# Patient Record
Sex: MALE | Race: BLACK OR AFRICAN AMERICAN | Employment: UNEMPLOYED | URBAN - METROPOLITAN AREA
[De-identification: names, ages, dates, MRNs, and addresses within clinical notes are randomized per-mention and may not be internally consistent; named-entity substitution may affect disease eponyms.]

---

## 2019-06-14 ENCOUNTER — ED HISTORICAL/CONVERTED ENCOUNTER (OUTPATIENT)
Dept: OTHER | Age: 59
End: 2019-06-14

## 2020-02-13 ENCOUNTER — ED HISTORICAL/CONVERTED ENCOUNTER (OUTPATIENT)
Dept: OTHER | Age: 60
End: 2020-02-13

## 2020-10-14 ENCOUNTER — HOSPITAL ENCOUNTER (OUTPATIENT)
Dept: PHYSICAL THERAPY | Age: 60
Discharge: HOME | End: 2020-10-14
Payer: COMMERCIAL

## 2020-10-14 PROCEDURE — 97163 PT EVAL HIGH COMPLEX 45 MIN: CPT

## 2020-10-14 NOTE — PROGRESS NOTES
[address] Suite [facility], [facility]  Ph: [phone]    Fax: [phone]    Initial Evaluation/Plan of Care/Statement of Necessity for Physical Therapy Services     Patient name: Hardeep Elizalde   : 1960  [x]  Patient  Verified Provider#: [id number]    Start of Care: 10/14/2020     Referral source: Francisco Arreguin MD Return visit to MD: 10/21/2020     Medical/Treatment Diagnosis: Low back pain [M54.5]  Neck pain [M54.2]    Payor: [address] [place] [address] Road / Plan: [address] / Product Type: Managed Care Medicaid /       Prior Hospitalization: see medical history     Comorbidities: Arthritis   Prior Level of Function: Independent but has a cane that uses occasionally and has a walker. Medications: Verified on Patient Summary List          Patient / Family readiness to learn indicated by: asking questions, trying to perform skills and interest  Persons(s) to be included in education: patient (P)  Barriers to Learning/Limitations: None  Patient Self Reported Health Status: fair  Rehabilitation Potential: fair  Previous Treatment/Compliance: None  PMHx/Surgical Hx: Recent left hand surgery for middle tigger finger. Work Hx: Not working at this time. Living Situation: Patient lives with family. Barriers to progress: N/A  Motivation: Good  Substance use: N/A  Cognition: A & O x 3  Onset Date: ~ 2 years ago   In time:1:55pm   Out time: 2:55pm Total Treatment Time (min): 60min   Visit #: 1. Visit count could not be calculated. Make sure you are using a visit which is associated with an episode. [address]  Patient reports constant neck and back pain that started about ~ 2 years ago, back is worse then neck but it is still high. 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  Patient stated he went to MD since now the pain doesn't go away at all. MD took a series of xray's. X-ray imaging report showed cervical disc disorder,lumbar DDD and lumbar faced joint arthopathy. Patient also reports raticular pain in the left arm and left leg that goes below his knee sometimes toward left foot. Pain type is throbbing with stiffness in the mornings and some pins and needles. Patient also c/o loss of balance and near falls, most recent was 3 months ago without significant injury he already had the back/neck pain. Activities that makes pain worse:\" everything I do\". laying down, sitting/standing and walking. Activities that makes pain better: none, medication used to help but not anymore. Functional limitation cutting the grass, he can't stand at the sink and do dishes. He feels limitation with everything. Location of Pain: Neck center of neck spine and in the back center of back and spread to left hip and sometimes feet. PAIN:  Area of pain: 8/10   Pain Level (0-10 scale)  At rest: 8/10 With activity: 9/10   Worst: 10/10   Least: 6/10  Things that worsen pain:  \" everything I do\". laying down, sitting/standing and walking.    Things that ease pain: None  Pain Level (0-10 scale) pre treatment: 8/10 Pain Level (0-10 scale) post treatment: 8/10  OBJECTIVE    Physical Findings   Ortho:   Posture:  Rounded shoulders, forward head, right scapula wigging   Palpation: Left UT C3-C7 paraspinals   Gross findings:  R handed, right shoulder depress  Spine: *normal values in ()  CERVICAL                                                ROM                                  Strength   Flexion  20 pain at the base of neck   3+/5   Extension 60 pain at the base of neck  3+/5   Protraction WFL    Retraction WFL center  pain        R L MMT    Lateral Flexion (45) 20  15  3+/5    Rotation (90) 25 40  3+/5     Additional comments: pain with each movement weak MMT on neck     Specific joints:   SHOULDER                                         AROM   PROM            MMT   R L R L R L Flexion (180) 90 90 160 150 3-/5 3-/5   Extension (60)         Abduction (180) 65 65 110 110 3-/5 3-/5   Adduction (0)         IR (70)         ER (90)         Horizontal Abduction (130)         Horizontal Adduction (45)         Additional comments: below 3/5 MMT throughout , pain with shrugging   Assessment overall limited patient not fully trying to perform motions stated he can't do it.  Strength: Fair (weak R hand due to trigger finger surgery)   Neurological:   Myotomes -Weakness   Dermatomes -  Reflexes/Sensations-intact to light touch   Special Tests:     Special Tests: Cervical Distraction: (+)  Cervical Compression: (+)    Spurling Test: (-)        Physical Findings   Ortho:   Posture:  mild lordisis, flat feet, narrow ZEYAD,   Gait and Functional Mobility:  Slow gait, narrow ZEYAD, ambulates without AD.    Palpation: TTP L>R PSIS, L3-L5 parasinals with PA , radiating with L4/L5 , L piriformis     Swelling: none  Spine: *normal values in ()  TRUNK ROM:     Flexion:                         [] N/A  []WNL  []Minimal  [x]Moderate  [] Major pain in left side 60cm from floor   Extension:                     [] N/A  []WNL  []Minimal  []Moderate  [x] Major worse pain    Right Side Glide:           [] N/A  []WNL  []Minimal  [x]Moderate  [] Major 50 cm from floor   Right Lateral Flexion:    [] N/A  []WNL  []Minimal  []Moderate  [] Major   Left Slide Glide:   [] N/A  []WNL  []Minimal  []Moderate  [] Major   Left Lateral Flexion:   [] N/A  []WNL  []Minimal  []Moderate  [x] Major 75 cm from floor   Rotation to the Right:  [] N/A  []WNL  []Minimal  [x]Moderate  [] Major   Rotation to the Left:   [] N/A  []WNL  []Minimal  []Moderate  [x] Major pain   Additional comments: pain on left back/glut area with majority of lumbar ROM    Specific joints:     Hip      AROM       PROM             MMT   R L R L R L   Flexion (120) 70 70 80 75  3+ 3+   Extension (15)     2+ 2+   Abduction (40)     3+  3+    Adduction (30) -2 -2   IR (40)         ER (40)         Additional comments: Patient guarding and slight self limiting with mobility unable to formally assess. PROM of B hip stiffness and decreased range in flexion,abd and IR/ER   HS flexibility done with SLR bilateral can't go bassed 45deg due to pain . Unable to do a 90/90 test.       Knee          AROM          PROM                       MMT   R L R L R L   Extension (0)      4- 3+   Flexion (145)  Prone      3-  3-    Additional comments:quad flexibility  (L) 110deg / R 120 deg   Self limiting with testing     Ankle                                 AROM                       PROM                             MMT   R L R L R L   Dorsiflexion (15)     3+ 3+   Plantarflexion (50)     3- 3-   Additional comments:    Mobility Assessment:        Neurological: Reflexes / Sensations: light touch intact to   Special Tests:      Trendelenberg/storke test : unable to assess due limitation with SLS   FABERS: (+) on L lower back (decreaed hip IR on R>L LE )   Slump: (+) on left LE    H.S. SLR: (-) no radicular pain but back pain and HS tightness. Piriformis Ext: (-)    Lumbar Distraction: (+) back pain    Functional assessment  Single leg stance: (L) LE 2 sec (R) 3 sec  Patient unsteady   Heel walk: can't perform (narrow base) unsteady needed cg to prevent LOB. Toe walk: 2 steps and could do more reports weakness  Squat:  performed a mini squat reports knee pain and back pain   Timed Up and GO (TUG) Test = 32 sec    Description: Measure of function with correlates to balance and fall risk      Interpretation:  ? 10 seconds = normal    ? 20seconds = good mobility,can go out alone, mobile without gait aid    ? 30 seconds = problems, cannot go outside alone, requires gait aid     A score of ? 14 seconds has been shown to indicate high risk of falls.        Age  Matched  Norms: Age in years Mean  in  seconds    60-69       7.9 +/-0.9    70-79 7.7 +/-2.3    80-89   No  device:  11.0 +/-2.2    With  device:  19.9 +/-6.4     No  device:  14.7 +/- 7.9    With  Device: 19.9 +/-2.5         1. Cody Tinsley. The Time Up &Go: A Test of Basic Functional Mobility for Frail Elderly Persons. Journal of [address] [facility]  2790;11(6): [id number].    [address] [facility] [address],[place]. Predicting the Probability for Falls in Community Dwelling Older Adults Using the Timed Up & Go Test.  Physical Therapy 2000; 80(9): [id number].    3. Khari MM,Joyce GL, Natalia James. Functional Performance in Community Living Older Adults. Journal of Geriatric Physical Therapy 2003;  26(3): 14--?22.  4. [address], Falls Screening and Referral Algorithm, TUG, [place],June 2005    Tendem = 6 sec with L LE only unable to do R LE   Modality rationale: to improve the patients ability to   ** Decline heat. Min Type Additional Details    [] Estim: []UnAtt   []Att       []TENS instruct                  []IFC  []Premod   []NMES                     []Other:  []w/US   []w/ice   []w/heat  Position:  Location:    []  Ice     []  Heat  []  Ice massage Position:  Location:    []  Traction: [] Cervical       []Lumbar                       [] Prone          []Supine                       []Intermittent   []Continuous Lbs:  []w/heat  []W/heat and Estim    []  Ultrasound: []Continuous   [] Pulsed at:                           []1MHz   []3MHz Location:  W/cm2:      [] Skin assessment post-treatment:   []intact []redness- no adverse reaction   []redness - adverse reaction:         ASSESSMENT/Changes in Function:   Patient was referred to skilled  physical therapy with c/o neck and back pain that started about 2 years ago, patient presents with decreased active and passive range of motion in back, neck and shoulder, weak core stabilization, weak LEs, and weak neck and bilateral UEs.  Imaging were done which report showed cervical disc disorder,lumbar DDD and lumbar faced joint arthopathy. Patient presented with decreased flexibility in B UT, bilateral HS/quad musculatures,decreased balance he was not able to perform a SLS and gait impairments which showed in his TUG scores. Patient will benefit from skilled PT services to improve flexibility of neck/shoulder and back area, improve neck /back ROM, improve strength of deep muscle of the neck, core stabilization and LE's; with the goal to overall improve his mobility and ADL's. Plan of care will include patient education, HEP, there-ex, strengthening and flexibility. Problem List/Impairments: pain affecting function, decrease ROM, decrease strength, impaired gait/ balance, decrease ADL/ functional abilitiies, decrease activity tolerance, decrease flexibility/ joint mobility and decrease transfer abilities  Patient Goal (s): To reduce neck/back pain so I can do things    Short Term Goals: To be accomplished in 2-3  weeks:  1. Patient will be independent with HEP to augment POC  2. Patient will report decrease pain by 1-2 points since initiation of therapy to improve his mobility. 3. Patient will improve neck/back/shoulder ROM by 5-10 deg to improve movement. 4. Patient will improve HS/Quad flexibility range to assist with  Improving his ambulation    Long Term Goals: To be accomplished in 6-8 weeks:  1. Patient will be able to sit/stand from >10min without pain to improve his quality of life. 2. Patient will be able to lay down without pain to improve his sleeping  3. Patient will  improve his SLS by 1-2 points and his TUG score by 5-10 sec to decrease fall risk. Frequency / Duration: Patient to be seen 2 times per week for 6-8 weeks.     Certification Period: 10/14/2020-12/30/2020    Treatment Plan may include any combination of the following: Therapeutic exercise, Therapeutic activities, Neuromuscular re-education, Gait/balance training, Manual therapy, Patient education and Functional mobility training    Patient/ Caregiver education and instruction: exercises    [x]  Plan of care has been reviewed with PTA. The Plan of Care is based on information from the initial evaluation. Mildred Cole, PT 10/14/2020     ________________________________________________________________________    I certify that the above Therapy Services are being furnished while the patient is under my care. I agree with the treatment plan and certify that this therapy is necessary.     [de-identified] Signature:____________________  Date:____________Time: _________

## 2020-12-01 ENCOUNTER — HOSPITAL ENCOUNTER (OUTPATIENT)
Dept: PHYSICAL THERAPY | Age: 60
Discharge: HOME | End: 2020-12-01
Payer: COMMERCIAL

## 2020-12-01 PROCEDURE — 97110 THERAPEUTIC EXERCISES: CPT

## 2020-12-01 NOTE — PROGRESS NOTES
PT DAILY TREATMENT NOTE - [facility]     Patient Name: Leonel Guillen  Date:2020  : 1960  [x]  Patient  Verified  [facility] [address] / Plan: [address] / Product Type: Managed Care Medicaid /    Treatment Area: Low back pain [M54.5]  Neck pain [M54.2]   Next MD APPT:   In time:0104pm  Out time:0150pm  Total Treatment Time (min): 45  Total Timed Codes (min): 45  1:1 Treatment Time ([facility] only): 45   Visit #:  Visit count could not be calculated. Make sure you are using a visit which is associated with an episode. SUBJECTIVE  Pt reports his car broke down and he was not able to get to PT, as well as waiting for ins authorization. Pt is returning after 6 weeks. Pt reports he is still having back and neck pain , x-rays reveal the discs are wearing out. Pt reports it has put a stop to what he was use to. Pt is having difficulty with balance due to pain running down the LLE, Pt also has N/T. Neck/back pain increases with cold weather, rain, walking, any type of lifting. Pt is not working now, pain has been present for 2 years. Better with sleep, heat, medication, and sleeping on a firm surface  Pain ranges from 7 - 10/10 in neck and lumbar area 8-10/10. Since pt was first evaluated the neck pain is the same and back pain is worse.        Pain Level (0-10 scale) pre treatment: 7/10 neck, back 9/10 Pain Level (0-10 scale) post treatment: 6/10 neck, 9/10 lumbar  Any medication changes, allergies to medications, adverse drug reactions, diagnosis change, or new procedure performed?: [x] No    [] Yes (see summary sheet for update)  Subjective functional status/changes:   [x] No changes reported     OBJECTIVE          With   [] TE   [] TA   [] neuro   [] other: Patient Education: [] Review HEP    [] Progressed/Changed HEP based on:   [] positioning   [] body mechanics   [] transfers   [] heat/ice application    [] other:      Other Objective/Functional Measures: Physical Findings   Ortho:   Posture:  slumped  Palpation: TTP B cervical   Gait/Functional Mobility:  Slow gay  Gross findings:  Pt is obese   Spine: *normal values in ()  CERVICAL                                                ROM                                  Strength   Flexion  WFL    Extension 5    Protraction     Retraction        R L R L   Lateral Flexion (45) 15 15     Rotation (90) 35 45     Additional comments: PROM in supine, cervical rotation at least 70 degrees B. Specific joints:   SHOULDER                                         AROM   PROM            MMT   R L R L R L   Flexion (180) 105 110 130 145 5 5   Extension (60)         Abduction (180)     5 5   Adduction (0)         IR (70)         ER (90)   80 85 4- 4   Horizontal Abduction (130)         Horizontal Adduction (45)         Additional comments: Pain in B shoulders prior to end ranges. Special Tests: Cervical distraction and compression increase neck pain, B shoulder impingement signs (+)    TRUNK ROM:     Flexion:                         [] N/A  []WNL  []Minimal  [x]Moderate  [] Major   Extension:                     [] N/A  []WNL  []Minimal  [x]Moderate  [] Major    Right Lateral Flexion:    [] N/A  []WNL  []Minimal  []Moderate  [x] Major    Left Lateral Flexion:  [] N/A  []WNL  []Minimal  []Moderate  [x] Major   Rotation to the Right:  [] N/A  []WNL  []Minimal  []Moderate  [x] Major   Rotation to the Left:   [] N/A  []WNL  []Minimal  []Moderate  [x] Major   Additional comments: FF 17\" finger tip to floor, repeated ext, pt deviates to the L increases, lateral flexion 24\" from floor ( measured at 3rd finger), Pt heavily resists passive rotation. C/O pain at the waist with lateral flexion and down to the lateral aspect of the L superior thigh with ext.    Specific joints: *normal values in ()          MUSCLE STRENGTH  KEY       R  L  0 - No Contraction  Hip    flex  5  5  1 - Trace            ext  3+          5  2 - Poor abd  4  4  3 - Fair             add     4 - Good   Knee flex  5  5  5 - Normal            ext  4  4      Ankle DF  5  5                PF  3  3  Hip flexion limited actively especially on the L , passive [facility] but painful in the lumbar spine on the L. Unable to walk on toes with full PF, able to walk on heels  Mobility Assessment:    Bed mobility, pt is independent with little difficulty ,  Needed B UE support to go from sit to stand very slowly. Neurological: Reflexes / Sensations: Intact to LT   Special Tests: SLR LBP upon initiation , tight HS B      45 min Therapeutic Exercise:  [x] See flow sheet :   Rationale: increase ROM, increase strength and improve balance to improve the patients ability to perform daily activities with improved ROM and decreased pain. ASSESSMENT/Changes in Function:   Pt was referred to PT back in October for chronic neck and back pain and was evaluated on 10/14/2020 and was unable to return to therapy due to car trouble and having no transportation. The pt' S/S are the same. Pt was able to generate more force in the LEs at re -eval but ROM and pain levels have not changed. Pt describes a sedentary life style and shoulder benefit from skilled PT to improve ROM/strength and decrease pain   Patient will continue to benefit from skilled PT services to modify and progress therapeutic interventions, address functional mobility deficits, address ROM deficits, address strength deficits, analyze and address soft tissue restrictions and assess and modify postural abnormalities to attain remaining goals. [x]  See Plan of Care  []  See progress note/recertification  []  See Discharge Summary       GOALS/Progress towards goals:     Short Term Goals: To be accomplished in 2-3  weeks:  1. Patient will be independent with HEP to augment POC [] Met  [] Not Met [] Partially Met  2. Patient will report decrease pain by 1-2 points since initiation of therapy to improve his mobility. [] Met  [] Not Met [] Partially Met  3. Patient will improve neck/back/shoulder ROM by 5-10 deg to improve movement. [] Met  [] Not Met [] Partially Met  4. Patient will improve HS/Quad flexibility range to assist with  Improving his ambulation [] Met  [] Not Met [] Partially Met     Long Term Goals: To be accomplished in 6-8 weeks:  1. Patient will be able to sit/stand from >10min without pain to improve his quality of life. [] Met  [] Not Met [] Partially Met  2. Patient will be able to lay down without pain to improve his sleeping [] Met  [] Not Met [] Partially Met  3. Patient will  improve his SLS by 1-2 points and his TUG score by 5-10 sec to decrease fall risk.  [] Met  [] Not Met [] Partially Met     PLAN  [x]  Upgrade activities as tolerated     []  Continue plan of care  [x]  Update interventions per flow sheet       []  Discharge due to:_  []  Other:_      Prema Clincamilla 12/1/2020

## 2020-12-07 ENCOUNTER — HOSPITAL ENCOUNTER (OUTPATIENT)
Dept: PHYSICAL THERAPY | Age: 60
Discharge: HOME | End: 2020-12-07
Payer: COMMERCIAL

## 2020-12-07 PROCEDURE — 97110 THERAPEUTIC EXERCISES: CPT

## 2020-12-07 NOTE — PROGRESS NOTES
PT DAILY TREATMENT NOTE - [facility]     Patient Name: Oscar Celis  Date:2020  : 1960  [x]  Patient  Verified  Payor: [address] [place] [address] Road / Plan: [address] / Product Type: Managed Care Medicaid /    Treatment Area: Low back pain [M54.5]  Neck pain [M54.2]   Next MD APPT:   In time:0150 pm  Out time:02:23 pm  Total Treatment Time (min): 33  Total Timed Codes (min): 33  1:1 Treatment Time ( [address] only): 33  Visit #: 3/16 Visit count could not be calculated. Make sure you are using a visit which is associated with an episode. SUBJECTIVE    Pain Level (0-10 scale) pre treatment: 6.5 /10 neck, back 8- 9/10   Any medication changes, allergies to medications, adverse drug reactions, diagnosis change, or new procedure performed?: [x] No    [] Yes (see summary sheet for update)  Subjective functional status/changes:   [x] No changes reported  Patient stated he is in a lot of pain due to the weather, his back in 8-9/10 and neck 6.5/10. Patient requested to leave a few min earlier due to ride issues. OBJECTIVE          With   [] TE   [] TA   [] neuro   [] other: Patient Education: [] Review HEP    [] Progressed/Changed HEP based on:   [] positioning   [] body mechanics   [] transfers   [] heat/ice application    [] other:         45 min Therapeutic Exercise:  [x] See flow sheet :   Rationale: increase ROM, increase strength and improve balance to improve the patients ability to perform daily activities with improved ROM and decreased pain. - Added PPT, TA, marching, hip abd with TB, hip add with ball, SLR, slant board, bike, hamstring stretches, anterior trunk lean, chin tuck and neck AROM    Other Objective/Functional Measures:   - reports mild discomfort with left hip flexor with bike. - Presents with increased LE weakness and tightness   - Decline heat.     ASSESSMENT/Changes in Function:   Patient tolerated session fairly well, patient required frequent rest brakes, and he reports increased fatigue. Patient presents with general LE weakness and decreased flexibility. Patient was educated about the importance to exercises and slowly progress. Pt describes a sedentary life style and he would benefit from skilled PT to improve ROM/strength and decrease pain. Patient will continue to benefit from skilled PT services to modify and progress therapeutic interventions, address functional mobility deficits, address ROM deficits, address strength deficits, analyze and address soft tissue restrictions and assess and modify postural abnormalities to attain remaining goals. [x]  See Plan of Care  []  See progress note/recertification  []  See Discharge Summary       GOALS/Progress towards goals:     Short Term Goals: To be accomplished in 2-3  weeks:  1. Patient will be independent with HEP to augment POC [] Met  [] Not Met [] Partially Met  2. Patient will report decrease pain by 1-2 points since initiation of therapy to improve his mobility. [] Met  [] Not Met [] Partially Met  3. Patient will improve neck/back/shoulder ROM by 5-10 deg to improve movement. [] Met  [] Not Met [] Partially Met  4. Patient will improve HS/Quad flexibility range to assist with  Improving his ambulation [] Met  [] Not Met [] Partially Met     Long Term Goals: To be accomplished in 6-8 weeks:  1. Patient will be able to sit/stand from >10min without pain to improve his quality of life. [] Met  [] Not Met [] Partially Met  2. Patient will be able to lay down without pain to improve his sleeping [] Met  [] Not Met [] Partially Met  3. Patient will  improve his SLS by 1-2 points and his TUG score by 5-10 sec to decrease fall risk.  [] Met  [] Not Met [] Partially Met     PLAN  [x]  Upgrade activities as tolerated     []  Continue plan of care  [x]  Update interventions per flow sheet       []  Discharge due to:_  []  Other:_      Sarthak Section, PT 12/7/2020

## 2020-12-14 ENCOUNTER — HOSPITAL ENCOUNTER (OUTPATIENT)
Dept: PHYSICAL THERAPY | Age: 60
Discharge: HOME | End: 2020-12-14
Payer: COMMERCIAL

## 2020-12-14 PROCEDURE — 97110 THERAPEUTIC EXERCISES: CPT

## 2020-12-14 NOTE — PROGRESS NOTES
PT DAILY TREATMENT NOTE - [facility]     Patient Name: Keke Bocanegra  Date:2020  : 1960  [x]  Patient  Verified  [facility] [address] / Plan: [address] / Product Type: Managed Care Medicaid /    Treatment Area: Low back pain [M54.5]  Neck pain [M54.2]   Next MD APPT:   In time:0150 pm  Out time:02:23 pm  Total Treatment Time (min): 30  Total Timed Codes (min): 30  1:1 Treatment Time ([facility] only): 30  Visit #: 3/16 Visit count could not be calculated. Make sure you are using a visit which is associated with an episode. SUBJECTIVE    Pain Level (0-10 scale) pre treatment: back 8- 9/10 Neck 4-5/10  Any medication changes, allergies to medications, adverse drug reactions, diagnosis change, or new procedure performed?: [x] No    [] Yes (see summary sheet for update)  Subjective functional status/changes:   [x] No changes reported  Patient stated he fell this morning at home, he lost his balance he was not using his cane since he lost it  at [employer] this morning. Patient reports he hurt his back and left hip. Stated he has an MD appointment right after PT to do a series of x-rays and he needs to leave early since he has a 2:30pm appointment. . Stated he will try to do as much as he can in therapy today. Patient also reports neck is about the same. OBJECTIVE          With   [] TE   [] TA   [] neuro   [] other: Patient Education: [] Review HEP    [] Progressed/Changed HEP based on:   [] positioning   [] body mechanics   [] transfers   [] heat/ice application    [] other:         45 min Therapeutic Exercise:  [x] See flow sheet :   Rationale: increase ROM, increase strength and improve balance to improve the patients ability to perform daily activities with improved ROM and decreased pain. Other Objective/Functional Measures:   Decreased ranged towards left side with neck rotation and SB  Added UT and levator cervical stretches.    - reports mild discomfort with bike and supine there-ex on left hip and lower back. ASSESSMENT/Changes in Function:   Patient stated he had a fall this morning and injured his left hip and lower back. Patient tolerated exercises fairly and they were modified accordingly based on pain level. Patient stated he came to therapy because his trying to avoid having any injections. Patient required frequent rest brakes but able to do all exercises. Reports some pain on left lower back/glut area. Patient was educated about the importance to exercises and stretches at home as tolerated and work on cervical ROM and UT/levator stretches. Also encouraged to use heat as needed to lower back and cervical unlese MD has different recommending post x-ray. Patient would benefit from skilled PT to improve ROM/strength and decrease pain. Patient will continue to benefit from skilled PT services to modify and progress therapeutic interventions, address functional mobility deficits, address ROM deficits, address strength deficits, analyze and address soft tissue restrictions and assess and modify postural abnormalities to attain remaining goals. [x]  See Plan of Care  []  See progress note/recertification  []  See Discharge Summary       GOALS/Progress towards goals:     Short Term Goals: To be accomplished in 2-3  weeks:  1. Patient will be independent with HEP to augment POC [] Met  [] Not Met [x] Partially Met  2. Patient will report decrease pain by 1-2 points since initiation of therapy to improve his mobility. [] Met  [] Not Met [] Partially Met  3. Patient will improve neck/back/shoulder ROM by 5-10 deg to improve movement. [] Met  [] Not Met [] Partially Met  4. Patient will improve HS/Quad flexibility range to assist with  Improving his ambulation [] Met  [] Not Met [] Partially Met     Long Term Goals: To be accomplished in 6-8 weeks:  1. Patient will be able to sit/stand from >10min without pain to improve his quality of life.  [] Met  [] Not Met [] Partially Met  2. Patient will be able to lay down without pain to improve his sleeping [] Met  [] Not Met [] Partially Met  3. Patient will  improve his SLS by 1-2 points and his TUG score by 5-10 sec to decrease fall risk.  [] Met  [] Not Met [] Partially Met     PLAN  [x]  Upgrade activities as tolerated     []  Continue plan of care  [x]  Update interventions per flow sheet       []  Discharge due to:_  []  Other:_      Hailey Lyons, PT 12/14/2020

## 2020-12-21 ENCOUNTER — APPOINTMENT (OUTPATIENT)
Dept: PHYSICAL THERAPY | Age: 60
End: 2020-12-21
Payer: COMMERCIAL

## 2020-12-28 ENCOUNTER — APPOINTMENT (OUTPATIENT)
Dept: PHYSICAL THERAPY | Age: 60
End: 2020-12-28
Payer: COMMERCIAL

## 2020-12-31 ENCOUNTER — HOSPITAL ENCOUNTER (OUTPATIENT)
Dept: PHYSICAL THERAPY | Age: 60
Discharge: HOME | End: 2020-12-31
Payer: COMMERCIAL

## 2020-12-31 PROCEDURE — 97110 THERAPEUTIC EXERCISES: CPT

## 2020-12-31 NOTE — PROGRESS NOTES
PT DAILY TREATMENT NOTE - [facility]     Patient Name: Ritchie Coronado  Date:2020  : 1960  [x]  Patient  Verified  Payor: [address] [place] [address] Road / Plan: [address] / Product Type: Managed Care Medicaid /    Treatment Area: Low back pain [M54.5]  Neck pain [M54.2]   Next MD APPT:   In time: 2:05 pm  Out time:02:54 pm  Total Treatment Time (min): 50  Total Timed Codes (min): 50  1:1 Treatment Time ([facility] only): 50  Visit #:  Visit count could not be calculated. Make sure you are using a visit which is associated with an episode. SUBJECTIVE    Pain Level (0-10 scale) pre treatment: back 7-8 /10 Neck 4/10  Any medication changes, allergies to medications, adverse drug reactions, diagnosis change, or new procedure performed?: [x] No    [] Yes (see summary sheet for update)  Subjective functional status/changes:   [x] No changes reported  Patient stated he is feeling so-so, still has back/neck pain. His neck is not as bad, he went to the doctor and they did imaging after his recent fall which everything was normal just the usual OA/DDD/DJD as per patient. Patient reports about 40-50% improvement since started of therapy. OBJECTIVE          With   [] TE   [] TA   [] neuro   [] other: Patient Education: [] Review HEP    [] Progressed/Changed HEP based on:   [] positioning   [] body mechanics   [] transfers   [] heat/ice application    [] other:         40 min Therapeutic Exercise:  [x] See flow sheet :   Rationale: increase ROM, increase strength and improve balance to improve the patients ability to perform daily activities with improved ROM and decreased pain. Other Objective/Functional Measures:  Reassessment performed   With neck rot/sb noted limited ranged on right>left side verbal cues to avoid shoulder compensation required.    With back reports more      Physical Findings   Ortho:   Posture:  slumped, rounded shoulders  Palpation: TTP B cervical   Gait/Functional Mobility: Slow gay  Gross findings:  Pt is obese   Spine: *normal values in ()  CERVICAL                                                ROM                                  Strength        Flexion  25     Extension 45     Protraction       Retraction            R L R L   Lateral Flexion (45) 30  22 limited        Rotation (90) 35 35! P       Additional comments: Patient guarded with activity, active range is less then passive. Specific joints:   SHOULDER                                         AROM                        PROM                       MMT    R L R L R L   Flexion (180) 150 145 160 160 5 5   Extension (60)               Abduction (180)  WFL with scaption  WFL with scaption      5 5   Adduction (0)               IR (70)          4  4   ER (90)     80 85 4 4   Horizontal Abduction (130)               Horizontal Adduction (45)               Additional comments: Pain in B shoulders prior to end ranges. Special Tests: Cervical distraction and compression increase neck pain, B shoulder impingement signs (+) Neer/HK     TRUNK ROM:      Flexion:                                  []? N/A  []? WNL  []? Minimal  [x]? Moderate  []? Major   Extension:                             []? N/A  []? WNL  []? Minimal  [x]? Moderate  []? Major    Right Lateral Flexion:           []? N/A  []? WNL  []? Minimal  []? Moderate  [x]? Major    Left Lateral Flexion:  []? N/A  []? WNL  []? Minimal  [x]? Moderate  []? Major P! Rotation to the Right:           []? N/A  []? WNL  []? Minimal  [x]? Moderate  []? Major   Rotation to the Left:              []? N/A  []? WNL  []? Minimal  [x]? Moderate  []? Major p! Additional comments: FF 28 \" finger tip to floor, repeated ext, pt deviates to the L increases, lateral flexion 24\" from floor ( measured at 3rd finger). Increased pain on R side of LS area.   Specific joints: *normal values in ()    flacuation MUSCLE STRENGTH  KEY                                                                             R                      L  0 - No Contraction                   Hip    flex                    - 4                    - 4  1 - Trace                                           ext                      3+                    5  2 - Poor                                            abd                     4                      4  3 - Fair                                              add                                   4 - Good                                  Knee flex                     5                      5  5 - Normal                                        ext                      4                      -4                                                  Ankle DF                    5                      5                                                            PF                     3                      3    Mobility Assessment:    Bed mobility, pt is independent with little difficulty                                         Pain post session 6/10 in back and 3/10 in neck     ASSESSMENT/Changes in Function:   Reassessment performed today patient reports overall 40-50% improvement since start of therapy, and stated his goal is to reduce back pain and loose some weight. Patient stated he is trying to move more and do his ADL's as tolerated. He continues to reports left side lumbar sacral pain and he has limited trunk ROM. We also noted that strength in his LE fluctuates and some time he preforms better then other. Patient is able to perform most exercises without difficulty but reports feeling general weakness and presents with decreased endurance and fatigue. Patient also guard with certain activities. In terms of neck/shoulder AROM/MMT, patient is progressing well and pain is subsiding noted improvement with mobility but he still has limited range in SB/ROt towards L>R side.  Patient was encouraged to continue to do his exercises and stretches at home. Patient was educated about the importance of his posture and sitting upright and doing his exercises and stretches at home as tolerated. Patient would benefit from skilled PT to improve ROM/strength and decrease pain. Patient will continue to benefit from skilled PT services to modify and progress therapeutic interventions, address functional mobility deficits, address ROM deficits, address strength deficits, analyze and address soft tissue restrictions and assess and modify postural abnormalities to attain remaining goals. [x]  See Plan of Care  []  See progress note/recertification  []  See Discharge Summary       GOALS/Progress towards goals:     Short Term Goals: To be accomplished in 2-3  weeks:  1. Patient will be independent with HEP to augment POC [x] Met  [] Not Met [] Partially Met  2. Patient will report decrease pain by 1-2 points since initiation of therapy to improve his mobility. [] Met  [x] Not Met [] Partially Met  3. Patient will improve neck/back/shoulder ROM by 5-10 deg to improve movement. [] Met  [] Not Met [x] Partially Met  4. Patient will improve HS/Quad flexibility range to assist with  Improving his ambulation [] Met  [] Not Met [x] Partially Met     Long Term Goals: To be accomplished in 6-8 weeks:  1. Patient will be able to sit/stand from >10min without pain to improve his quality of life. [] Met  [] Not Met [x] Partially Met  2. Patient will be able to lay down without pain to improve his sleeping [] Met  [] Not Met [x] Partially Met can only sleep on his side  3. Patient will  improve his SLS by 1-2 points and his TUG score by 5-10 sec to decrease fall risk.  [] Met  [] Not Met [] Partially Met     PLAN  [x]  Upgrade activities as tolerated     []  Continue plan of care  [x]  Update interventions per flow sheet       []  Discharge due to:_  []  Other:_      Camryn Candelario, PT 12/31/2020

## 2020-12-31 NOTE — PROGRESS NOTES
[address] [address] [address] [facility] [address] [facility]  Ph: [phone]    Fax: [phone]  Plan of Care  Name: Altaf Euceda   : 1960   MD: Aren Adams MD     Treatment Diagnosis: Low back pain [M54.5]  Neck pain [M54.2]  Start of Care: 10/14/2020 Visits from Start of Care: 5  Missed Visits: 2  Problem List/Impairments: pain affecting function, decrease ROM, decrease strength, impaired gait/ balance, decrease ADL/ functional abilitiies and decrease flexibility/ joint mobility       Summary of Care/Goals:  Patient has been reporting to op therapy for LBP and neck pain, today was his 5 session and we performed a re-assessment. Patient overall reports about 40-50% improvement in his symptoms since start of therapy, stated that he trys to do his ADL's as tolerated and wants to get stronger and lose weight. We noted some improvement in range of motion and strength, however patient fluctuates from session to session. He continues to reports left side lumbar sacral pain and he has limited trunk ROM. We also noted that strength in his LE fluctuates and some time he preforms better then other. Patient is able to perform most exercises without difficulty but reports feeling general weakness and presents with decreased endurance and fatigue. In terms of neck/shoulder AROM/MMT, patient is progressing well and pain is subsiding noted improvement with mobility but he still has limited range in neck SB/ROt towards L>R side. Patient was encouraged to continue to do his exercises and stretches at home. Our focus is also on his posture and being more aware of standing straighter. Patient is meeting some of his goals and will benefit from continuation of skilled PT services to modify and progress therapeutic interventions, address functional mobility deficits, address ROM deficits, address strength deficits, analyze and address soft tissue restrictions and assess and modify postural abnormalities to attain remaining goals. Other Objective/Functional Measures:  Reassessment performed   With neck rot/sb noted limited ranged on right>left side verbal cues to avoid shoulder compensation required. With back reports more       Physical Findings   Ortho:   Posture:  slumped, rounded shoulders  Palpation: TTP B cervical   Gait/Functional Mobility:  Slow gay  Gross findings:  Pt is obese   Spine: *normal values in ()  CERVICAL                                                ROM                                  Strength        Flexion  25     Extension 45     Protraction       Retraction            R L R L   Lateral Flexion (45) 30  22 limited        Rotation (90) 35 35! P       Additional comments: Patient guarded with activity, active range is less then passive.       Specific joints:   SHOULDER                                         AROM                        PROM                       MMT    R L R L R L   Flexion (180) 150 145 160 160 5 5   Extension (60)               Abduction (180)  WFL with scaption  WFL with scaption      5 5   Adduction (0)               IR (70)          4  4   ER (90)     80 85 4 4   Horizontal Abduction (130)               Horizontal Adduction (45)               Additional comments: Pain in B shoulders prior to end ranges.                                                                         Special Tests: Cervical distraction and compression increase neck pain, B shoulder impingement signs (+) Neer/HK      TRUNK ROM:      Flexion:                                  []? ? N/A  []? ?WNL  []? ? Minimal  [x]? ? Moderate  []? ? Major   Extension:                             []? ? N/A  []? ?WNL  []? ? Minimal  [x]? ? Moderate  []? ? Major    Right Lateral Flexion:           []? ? N/A  []? ?WNL  []? ? Minimal  []? ? Moderate  [x]? ? Major    Left Lateral Flexion:  []?? N/A  []? ?WNL  []? ? Minimal  [x]? ? Moderate  []? ? Major P! Rotation to the Right:           []? ? N/A  []? ?WNL  []??Minimal  [x]? ? Moderate  []? ? Major   Rotation to the Left:              []? ? N/A  []? ?WNL  []? ? Minimal  [x]? ? Moderate  []? ? Major p! Additional comments: FF 28 \" finger tip to floor, repeated ext, pt deviates to the L increases, lateral flexion 24\" from floor ( measured at 3rd finger). Increased pain on R side of LS area. Specific joints: *normal values in ()    flacuation                                                              MUSCLE STRENGTH  KEY                                                                             R                      L  0 - No Contraction                   Hip    flex                    - 4                    - 4  1 - Trace                                           ext                      3+                    5  2 - Poor                                            abd                     4                      4  3 - Fair                                              add                                   4 - Good                                  Knee flex                     5                      5  5 - Normal                                        ext                      4                      -4                                                  Ankle DF                    5                      5                                                            PF                     3                      3     Mobility Assessment:    Bed mobility, pt is independent with little difficulty                                            GOALS/Progress towards goals:     Short Term Goals: To be accomplished in 2-3  weeks:  1. Patient will be independent with HEP to augment POC [x]? Met  []? Not Met []? Partially Met  2. Patient will report decrease pain by 1-2 points since initiation of therapy to improve his mobility. []? Met  [x]? Not Met []? Partially Met  3. Patient will improve neck/back/shoulder ROM by 5-10 deg to improve movement. []? Met  []? Not Met [x]? Partially Met  4. Patient will improve HS/Quad flexibility range to assist with  Improving his ambulation []? Met  []? Not Met [x]? Partially Met     Long Term Goals: To be accomplished in 6-8 weeks:  1. Patient will be able to sit/stand from >10min without pain to improve his quality of life. []? Met  []? Not Met [x]? Partially Met  2. Patient will be able to lay down without pain to improve his sleeping []? Met  []? Not Met [x]? Partially Met can only sleep on his side  3. Patient will  improve his SLS by 1-2 points and his TUG score by 5-10 sec to decrease fall risk. []? Met  []? Not Met []? Partially Met       Recommendations: Continuation of therapy and extension of POC. Start of care 12/30/2020  Frequency / Duration: Patient to be seen 2 times per week for  12 treatments. Certification Period: 12/31/2020-3/30/2020  Treatment Plan may include any combination of the following: Therapeutic exercise, Therapeutic activities, Neuromuscular re-education, Physical agent/modality, Manual therapy and Patient education    Patient/ Caregiver education and instruction: exercises  [x]  Plan of care has been reviewed with PTA. Mildred Cole, PT 12/31/2020     Retain this original for your records. If you are unable to process this request in 24 hours, please contact our office.   ________________________________________________________________________  NOTE TO PHYSICIAN:  Please complete the following and fax to: [address] [facility]:  Fax: [phone]   ____ I certify that the above Therapy Services are being furnished while the patient is under my care. I agree with the treatment plan and certify that this therapy is necessary.    ____ I have read the above report and request that my patient continue therapy with the following changes/special instructions:  ____ I have read the above report and request that my patient be discharged from therapy    Physician's Signature:_________________ Date:___________Time:__________

## 2021-01-07 ENCOUNTER — HOSPITAL ENCOUNTER (OUTPATIENT)
Dept: PHYSICAL THERAPY | Age: 61
Discharge: HOME | End: 2021-01-07
Payer: COMMERCIAL

## 2021-01-07 PROCEDURE — 97140 MANUAL THERAPY 1/> REGIONS: CPT

## 2021-01-07 PROCEDURE — 97110 THERAPEUTIC EXERCISES: CPT

## 2021-01-07 NOTE — PROGRESS NOTES
PT DAILY TREATMENT NOTE - [facility]     Patient Name: Nancy Benoit  Date:2021  : 1960  [x]  Patient  Verified  Payor: Kain Ramírez / Plan: Darlene Polk / Product Type: Managed Care Medicaid /    Treatment Area: Low back pain [M54.5]  Neck pain [M54.2]   Next MD APPT: 2021  In time: 2:28 pm  Out time:03:15 pm  Total Treatment Time (min): 45  Total Timed Codes (min): 45  1:1 Treatment Time ( [address] only): 45  Visit #:  Visit count could not be calculated. Make sure you are using a visit which is associated with an episode. SUBJECTIVE    Pain Level (0-10 scale) pre treatment: back 8 /10   Neck 4/10  Any medication changes, allergies to medications, adverse drug reactions, diagnosis change, or new procedure performed?: [x] No    [] Yes (see summary sheet for update)  Subjective functional status/changes:   [x] No changes reported  Patient stated his back is bothering him today more then his neck, he has about 8/10 back pain on his left that goes to the left hip and down the leg. OBJECTIVE          With   [] TE   [] TA   [] neuro   [] other: Patient Education: [] Review HEP    [] Progressed/Changed HEP based on:   [] positioning   [] body mechanics   [] transfers   [] heat/ice application    [] other:         35 min Therapeutic Exercise:  [x] See flow sheet :   Rationale: increase ROM, increase strength and improve balance to improve the patients ability to perform daily activities with improved ROM and decreased pain. 10 min Manual Therapy: Left lower back    Rationale: decrease pain, increase tissue extensibility and decrease trigger points to improve the patients ability to reduce pain with mobility.      Other Objective/Functional Measures:  Progress with POC               Pain post session 7/10 in back and 3/10 in neck     ASSESSMENT/Changes in Function:   Patient reports higher pain level today specially on his back, stated his it been radiating to his hip and foot on the left side. Patient stated he is doing his exercises but has very little relief and has increased stiffness. Patient was able to perform exercises but reports still same pain level. We performed some manual mobilization to left lower back as tolerated due to tenderness to palpation. Patient reports slight relief. Patient was educated about the importance of his posture and sitting upright and doing his exercises and stretches at home as tolerated. Patient would benefit from skilled PT to improve ROM/strength and decrease pain. Patient will continue to benefit from skilled PT services to modify and progress therapeutic interventions, address functional mobility deficits, address ROM deficits, address strength deficits, analyze and address soft tissue restrictions and assess and modify postural abnormalities to attain remaining goals. [x]  See Plan of Care  []  See progress note/recertification  []  See Discharge Summary       GOALS/Progress towards goals:     Short Term Goals: To be accomplished in 2-3  weeks:  1. Patient will be independent with HEP to augment POC [x] Met  [] Not Met [] Partially Met  2. Patient will report decrease pain by 1-2 points since initiation of therapy to improve his mobility. [] Met  [x] Not Met [] Partially Met  3. Patient will improve neck/back/shoulder ROM by 5-10 deg to improve movement. [] Met  [] Not Met [x] Partially Met  4. Patient will improve HS/Quad flexibility range to assist with  Improving his ambulation [] Met  [] Not Met [x] Partially Met     Long Term Goals: To be accomplished in 6-8 weeks:  1. Patient will be able to sit/stand from >10min without pain to improve his quality of life. [] Met  [] Not Met [x] Partially Met  2. Patient will be able to lay down without pain to improve his sleeping [] Met  [] Not Met [x] Partially Met can only sleep on his side  3.  Patient will  improve his SLS by 1-2 points and his TUG score by 5-10 sec to decrease fall risk. [] Met  [] Not Met [] Partially Met     PLAN  [x]  Upgrade activities as tolerated     []  Continue plan of care  [x]  Update interventions per flow sheet       []  Discharge due to:_  []  Other:_      Priscilla Guerrero, PT 1/7/2021

## 2021-01-15 ENCOUNTER — APPOINTMENT (OUTPATIENT)
Dept: PHYSICAL THERAPY | Age: 61
End: 2021-01-15
Payer: COMMERCIAL

## 2021-01-18 ENCOUNTER — HOSPITAL ENCOUNTER (EMERGENCY)
Age: 61
Discharge: HOME | End: 2021-01-18
Payer: COMMERCIAL

## 2021-01-18 ENCOUNTER — APPOINTMENT (OUTPATIENT)
Dept: GENERAL RADIOLOGY | Age: 61
End: 2021-01-18
Attending: PHYSICIAN ASSISTANT
Payer: COMMERCIAL

## 2021-01-18 VITALS
OXYGEN SATURATION: 94 % | RESPIRATION RATE: 19 BRPM | TEMPERATURE: 98.2 F | HEART RATE: 82 BPM | DIASTOLIC BLOOD PRESSURE: 67 MMHG | HEIGHT: 70 IN | WEIGHT: 245 LBS | SYSTOLIC BLOOD PRESSURE: 124 MMHG | BODY MASS INDEX: 35.07 KG/M2

## 2021-01-18 PROCEDURE — 75810000275 HC EMERGENCY DEPT VISIT NO LEVEL OF CARE

## 2021-02-19 ENCOUNTER — APPOINTMENT (OUTPATIENT)
Dept: PHYSICAL THERAPY | Age: 61
End: 2021-02-19
Payer: COMMERCIAL

## 2021-02-23 ENCOUNTER — HOSPITAL ENCOUNTER (OUTPATIENT)
Dept: PHYSICAL THERAPY | Age: 61
Discharge: HOME | End: 2021-02-23
Payer: COMMERCIAL

## 2021-02-23 PROCEDURE — 97110 THERAPEUTIC EXERCISES: CPT

## 2021-02-23 NOTE — PROGRESS NOTES
PT DAILY TREATMENT NOTE - [facility]     Patient Name: Radha Bazan  Date:2021  : 1960  [x]  Patient  Verified  Payor: [address] [place] [address] Road / Plan: [address] / Product Type: Managed Care Medicaid /    Treatment Area: Low back pain [M54.5]  Neck pain [M54.2]   Next MD APPT: 2021  In time: 10:50 am  Out time:11:30 am  Total Treatment Time (min): 40  Total Timed Codes (min): 40  1:1 Treatment Time ([facility] only): 40  Visit #:  ( new ins auth ) Visit count could not be calculated. Make sure you are using a visit which is associated with an episode. SUBJECTIVE    Pain Level (0-10 scale) pre treatment: back 8 /10 both sides   Neck 5/10  Any medication changes, allergies to medications, adverse drug reactions, diagnosis change, or new procedure performed?: [x] No    [] Yes (see summary sheet for update)  Subjective functional status/changes:   [x] No changes reported  Patient stated his back is bothering him today more then his neck . He states his back especially the L side hurts all the time, with no relief. OBJECTIVE          With   [] TE   [] TA   [] neuro   [] other: Patient Education: [] Review HEP    [] Progressed/Changed HEP based on:   [] positioning   [] body mechanics   [] transfers   [] heat/ice application    [] other:         40 min Therapeutic Exercise:  [x] See flow sheet :   Rationale: increase ROM, increase strength and improve balance to improve the patients ability to perform daily activities with improved ROM and decreased pain. min Manual Therapy: Left lower back    Rationale: decrease pain, increase tissue extensibility and decrease trigger points to improve the patients ability to reduce pain with mobility.      Other Objective/Functional Measures:  Progress with POC added open book               Pain post session 8/10 in back on L side only and 0/10 in neck     ASSESSMENT/Changes in Function:   Patient does not complain of any increase of pain or discomfort with exercises and is able to perform all exercises. He reports no pain to neck following treatment session and only L side pain to his lumbar spine. He reports no significant change to pain on L side of lumbar spine. Patient would benefit from skilled PT to improve ROM/strength and decrease pain. Patient will continue to benefit from skilled PT services to modify and progress therapeutic interventions, address functional mobility deficits, address ROM deficits, address strength deficits, analyze and address soft tissue restrictions and assess and modify postural abnormalities to attain remaining goals. [x]  See Plan of Care  []  See progress note/recertification  []  See Discharge Summary       GOALS/Progress towards goals:     Short Term Goals: To be accomplished in 2-3  weeks:  1. Patient will be independent with HEP to augment POC [x] Met  [] Not Met [] Partially Met  2. Patient will report decrease pain by 1-2 points since initiation of therapy to improve his mobility. [] Met  [x] Not Met [] Partially Met  3. Patient will improve neck/back/shoulder ROM by 5-10 deg to improve movement. [] Met  [] Not Met [x] Partially Met  4. Patient will improve HS/Quad flexibility range to assist with  Improving his ambulation [] Met  [] Not Met [x] Partially Met     Long Term Goals: To be accomplished in 6-8 weeks:  1. Patient will be able to sit/stand from >10min without pain to improve his quality of life. [] Met  [] Not Met [x] Partially Met  2. Patient will be able to lay down without pain to improve his sleeping [] Met  [] Not Met [x] Partially Met can only sleep on his side  3. Patient will  improve his SLS by 1-2 points and his TUG score by 5-10 sec to decrease fall risk.  [] Met  [] Not Met [] Partially Met     PLAN  [x]  Upgrade activities as tolerated     []  Continue plan of care  [x]  Update interventions per flow sheet       []  Discharge due to:_  []  Other:_      Caleb Sanchez PTA 2/23/2021

## 2021-02-25 ENCOUNTER — HOSPITAL ENCOUNTER (OUTPATIENT)
Dept: PHYSICAL THERAPY | Age: 61
Discharge: HOME | End: 2021-02-25
Payer: COMMERCIAL

## 2021-02-25 PROCEDURE — 97110 THERAPEUTIC EXERCISES: CPT

## 2021-02-25 NOTE — PROGRESS NOTES
PT DAILY TREATMENT NOTE - [facility]     Patient Name: Sapna Todd  Date:2021  : 1960  [x]  Patient  Verified  Payor: [address] [place] [address] Road / Plan: [address] / Product Type: Managed Care Medicaid /    Treatment Area: Low back pain [M54.5]  Neck pain [M54.2]   Next MD APPT: 2021  In time: 10:20 am  Out time: 1105am  Total Treatment Time (min): 45  Total Timed Codes (min): 45  1:1 Treatment Time ( [address] only): 45  Visit #:  ( new ins auth ) Visit count could not be calculated. Make sure you are using a visit which is associated with an episode. SUBJECTIVE    Pain Level (0-10 scale) pre treatment: back down into L side 10   Neck 10  Any medication changes, allergies to medications, adverse drug reactions, diagnosis change, or new procedure performed?: [x] No    [] Yes (see summary sheet for update)  Subjective functional status/changes:   [x] No changes reported  \"I had to take pain medicine yesterday because of the weather, I took it later about 2-3 in the afternoon. I also used a hot pack, twice for 20 minutes on my low back. It felt like it loosened it up a little. My doctor called and prescribed a new medication for my nerve pain. I don't know the name, I have to go pick it up. I didn't like the medication he prescribed because it was laced with opium and I didn't want to take it. \"     OBJECTIVE          With   [x] TE   [] TA   [] neuro   [] other: Patient Education: [] Review HEP    [] Progressed/Changed HEP based on:   [] positioning   [] body mechanics   [] transfers   [] heat/ice application    [] other:         45 min Therapeutic Exercise:  [x] See flow sheet :   Rationale: increase ROM, increase strength and improve balance to improve the patients ability to perform daily activities with improved ROM and decreased pain.       min Manual Therapy: Left lower back    Rationale: decrease pain, increase tissue extensibility and decrease trigger points to improve the patients ability to reduce pain with mobility. Other Objective/Functional Measures:  Progress with POC, see flow sheet                 Pain post session 6/10 in back on L side only and 3/10 in neck     ASSESSMENT/Changes in Function:    Patient denies increase in low back/cervical pain during PRE. Progressed hamstring curl and clamshell resistance with theraband application, and increased repetitions with good tolerance. Pt requires minimal cues for proper form throughout therex program to avoid injury and overcompensation. Patient reports increased stretch on L side with LTR and open books. Patient would benefit from skilled PT to improve ROM/strength and decrease pain. Patient will continue to benefit from skilled PT services to modify and progress therapeutic interventions, address functional mobility deficits, address ROM deficits, address strength deficits, analyze and address soft tissue restrictions and assess and modify postural abnormalities to attain remaining goals. [x]  See Plan of Care  []  See progress note/recertification  []  See Discharge Summary       GOALS/Progress towards goals:     Short Term Goals: To be accomplished in 2-3  weeks:  1. Patient will be independent with HEP to augment POC [x] Met  [] Not Met [] Partially Met  2. Patient will report decrease pain by 1-2 points since initiation of therapy to improve his mobility. [] Met  [x] Not Met [] Partially Met  3. Patient will improve neck/back/shoulder ROM by 5-10 deg to improve movement. [] Met  [] Not Met [x] Partially Met  4. Patient will improve HS/Quad flexibility range to assist with  Improving his ambulation [] Met  [] Not Met [x] Partially Met     Long Term Goals: To be accomplished in 6-8 weeks:  1. Patient will be able to sit/stand from >10min without pain to improve his quality of life. [] Met  [] Not Met [x] Partially Met  2.  Patient will be able to lay down without pain to improve his sleeping [] Met  [] Not Met [x] Partially Met can only sleep on his side  3. Patient will  improve his SLS by 1-2 points and his TUG score by 5-10 sec to decrease fall risk.  [] Met  [] Not Met [] Partially Met     PLAN  [x]  Upgrade activities as tolerated     []  Continue plan of care  [x]  Update interventions per flow sheet       []  Discharge due to:_  []  Other:_      Alina Walter, PT, DPT 2/25/2021

## 2021-03-29 ENCOUNTER — OFFICE VISIT (OUTPATIENT)
Dept: INTERNAL MEDICINE CLINIC | Age: 61
End: 2021-03-29
Payer: COMMERCIAL

## 2021-03-29 VITALS
HEART RATE: 85 BPM | DIASTOLIC BLOOD PRESSURE: 72 MMHG | RESPIRATION RATE: 18 BRPM | WEIGHT: 250.6 LBS | OXYGEN SATURATION: 96 % | SYSTOLIC BLOOD PRESSURE: 122 MMHG | BODY MASS INDEX: 35.08 KG/M2 | HEIGHT: 71 IN | TEMPERATURE: 98.1 F

## 2021-03-29 DIAGNOSIS — G89.29 CHRONIC MIDLINE BACK PAIN, UNSPECIFIED BACK LOCATION: ICD-10-CM

## 2021-03-29 DIAGNOSIS — E66.3 OVERWEIGHT: ICD-10-CM

## 2021-03-29 DIAGNOSIS — M54.16 LUMBAR RADICULITIS: ICD-10-CM

## 2021-03-29 DIAGNOSIS — M54.9 CHRONIC MIDLINE BACK PAIN, UNSPECIFIED BACK LOCATION: ICD-10-CM

## 2021-03-29 DIAGNOSIS — Z12.5 PROSTATE CANCER SCREENING: ICD-10-CM

## 2021-03-29 DIAGNOSIS — E78.00 PURE HYPERCHOLESTEROLEMIA: Primary | ICD-10-CM

## 2021-03-29 PROCEDURE — 99214 OFFICE O/P EST MOD 30 MIN: CPT | Performed by: INTERNAL MEDICINE

## 2021-03-29 RX ORDER — BACLOFEN 10 MG/1
10 TABLET ORAL
COMMUNITY
Start: 2021-01-12 | End: 2022-10-12 | Stop reason: DRUGHIGH

## 2021-03-29 NOTE — PROGRESS NOTES
Michelle Contreras is a 62 y.o. male and presents with Establish Care, Back Pain, and Neck Pain  Patient presents wishing to establish very pleasant 31-year-old slightly overweight followed by Dr. Michael Quinn in January medical but wishes to relocate here currently has been recently on cephalexin 500 mg-meloxicam 7.5 mg nightly tramadol 50 mg every 8 hours baclofen 10 mg twice a day by Dr. Irais Gavin his orthopedist rehab specialist for chronic back pain has lumbar radiculitis with pain down the left leg at times he says it is troubling even with the medications prescribed he is to follow-up with Dr. Irais Gavin- in the next month however he says he seems like he cannot wait until then for further evaluation and management he is being considered for MRI of the lumbar spine by Dr. Zhao Smith but since the patient is here and not doing as well I will proceed with he was agreeable for the office phlebotomist to draw blood flow studies below he is also on atorvastatin for hypercholesterolemia 20 mg a day he has had 2 inguinal hernia operations an operation on his hand he has degenerative arthritis he smokes cigarettes we discussed abstinence and discontinuing smoking he already had screening colonoscopy is not due for one at this time blood pressure resting 120/70 right arm is very pleasant and engaging we discussed coronavirus pandemic evaluation treatment and vaccine           Review of Systems  Constitutional: negative for fevers, chills, anorexia, weight loss and fatigue,no insomnia  Eyes:   negative for visual disturbance and irritation, eye discharge, eye pain. no eye redness. ENT:   negative for tinnitus, sore throat, nasal congestion, ear pain, hoarseness, hearing loss.,no snoring.   Respiratory:  negative for cough, hemoptysis, shortness of breath, wheezing,  CV:   negative for chest pain, palpitations, lower extremity edema, shortness of breath while sitting, walking or at night  GI:   negative for nausea, vomiting, diarrhea, abdominal pain,melena,constipation. Endo:               negative for polyuria, polydipsia, polyphagia, cold or heat intolerance,hair loss. Genitourinary: negative for frequency, dysuria and hematuria,urethral discharge,nocturia.straining while urination,urinary incontinence. Integument:  negative for rash and pruritus  Hematologic:  negative for easy bruising and gum/nose bleeding, enlarged nodes  Musculoskel: negative for myalgias, arthralgias, noted back pain, muscle weakness, joint pain, h/o fall,cramps,calf pain. DJD  Neurological:  negative for headaches, dizziness, vertigo, memory problems, gait and seizures loss of consciousness,no ataxia. Lumbar radiculitis is notable  Behavl/Psych: negative for feelings of anxiety, depression, mood changes ,sadness    Past Medical History:   Diagnosis Date    Gout      Past Surgical History:   Procedure Laterality Date    HX HERNIA REPAIR      twice    HX ORTHOPAEDIC      hand     Social History     Socioeconomic History    Marital status:      Spouse name: Not on file    Number of children: Not on file    Years of education: Not on file    Highest education level: Not on file   Tobacco Use    Smoking status: Current Some Day Smoker     Packs/day: 0.25     Years: 30.00     Pack years: 7.50     Types: Cigarettes    Smokeless tobacco: Never Used    Tobacco comment: pt states 3 times a week   Substance and Sexual Activity    Alcohol use: Yes     Comment: occasionally    Drug use: Never     Family History   Problem Relation Age of Onset    Cancer Mother     Diabetes Sister     Heart Disease Brother      Current Outpatient Medications   Medication Sig Dispense Refill    baclofen (LIORESAL) 10 mg tablet Take 10 mg by mouth two (2) times daily as needed.        No Known Allergies    Objective:  Visit Vitals  /72 (BP 1 Location: Right arm, BP Patient Position: Sitting, BP Cuff Size: Large adult)   Pulse 85   Temp 98.1 °F (36.7 °C) (Oral)   Resp 18   Ht 5' 11\" (1.803 m)   Wt 250 lb 9.6 oz (113.7 kg)   SpO2 96%   BMI 34.95 kg/m²       Physical Exam:   Constitutional: General Appearance: healthy-appearing and obese. Level of Distress: NAD. Ambulation: ambulating normally. Psychiatric: Mental Status: normal mood and affect and active and alert. Orientation: to time, place, and person. no agitation. ,normal eye contact. normal insight  Head: Head: normocephalic and atraumatic. Eyes: Pupils: PERRLA. Sclerae: non-icteric. ENMT: No lesions on external ear, no hearing loss. No lesions on external nose, sinus tenderness, or nasal discharge. Lips, Teeth, and no mouth or lip ulcers   Neck: Neck: supple, trachea midline, and no masses. Lymph Nodes: no cervical LAD. Thyroid: no enlargement or nodules and non-tender. Lungs: Respiratory effort: no dyspnea. Auscultation: no wheezing, rales/crackles, or rhonchi and breath sounds normal and good air movement. Cardiovascular: Apical Impulse: not displaced. Heart Auscultation: normal S1 and S2; no murmurs, rubs, or gallops; and RRR. Neck vessels: no carotid bruits. Pulses including femoral / pedal: normal throughout. Abdomen: Bowel Sounds: normal. Inspection and Palpation: no tenderness, guarding, or masses and soft and non-distended. Liver: non-tender   Musculoskeletal[de-identified] Extremities: no edema or varicosities. Calf tenderness. DJD  Neurologic: Gait and Station: normal gait and station. Motor Strength normal right and left. Sensory and cerebellar intact. Skin: Inspection and palpation: no rash, lesions, or ulcer. No results found for this or any previous visit. Assessment/Plan:    ICD-10-CM ICD-9-CM    1. Pure hypercholesterolemia  E78.00 272.0 CBC WITH AUTOMATED DIFF      METABOLIC PANEL, COMPREHENSIVE      TSH 3RD GENERATION      LIPID PANEL   2. Prostate cancer screening  Z12.5 V76.44 PSA W/ REFLX FREE PSA   3. Lumbar radiculitis  M54.16 724.4    4.  Chronic midline back pain, unspecified back location  M54.9 724.5 G89.29 338.29    5. Overweight  E66.3 278.02      Orders Placed This Encounter    CBC WITH AUTOMATED DIFF    METABOLIC PANEL, COMPREHENSIVE    TSH 3RD GENERATION    LIPID PANEL    PSA W/ REFLX FREE PSA    baclofen (LIORESAL) 10 mg tablet     Sig: Take 10 mg by mouth two (2) times daily as needed. routine labs ordered, call if any problems    There are no Patient Instructions on file for this visit. Follow-up and Dispositions    · Return in 3 months (on 6/29/2021), or if symptoms worsen or fail to improve.

## 2021-03-29 NOTE — PROGRESS NOTES
1. Have you been to the ER, urgent care clinic since your last visit? Hospitalized since your last visit? No    2. Have you seen or consulted any other health care providers outside of the [address] [place] [address] since your last visit? Include any pap smears or colon screening.  No     Chief Complaint   Patient presents with    Establish Care    Back Pain    Neck Pain     Visit Vitals  /72 (BP 1 Location: Right arm, BP Patient Position: Sitting, BP Cuff Size: Large adult)   Pulse 85   Temp 98.1 °F (36.7 °C) (Oral)   Resp 18   Ht 5' 11\" (1.803 m)   Wt 250 lb 9.6 oz (113.7 kg)   SpO2 96%   BMI 34.95 kg/m²

## 2021-03-30 LAB
ALBUMIN SERPL-MCNC: 3.9 G/DL (ref 3.8–4.9)
ALBUMIN/GLOB SERPL: 1.4 {RATIO} (ref 1.2–2.2)
ALP SERPL-CCNC: 90 IU/L (ref 39–117)
ALT SERPL-CCNC: 20 IU/L (ref 0–44)
AST SERPL-CCNC: 19 IU/L (ref 0–40)
BASOPHILS # BLD AUTO: 0 X10E3/UL (ref 0–0.2)
BASOPHILS NFR BLD AUTO: 1 %
BILIRUB SERPL-MCNC: 0.4 MG/DL (ref 0–1.2)
BUN SERPL-MCNC: 12 MG/DL (ref 6–24)
BUN/CREAT SERPL: 12 (ref 9–20)
CALCIUM SERPL-MCNC: 8.5 MG/DL (ref 8.7–10.2)
CHLORIDE SERPL-SCNC: 107 MMOL/L (ref 96–106)
CHOLEST SERPL-MCNC: 225 MG/DL (ref 100–199)
CO2 SERPL-SCNC: 21 MMOL/L (ref 20–29)
CREAT SERPL-MCNC: 0.99 MG/DL (ref 0.76–1.27)
EOSINOPHIL # BLD AUTO: 0.1 X10E3/UL (ref 0–0.4)
EOSINOPHIL NFR BLD AUTO: 3 %
ERYTHROCYTE [DISTWIDTH] IN BLOOD BY AUTOMATED COUNT: 13.1 % (ref 11.6–15.4)
GLOBULIN SER CALC-MCNC: 2.7 G/DL (ref 1.5–4.5)
GLUCOSE SERPL-MCNC: 103 MG/DL (ref 65–99)
HCT VFR BLD AUTO: 40.9 % (ref 37.5–51)
HDLC SERPL-MCNC: 50 MG/DL
HGB BLD-MCNC: 13.7 G/DL (ref 13–17.7)
IMM GRANULOCYTES # BLD AUTO: 0 X10E3/UL (ref 0–0.1)
IMM GRANULOCYTES NFR BLD AUTO: 0 %
LDLC SERPL CALC-MCNC: 127 MG/DL (ref 0–99)
LYMPHOCYTES # BLD AUTO: 2.3 X10E3/UL (ref 0.7–3.1)
LYMPHOCYTES NFR BLD AUTO: 43 %
MCH RBC QN AUTO: 30 PG (ref 26.6–33)
MCHC RBC AUTO-ENTMCNC: 33.5 G/DL (ref 31.5–35.7)
MCV RBC AUTO: 90 FL (ref 79–97)
MONOCYTES # BLD AUTO: 0.5 X10E3/UL (ref 0.1–0.9)
MONOCYTES NFR BLD AUTO: 9 %
NEUTROPHILS # BLD AUTO: 2.3 X10E3/UL (ref 1.4–7)
NEUTROPHILS NFR BLD AUTO: 44 %
PLATELET # BLD AUTO: 199 X10E3/UL (ref 150–450)
POTASSIUM SERPL-SCNC: 4.3 MMOL/L (ref 3.5–5.2)
PROT SERPL-MCNC: 6.6 G/DL (ref 6–8.5)
PSA SERPL-MCNC: 0.6 NG/ML (ref 0–4)
RBC # BLD AUTO: 4.56 X10E6/UL (ref 4.14–5.8)
REFLEX CRITERIA: NORMAL
SODIUM SERPL-SCNC: 144 MMOL/L (ref 134–144)
TRIGL SERPL-MCNC: 272 MG/DL (ref 0–149)
TSH SERPL DL<=0.005 MIU/L-ACNC: 0.93 UIU/ML (ref 0.45–4.5)
VLDLC SERPL CALC-MCNC: 48 MG/DL (ref 5–40)
WBC # BLD AUTO: 5.3 X10E3/UL (ref 3.4–10.8)

## 2021-03-31 NOTE — PROGRESS NOTES
Notify the patient all labs normal cholesterol borderline but will discuss on return PSA for prostate cancer normal

## 2021-04-07 ENCOUNTER — HOSPITAL ENCOUNTER (OUTPATIENT)
Dept: MRI IMAGING | Age: 61
Discharge: HOME | End: 2021-04-07
Attending: INTERNAL MEDICINE
Payer: COMMERCIAL

## 2021-04-07 DIAGNOSIS — G89.29 CHRONIC MIDLINE BACK PAIN, UNSPECIFIED BACK LOCATION: ICD-10-CM

## 2021-04-07 DIAGNOSIS — M54.9 CHRONIC MIDLINE BACK PAIN, UNSPECIFIED BACK LOCATION: ICD-10-CM

## 2021-04-07 DIAGNOSIS — M54.16 LUMBAR RADICULITIS: ICD-10-CM

## 2021-04-07 PROCEDURE — 72148 MRI LUMBAR SPINE W/O DYE: CPT

## 2021-04-10 NOTE — PROGRESS NOTES
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 He needs to see [facility] orthopedics. He currently sees Dr. Madison Cox  He has an appointment in the next few weeks see if he can see him a little sooner if not make an appointment with Dr. Caren Kirkland partner.   Please send a copy of the MRI report to Dr. Alexsander Barth

## 2021-04-22 ENCOUNTER — HOSPITAL ENCOUNTER (OUTPATIENT)
Dept: PHYSICAL THERAPY | Age: 61
Discharge: HOME | End: 2021-04-22
Payer: COMMERCIAL

## 2021-04-22 PROCEDURE — 97163 PT EVAL HIGH COMPLEX 45 MIN: CPT

## 2021-04-22 NOTE — PROGRESS NOTES
[address] [address] [facility] [address] [facility], [address] [place] [address]  Ph: [phone]    Fax: [phone]    Initial Evaluation/Plan of Care/Statement of Necessity for Physical Therapy Services     Patient name: Douglas Villagran   : 1960  [x]  Patient  Verified Provider#: [id number]    Start of Care: 21       Onset Date:   Referral source: Susie Gonzales MD Return visit to MD: 21     Medical/Treatment Diagnosis: Low back pain [M54.5]  Neck pain [M54.2]    Payor: [address] [place] [address] / Plan: [address] / Product Type: Managed Care Medicaid /       Prior Hospitalization: see medical history     Comorbidities: depression, arthritis  Prior Level of Function: independent  Medications: Verified on Patient Summary List          Patient / Family readiness to learn indicated by: asking questions and interest  Persons(s) to be included in education: patient (P)  Barriers to Learning/Limitations: None  Patient Self Reported Health Status: poor  Rehabilitation Potential: fair    In time:0200pm   Out time:0255pm Total Treatment Time (min): 55   Total Timed Codes (min): 3 1:1 Treatment Time ( only): 3   Visit #: 1     SUBJECTIVE  Patient reports constant neck and back pain that started about ~ 2 years ago. L lower back pain worse with radicular symptoms on the L side including sharp/shooting pains, loses his balance on the L side, occasional pins and needles. 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 Pt has been seeing Dr Tia Marcano for over a year for the back/neck pain. Pt states he has tried different medications and had PT at this clinic a few months ago. Had to stop due to insurance issues but has a new script now to return. Pt states he is down to getting surgery or having injections.  X-ray imaging report showed cervical disc disorder,lumbar DDD and lumbar faced joint arthopathy. Pt had an MRI a few weeks ago which showed multilevel degenerative changes with congenital narrowing of the thecal sac. Moderate to severe thecal sac stenosis at L4/5 with moderate mar neural foraminal narrowing. Pt reports pain is constant and debilitating. He has difficulty performing regular activities like yardwork, housework, limits his walking/doesn't go out to the store, difficulty walking out to his mailbox \"I have to stop every 30 yards. \"    Mechanism of Injury: unknown, chronic, work hx   Previous Treatment/Compliance: PT 2-3 months ago  PMHx/Surgical Hx: none significant  Work Hx: worked doing darwin/lifting, etc for many years  Living Situation: has ramp to enter home, single level home, lives with his daughter and fiance  Barriers to progress: chronicity of symptoms, severity of stenosis  Substance use: none  Cognition: A & O x 4     PAIN:  Area of pain: lower back and L leg, neck   Pain Level (0-10 scale): 7.5/10, with occasional increase up to 10/10    Things that worsen pain: bad weather   Things that ease pain: nothing    Pain Level (0-10 scale) pre treatment: 7.5/10  Pain Level (0-10 scale) post treatment: 7.5/10    OBJECTIVE    3 min Therapeutic Exercise:  [x] See flow sheet :   Rationale: increase ROM and increase strength to improve the patients ability to ambulate with decrease pain         With   [x] TE   [] TA   [] neuro   [] other: Patient Education: [x] Review HEP    [] Progressed/Changed HEP based on:   [] positioning   [] body mechanics   [] transfers   [] heat/ice application    [] other:      Objective/Functional Measures:     Physical Findings   Ortho:   Posture:  Rounded shoulders, forward head, right scapula wigging and more protracted   Palpation: Left UT C3-C7 paraspinals;  TTP L>R PSIS, L3-L5 parasinals with PA , radiating with L4/L5 , L piriformis  Gross findings:  R handed, right shoulder depress  Gait and Functional Mobility:  Slow gait, narrow ZEYAD, ambulates without AD. Spine: *normal values in ()  CERVICAL                                                ROM                                  Strength        Flexion  WFL  3+/5   Extension 15 degrees 3+/5        R L MMT     Lateral Flexion (45) 21  15  3+/5     Rotation (90) 44 44  3+/5      Additional comments: pain with each movement weak MMT on neck     TRUNK ROM:      Flexion:                                  []? N/A  []? WNL  []? Minimal  [x]? Moderate  []? Major pain in left side 40cm from floor   Extension:                             []? N/A  []? WNL  []? Minimal  []? Moderate  [x]? Major worse pain    Right Side Glide:                   []? N/A  []? WNL  []? Minimal  []? Moderate  [x]? Major 60 cm from floor   Right Lateral Flexion:           []? N/A  []? WNL  []? Minimal  []? Moderate  []? Major   Left Slide Glide:                    []? N/A  []? WNL  []? Minimal  []? Moderate  []? Major    Left Lateral Flexion:              []? N/A  []? WNL  []? Minimal  []? Moderate  [x]? Major 63 cm from floor   Rotation to the Right:           []? N/A  []? WNL  []? Minimal  [x]? Moderate  []? Major   Rotation to the Left:              []? N/A  []? WNL  []? Minimal  []? Moderate  [x]?  Major pain   Additional comments: pain on left back/glut area with majority of lumbar ROM     Specific joints:   SHOULDER                                         AROM                        PROM                       MMT    R L R L R L   Flexion (180) 115 105   3-/5 3-/5   Extension (60)             Abduction (180) 105 105   3-/5 3-/5   Adduction (0)               IR (70)               ER (90)               Horizontal Abduction (130)               Horizontal Adduction (45)               Additional comments: below 3/5 MMT throughout , pain with shrugging     Hip                                AROM                            PROM                              MMT    R L R L R L   Flexion (120)     3+ 3+   Extension (15)             Abduction (40)         Adduction (30)             IR (40)               ER (40)               Additional comments: Patient guarding and slight self limiting with mobility unable to formally assess. PROM of B hip stiffness and decreased range in flexion,abd and IR/ER    Knee                                 AROM                          PROM                           MMT    R L R L R L   Extension (0)          4- 4-   Flexion (145)  Prone          3-  3-       Ankle                                 AROM                       PROM                             MMT    R L R L R L   Dorsiflexion (15)         3+ 3+   Plantarflexion (50)         3- 3-     Neurological:   Reflexes/Sensations-intact to light touch                                                  Special Tests:               Slump: (+) on left LE               SLR: (+)on L                                                       Single leg stance: (L) LE 2 sec (R) 3 sec  Patient unsteady   Timed Up and GO (TUG) Test = 26 sec    ASSESSMENT/Changes in Function:   Pt is a 62 yr old male presenting to outpatient PT services with diagnosis of cervical and low back pain. Impairments include debilitating low back pain with L radicular symptoms, neck pain, limited cervical and trunk ROM, decrease core strength, balance deficits and instability especially on the L side with reports of frequent LOB, decrease transfers, slow/shuffling gait pattern. Impairments limit the pt's functional mobility and ADLs. Pt reports difficulty ambulating 100 ft to mailbox, stopping every 30 ft due to pain. Pt will benefit from skilled PT services to address impairments and allow [facility] to [facility]. Problem List/Impairments: pain affecting function, decrease ROM, decrease strength, impaired gait/ balance, decrease ADL/ functional abilitiies, decrease activity tolerance, decrease flexibility/ joint mobility and decrease transfer abilities  Patient Goal (s): relieve pain  Short Term Goals:  To be accomplished in 5 treatments:  1. Pt will report compliance to a progressive HEP  2. Pt will report decrease pain to max of 7/10.  3. Pt will demo improved cervical ROM and rotation by 10 degrees  Long Term Goals: To be accomplished in 10 treatments:  1. Pt will demo bilateral SLS >5 seconds to improve stability with gait. 2. Pt will improve TUG score to <15 seconds. 3. Pt will report ability to walk to the mailbox without needing to stop due to pain. 4. Pt will report ability to sleep at night in his bed without waking multiple times due to pain/discomfort. Frequency / Duration: Patient to be seen 2 times per week for 10 treatments. Certification Period: 4/22/21 - 7/21/22  Treatment Plan may include any combination of the following: Therapeutic exercise, Therapeutic activities, Neuromuscular re-education, Physical agent/modality, Gait/balance training, Manual therapy, Patient education, Self Care training, Functional mobility training, Home safety training and Stair training    Patient/ Caregiver education and instruction: exercises    [x]  Plan of care has been reviewed with PTA. The Plan of Care is based on information from the initial evaluation. Phi Singh, PT, DPT 4/22/2021     ________________________________________________________________________    I certify that the above Therapy Services are being furnished while the patient is under my care. I agree with the treatment plan and certify that this therapy is necessary.     [de-identified] Signature:____________________  Date:____________Time: _________

## 2021-05-14 ENCOUNTER — HOSPITAL ENCOUNTER (OUTPATIENT)
Dept: PHYSICAL THERAPY | Age: 61
Discharge: HOME | End: 2021-05-14
Payer: COMMERCIAL

## 2021-05-14 PROCEDURE — 97110 THERAPEUTIC EXERCISES: CPT

## 2021-05-14 NOTE — PROGRESS NOTES
PT DAILY TREATMENT NOTE  [facility]     Patient Name: Snehal Hastings  Date:2021  : 1960  [x]  Patient  Verified  Payor: [address] / Plan: [address] / Product Type: Managed Care Medicaid /    Treatment Area: Low back pain [M54.5]  Neck pain [M54.2]       Next MD APPT: 21  In time:1:09pm  Out time:1:50pm  Total Treatment Time (min): 41  Visit #: 2    SUBJECTIVE  Pain Level (0-10 scale) pre treatment: 5-6      Pain Level (0-10 scale) post treatment: 5  Any medication changes, allergies to medications, adverse drug reactions, diagnosis change, or new procedure performed?:   [] No    [] Yes (see summary sheet for update)  Subjective functional status/changes:   [] No changes reported  Pt states he recently had an injection along the L side of his back. Pt reports pain is a little better now but still present. No pain in the neck upon arrival.      OBJECTIVE    40 min Therapeutic Exercise:  [x] See flow sheet :   Rationale: increase ROM and increase strength to improve the patients ability to move around without neck or back pain     With   [x] TE   [] TA   [] Neuro   [] SC   [] other: Patient Education: [x] Review HEP    [] Progressed/Changed HEP based on:   [] positioning   [] body mechanics   [] transfers   [] Use of heat/ice     [] other:         Other Objective/Functional Measures: Initiated exercises in clinic      ASSESSMENT/Changes in Function:   Pt responded well to tx with no increase back pain at end of session. Pt with significant tightness in hips noted with hip flexion. Also noted quad weakness, +extensor lag with SLR. Pt did not c/o neck pain throughout session. Less overall pain reported following recent injections. Will continue current POC.     Patient will continue to benefit from skilled PT services to modify and progress therapeutic interventions, address ROM deficits, address strength deficits, analyze and address soft tissue restrictions, analyze and cue movement patterns and assess and modify postural abnormalities to attain remaining goals. GOALS/Progress towards goals:  Patient Goal (s): relieve pain  Short Term Goals: To be accomplished in 5 treatments:  1. Pt will report compliance to a progressive HEP  2. Pt will report decrease pain to max of 7/10.  3. Pt will demo improved cervical ROM and rotation by 10 degrees  Long Term Goals: To be accomplished in 10 treatments:  1. Pt will demo bilateral SLS >5 seconds to improve stability with gait. 2. Pt will improve TUG score to <15 seconds. 3. Pt will report ability to walk to the mailbox without needing to stop due to pain. 4. Pt will report ability to sleep at night in his bed without waking multiple times due to pain/discomfort.   PLAN  [x]  Upgrade activities as tolerated     [x]  Continue plan of care  [x]  Update interventions per flow sheet       []  Discharge due to:_  []  Other:_      Cortney Diggs, PT, DPT 5/14/2021

## 2021-05-17 ENCOUNTER — APPOINTMENT (OUTPATIENT)
Dept: PHYSICAL THERAPY | Age: 61
End: 2021-05-17
Payer: COMMERCIAL

## 2021-05-20 ENCOUNTER — HOSPITAL ENCOUNTER (OUTPATIENT)
Dept: PHYSICAL THERAPY | Age: 61
Discharge: HOME | End: 2021-05-20
Payer: COMMERCIAL

## 2021-05-20 PROCEDURE — 97110 THERAPEUTIC EXERCISES: CPT

## 2021-05-20 NOTE — PROGRESS NOTES
PT DAILY TREATMENT NOTE  [facility]     Patient Name: Elie Barrow  Date:2021  : 1960  [x]  Patient  Verified  Payor: [address] [place] [address] Road / Plan: [address] / Product Type: Managed Care Medicaid /    Treatment Area: Low back pain [M54.5]  Neck pain [M54.2]       Next MD APPT: 21  In time:0215pm  Out time: 0249pm  Total Treatment Time (min): 34  Visit #: 3    SUBJECTIVE  Pain Level (0-10 scale) pre treatment: 7.5/10      Pain Level (0-10 scale) post treatment: 8/10  Any medication changes, allergies to medications, adverse drug reactions, diagnosis change, or new procedure performed?:   [] No    [] Yes (see summary sheet for update)  Subjective functional status/changes:   [] No changes reported  Pt is now a few weeks out from his injection and reports the pain relief is starting to wear off. Will be going back to orthopedic doctor 21 for further injections. Was also advised he may need surgery if the injections do not work. Neck pain is a constant throb but mild compared to the low back. OBJECTIVE    34 min Therapeutic Exercise:  [x] See flow sheet :   Rationale: increase ROM and increase strength to improve the patients ability to move around without neck or back pain     With   [x] TE   [] TA   [] Neuro   [] SC   [] other: Patient Education: [x] Review HEP    [] Progressed/Changed HEP based on:   [] positioning   [] body mechanics   [] transfers   [] Use of heat/ice     [] other:         Other Objective/Functional Measures: increase pain behaviors today, guarding bilaterally L>R with stretching      ASSESSMENT/Changes in Function:   Pt with decrease exercise tolerance today, increase pain as injection relief is wearing off. Continued muscle tightness through lower back and hip with core and LE weakness. Minimal pain in the neck. No difficulty with cervical exercises.   Patient will continue to benefit from skilled PT services to modify and progress therapeutic interventions, address ROM deficits, address strength deficits, analyze and address soft tissue restrictions, analyze and cue movement patterns and assess and modify postural abnormalities to attain remaining goals. GOALS/Progress towards goals:  Patient Goal (s): relieve pain  Short Term Goals: To be accomplished in 5 treatments:  1. Pt will report compliance to a progressive HEP  2. Pt will report decrease pain to max of 7/10.  3. Pt will demo improved cervical ROM and rotation by 10 degrees  Long Term Goals: To be accomplished in 10 treatments:  1. Pt will demo bilateral SLS >5 seconds to improve stability with gait. 2. Pt will improve TUG score to <15 seconds. 3. Pt will report ability to walk to the mailbox without needing to stop due to pain. 4. Pt will report ability to sleep at night in his bed without waking multiple times due to pain/discomfort.   PLAN  [x]  Upgrade activities as tolerated     [x]  Continue plan of care  [x]  Update interventions per flow sheet       []  Discharge due to:_  []  Other:_      Roberto Winters PT, DPT 5/20/2021

## 2021-05-28 ENCOUNTER — APPOINTMENT (OUTPATIENT)
Dept: PHYSICAL THERAPY | Age: 61
End: 2021-05-28
Payer: COMMERCIAL

## 2021-06-01 ENCOUNTER — OFFICE VISIT (OUTPATIENT)
Dept: INTERNAL MEDICINE CLINIC | Age: 61
End: 2021-06-01
Payer: COMMERCIAL

## 2021-06-01 VITALS
HEIGHT: 71 IN | TEMPERATURE: 97.6 F | DIASTOLIC BLOOD PRESSURE: 85 MMHG | SYSTOLIC BLOOD PRESSURE: 129 MMHG | WEIGHT: 248 LBS | HEART RATE: 73 BPM | BODY MASS INDEX: 34.72 KG/M2 | OXYGEN SATURATION: 96 % | RESPIRATION RATE: 16 BRPM

## 2021-06-01 DIAGNOSIS — J30.2 SEASONAL ALLERGIC RHINITIS, UNSPECIFIED TRIGGER: Primary | ICD-10-CM

## 2021-06-01 DIAGNOSIS — M54.9 CHRONIC MIDLINE BACK PAIN, UNSPECIFIED BACK LOCATION: ICD-10-CM

## 2021-06-01 DIAGNOSIS — G89.29 CHRONIC MIDLINE BACK PAIN, UNSPECIFIED BACK LOCATION: ICD-10-CM

## 2021-06-01 PROCEDURE — 99214 OFFICE O/P EST MOD 30 MIN: CPT | Performed by: INTERNAL MEDICINE

## 2021-06-01 RX ORDER — IBUPROFEN 800 MG/1
800 TABLET ORAL AS NEEDED
COMMUNITY
Start: 2020-09-15 | End: 2022-04-06 | Stop reason: ALTCHOICE

## 2021-06-01 RX ORDER — MONTELUKAST SODIUM 10 MG/1
10 TABLET ORAL DAILY
Qty: 90 TABLET | Refills: 0 | Status: SHIPPED | OUTPATIENT
Start: 2021-06-01 | End: 2021-09-27

## 2021-06-01 RX ORDER — GABAPENTIN 300 MG/1
300 CAPSULE ORAL 3 TIMES DAILY
COMMUNITY
Start: 2021-02-23 | End: 2022-06-20 | Stop reason: SDUPTHER

## 2021-06-01 RX ORDER — FEXOFENADINE HCL AND PSEUDOEPHEDRINE HCI 180; 240 MG/1; MG/1
1 TABLET, EXTENDED RELEASE ORAL DAILY
Qty: 90 TABLET | Refills: 0 | Status: SHIPPED | OUTPATIENT
Start: 2021-06-01 | End: 2021-08-30

## 2021-06-01 NOTE — PROGRESS NOTES
Dean Talamantes is a 62 y.o. male and presents with Follow-up, Back Pain (Has been seeing [facility] for cortisone injections), and Allergies (Sneezing, runny eyes, itchy ears )  Patient presents for follow-up doing well sees Dr. Omar Thomson and recently Dr. Darylene Rickers however for chronic back pain MRI suggest the need for possible orthopedic or neurosurgical surgery the patient wants to wait until the next steroid injection from Dr. Darylene Rickers pain management and if no better may proceed in that line will discuss further with orthopedist.  Blood pressure 120/80 right arm we discussed patient's last labs with the patient current medications-also notes seasonal allergies that is quite troubling not helped by Claritin or other nonsedating over-the-counter antihistamines but will try Allegra with pseudoephedrine along with montelukast.  No wheezing today no chest congestion           Review of Systems  Constitutional: negative for fevers, chills, anorexia, weight loss and fatigue,no insomnia  Eyes:   negative for visual disturbance and irritation, eye discharge, eye pain. no eye redness. ENT:   negative for tinnitus, sore throat, nasal congestion, ear pain, hoarseness, hearing loss.,no snoring. Seasonal type allergies with rhinitis  Respiratory:  negative for cough, hemoptysis, shortness of breath, wheezing,  CV:   negative for chest pain, palpitations, lower extremity edema, shortness of breath while sitting, walking or at night  GI:   negative for nausea, vomiting, diarrhea, abdominal pain,melena,constipation. Endo:               negative for polyuria, polydipsia, polyphagia, cold or heat intolerance,hair loss. Genitourinary: negative for frequency, dysuria and hematuria,urethral discharge,nocturia.straining while urination,urinary incontinence.   Integument:  negative for rash and pruritus  Hematologic:  negative for easy bruising and gum/nose bleeding, enlarged nodes  Musculoskel: negative for myalgias, arthralgias, noted low back pain, muscle weakness, joint pain, h/o fall,cramps,calf pain. Neurological:  negative for headaches, dizziness, vertigo, memory problems, gait and seizures loss of consciousness,no ataxia. Behavl/Psych: negative for feelings of anxiety, depression, mood changes ,sadness    Past Medical History:   Diagnosis Date    Gout      Past Surgical History:   Procedure Laterality Date    HX HERNIA REPAIR      twice    HX ORTHOPAEDIC      hand     Social History     Socioeconomic History    Marital status:      Spouse name: Not on file    Number of children: Not on file    Years of education: Not on file    Highest education level: Not on file   Tobacco Use    Smoking status: Current Some Day Smoker     Packs/day: 0.25     Years: 30.00     Pack years: 7.50     Types: Cigarettes    Smokeless tobacco: Never Used    Tobacco comment: pt states 3 times a week   Vaping Use    Vaping Use: Never used   Substance and Sexual Activity    Alcohol use: Yes     Comment: occasionally    Drug use: Never    Sexual activity: Yes     Partners: Female     Social Determinants of Health     Financial Resource Strain:     Difficulty of Paying Living Expenses:    Food Insecurity:     Worried About Running Out of Food in the Last Year:     Ran Out of Food in the Last Year:    Transportation Needs:     Lack of Transportation (Medical):      Lack of Transportation (Non-Medical):    Physical Activity:     Days of Exercise per Week:     Minutes of Exercise per Session:    Stress:     Feeling of Stress :    Social Connections:     Frequency of Communication with Friends and Family:     Frequency of Social Gatherings with Friends and Family:     Attends Judaism Services:     Active Member of Clubs or Organizations:     Attends Club or Organization Meetings:     Marital Status:      Family History   Problem Relation Age of Onset    Cancer Mother     Diabetes Sister     Heart Disease Brother      Current Outpatient Medications   Medication Sig Dispense Refill    ibuprofen (MOTRIN) 800 mg tablet Take 800 mg by mouth as needed.  gabapentin (NEURONTIN) 300 mg capsule Take 300 mg by mouth as needed.  montelukast (SINGULAIR) 10 mg tablet Take 1 Tablet by mouth daily for 90 days. 90 Tablet 0    fexofenadine-pseudoephedrine (ALLEGRA-D 24) 180-240 mg per tablet Take 1 Tablet by mouth daily for 90 days. 90 Tablet 0    baclofen (LIORESAL) 10 mg tablet Take 10 mg by mouth two (2) times daily as needed. No Known Allergies    Objective:  Visit Vitals  /85 (BP 1 Location: Left arm, BP Patient Position: Sitting)   Pulse 73   Temp 97.6 °F (36.4 °C) (Oral)   Resp 16   Ht 5' 11\" (1.803 m)   Wt 248 lb (112.5 kg)   SpO2 96%   BMI 34.59 kg/m²       Physical Exam:   Constitutional: General Appearance: healthy-appearing and obese. Level of Distress: NAD. Ambulation: ambulating normally. Psychiatric: Mental Status: normal mood and affect and active and alert. Orientation: to time, place, and person. no agitation. ,normal eye contact. normal insight  Head: Head: normocephalic and atraumatic. Eyes: Pupils: PERRLA. Sclerae: non-icteric. ENMT: No lesions on external ear, no hearing loss. No lesions on external nose, sinus tenderness, or nasal discharge. Lips, Teeth, and no mouth or lip ulcers   Neck: Neck: supple, trachea midline, and no masses. Lymph Nodes: no cervical LAD. Thyroid: no enlargement or nodules and non-tender. Lungs: Respiratory effort: no dyspnea. Auscultation: no wheezing, rales/crackles, or rhonchi and breath sounds normal and good air movement. Cardiovascular: Apical Impulse: not displaced. Heart Auscultation: normal S1 and S2; no murmurs, rubs, or gallops; and RRR. Neck vessels: no carotid bruits. Pulses including femoral / pedal: normal throughout. Abdomen: Bowel Sounds: normal. Inspection and Palpation: no tenderness, guarding, or masses and soft and non-distended.  Liver: non-tender Musculoskeletal[de-identified] Extremities: no edema or varicosities. Calf tenderness. DJD  Neurologic: Gait and Station: normal gait and station. Motor Strength normal right and left. Sensory and cerebellar intact. Skin: Inspection and palpation: no rash, lesions, or ulcer. Results for orders placed or performed in visit on 03/29/21   CBC WITH AUTOMATED DIFF   Result Value Ref Range    WBC 5.3 3.4 - 10.8 x10E3/uL    RBC 4.56 4.14 - 5.80 x10E6/uL    HGB 13.7 13.0 - 17.7 g/dL    HCT 40.9 37.5 - 51.0 %    MCV 90 79 - 97 fL    MCH 30.0 26.6 - 33.0 pg    MCHC 33.5 31.5 - 35.7 g/dL    RDW 13.1 11.6 - 15.4 %    PLATELET 221 298 - 941 x10E3/uL    NEUTROPHILS 44 Not Estab. %    Lymphocytes 43 Not Estab. %    MONOCYTES 9 Not Estab. %    EOSINOPHILS 3 Not Estab. %    BASOPHILS 1 Not Estab. %    ABS. NEUTROPHILS 2.3 1.4 - 7.0 x10E3/uL    Abs Lymphocytes 2.3 0.7 - 3.1 x10E3/uL    ABS. MONOCYTES 0.5 0.1 - 0.9 x10E3/uL    ABS. EOSINOPHILS 0.1 0.0 - 0.4 x10E3/uL    ABS. BASOPHILS 0.0 0.0 - 0.2 x10E3/uL    IMMATURE GRANULOCYTES 0 Not Estab. %    ABS. IMM. GRANS. 0.0 0.0 - 0.1 R54E0/YF   METABOLIC PANEL, COMPREHENSIVE   Result Value Ref Range    Glucose 103 (H) 65 - 99 mg/dL    BUN 12 6 - 24 mg/dL    Creatinine 0.99 0.76 - 1.27 mg/dL    GFR est non-AA 83 >59 mL/min/1.73    GFR est AA 96 >59 mL/min/1.73    BUN/Creatinine ratio 12 9 - 20    Sodium 144 134 - 144 mmol/L    Potassium 4.3 3.5 - 5.2 mmol/L    Chloride 107 (H) 96 - 106 mmol/L    CO2 21 20 - 29 mmol/L    Calcium 8.5 (L) 8.7 - 10.2 mg/dL    Protein, total 6.6 6.0 - 8.5 g/dL    Albumin 3.9 3.8 - 4.9 g/dL    GLOBULIN, TOTAL 2.7 1.5 - 4.5 g/dL    A-G Ratio 1.4 1.2 - 2.2    Bilirubin, total 0.4 0.0 - 1.2 mg/dL    Alk.  phosphatase 90 39 - 117 IU/L    AST (SGOT) 19 0 - 40 IU/L    ALT (SGPT) 20 0 - 44 IU/L   TSH 3RD GENERATION   Result Value Ref Range    TSH 0.927 0.450 - 4.500 uIU/mL   LIPID PANEL   Result Value Ref Range    Cholesterol, total 225 (H) 100 - 199 mg/dL    Triglyceride 272 (H) 0 - 149 mg/dL    HDL Cholesterol 50 >39 mg/dL    VLDL, calculated 48 (H) 5 - 40 mg/dL    LDL, calculated 127 (H) 0 - 99 mg/dL   PSA W/ REFLX FREE PSA   Result Value Ref Range    Prostate Specific Ag 0.6 0.0 - 4.0 ng/mL    Reflex Criteria Comment        Assessment/Plan:    ICD-10-CM ICD-9-CM    1. Seasonal allergic rhinitis, unspecified trigger  J30.2 477.9    2. Chronic midline back pain, unspecified back location  M54.9 724.5     G89.29 338.29      Orders Placed This Encounter    ibuprofen (MOTRIN) 800 mg tablet     Sig: Take 800 mg by mouth as needed.  gabapentin (NEURONTIN) 300 mg capsule     Sig: Take 300 mg by mouth as needed.  montelukast (SINGULAIR) 10 mg tablet     Sig: Take 1 Tablet by mouth daily for 90 days. Dispense:  90 Tablet     Refill:  0    fexofenadine-pseudoephedrine (ALLEGRA-D 24) 180-240 mg per tablet     Sig: Take 1 Tablet by mouth daily for 90 days. Dispense:  90 Tablet     Refill:  0       call if any problems    There are no Patient Instructions on file for this visit. Follow-up and Dispositions    · Return in about 3 months (around 9/1/2021).

## 2021-06-01 NOTE — PROGRESS NOTES
Chief Complaint   Patient presents with    Follow-up    Back Pain     Has been seeing [facility] for cortisone injections    Allergies     Sneezing, runny eyes, itchy ears      Visit Vitals  /85 (BP 1 Location: Left arm, BP Patient Position: Sitting)   Pulse 73   Temp 97.6 °F (36.4 °C) (Oral)   Resp 16   Ht 5' 11\" (1.803 m)   Wt 248 lb (112.5 kg)   SpO2 96%   BMI 34.59 kg/m²       1. Have you been to the ER, urgent care clinic since your last visit? Hospitalized since your last visit? No    2. Have you seen or consulted any other health care providers outside of the [address] [place] [address] since your last visit? Include any pap smears or colon screening.  Yes When: 5/2021 Where: [facility] Reason for visit: BACK PROBLEM, CORTISONE INJECTIONS

## 2021-08-23 ENCOUNTER — HOSPITAL ENCOUNTER (EMERGENCY)
Age: 61
Discharge: HOME | End: 2021-08-23
Attending: STUDENT IN AN ORGANIZED HEALTH CARE EDUCATION/TRAINING PROGRAM
Payer: COMMERCIAL

## 2021-08-23 VITALS
WEIGHT: 245 LBS | SYSTOLIC BLOOD PRESSURE: 126 MMHG | BODY MASS INDEX: 34.3 KG/M2 | RESPIRATION RATE: 17 BRPM | HEIGHT: 71 IN | DIASTOLIC BLOOD PRESSURE: 75 MMHG | OXYGEN SATURATION: 95 % | TEMPERATURE: 98.5 F | HEART RATE: 83 BPM

## 2021-08-23 DIAGNOSIS — R52 BODY ACHES: ICD-10-CM

## 2021-08-23 DIAGNOSIS — R63.0 DECREASED APPETITE: Primary | ICD-10-CM

## 2021-08-23 LAB
COVID-19 RAPID TEST, COVR: NOT DETECTED
GLUCOSE BLD STRIP.AUTO-MCNC: 109 MG/DL (ref 65–117)
PERFORMED BY, TECHID: NORMAL
SARS-COV-2, COV2: NORMAL
SPECIMEN SOURCE: NORMAL

## 2021-08-23 PROCEDURE — 99283 EMERGENCY DEPT VISIT LOW MDM: CPT

## 2021-08-23 PROCEDURE — 82962 GLUCOSE BLOOD TEST: CPT

## 2021-08-23 PROCEDURE — 87635 SARS-COV-2 COVID-19 AMP PRB: CPT

## 2021-08-23 RX ORDER — FAMOTIDINE 20 MG/1
20 TABLET, FILM COATED ORAL 2 TIMES DAILY
Qty: 20 TABLET | Refills: 0 | Status: SHIPPED | OUTPATIENT
Start: 2021-08-23 | End: 2021-09-02

## 2021-08-23 RX ORDER — ONDANSETRON 4 MG/1
4 TABLET, ORALLY DISINTEGRATING ORAL
Qty: 10 TABLET | Refills: 0 | Status: SHIPPED | OUTPATIENT
Start: 2021-08-23 | End: 2022-04-06 | Stop reason: ALTCHOICE

## 2021-08-23 NOTE — ED PROVIDER NOTES
EMERGENCY DEPARTMENT HISTORY AND PHYSICAL EXAM      Date: 8/23/2021  Patient Name: Robert Cash    History of Presenting Illness     CC: Body aches, poor appetite    History Provided By: Patient    HPI: Robert Cash, 62 y.o. male with a past medical history significant Chronic back pain, hypercholesterol presents to the ED with cc of decreased appetite over the last week, left-sided back pain which is chronic, muscle aches fatigue. Patient denies any fevers chills denies any nausea or vomiting. Patient states over the last week he has noticed some worsening appetite, feeling full, additionally feels fatigued. Patient denies any cough, runny nose sore throat, no chest pains no shortness of breath. Patient has chronic back pain denies any new pain to his lower back, no numbness tingling extremities no weakness. There are no other complaints, changes, or physical findings at this time. PCP: Arnulfo Gao MD    No current facility-administered medications on file prior to encounter. Current Outpatient Medications on File Prior to Encounter   Medication Sig Dispense Refill    ibuprofen (MOTRIN) 800 mg tablet Take 800 mg by mouth as needed.  gabapentin (NEURONTIN) 300 mg capsule Take 300 mg by mouth as needed.  montelukast (SINGULAIR) 10 mg tablet Take 1 Tablet by mouth daily for 90 days. 90 Tablet 0    fexofenadine-pseudoephedrine (ALLEGRA-D 24) 180-240 mg per tablet Take 1 Tablet by mouth daily for 90 days. 90 Tablet 0    baclofen (LIORESAL) 10 mg tablet Take 10 mg by mouth two (2) times daily as needed.          Past History     Past Medical History:  Past Medical History:   Diagnosis Date    Gout        Past Surgical History:  Past Surgical History:   Procedure Laterality Date    HX HERNIA REPAIR      twice    HX ORTHOPAEDIC      hand       Family History:  Family History   Problem Relation Age of Onset    Cancer Mother     Diabetes Sister     Heart Disease Brother        Social History:  Social History     Tobacco Use    Smoking status: Current Some Day Smoker     Packs/day: 0.25     Years: 30.00     Pack years: 7.50     Types: Cigarettes    Smokeless tobacco: Never Used    Tobacco comment: pt states 3 times a week   Vaping Use    Vaping Use: Never used   Substance Use Topics    Alcohol use: Yes     Comment: occasionally    Drug use: Never       Allergies:  No Known Allergies      Review of Systems     Review of Systems   Constitutional: Positive for appetite change and fatigue. Negative for activity change, chills and fever. HENT: Negative for congestion, sore throat and trouble swallowing. Eyes: Negative for photophobia and visual disturbance. Respiratory: Negative for cough, chest tightness and shortness of breath. Cardiovascular: Negative for chest pain and palpitations. Gastrointestinal: Negative for abdominal pain, constipation, nausea and vomiting. Endocrine: Positive for polydipsia and polyuria. Genitourinary: Negative for dysuria and flank pain. Musculoskeletal: Negative for arthralgias and neck pain. Skin: Negative for color change and pallor. Neurological: Negative for dizziness, weakness, numbness and headaches. Physical Exam     Physical Exam  Vitals and nursing note reviewed. Constitutional:       Appearance: Normal appearance. He is normal weight. HENT:      Head: Normocephalic and atraumatic. Nose: Nose normal.      Mouth/Throat:      Mouth: Mucous membranes are moist.   Eyes:      Extraocular Movements: Extraocular movements intact. Pupils: Pupils are equal, round, and reactive to light. Cardiovascular:      Rate and Rhythm: Normal rate and regular rhythm. Pulses: Normal pulses. Heart sounds: Normal heart sounds. Pulmonary:      Breath sounds: Normal breath sounds. Abdominal:      General: Abdomen is flat. Bowel sounds are normal.      Palpations: Abdomen is soft.    Musculoskeletal:         General: No swelling or tenderness. Normal range of motion. Cervical back: Normal range of motion and neck supple. Skin:     General: Skin is warm and dry. Capillary Refill: Capillary refill takes less than 2 seconds. Neurological:      General: No focal deficit present. Mental Status: He is alert and oriented to person, place, and time. Cranial Nerves: No cranial nerve deficit. Sensory: No sensory deficit. Motor: No weakness. Psychiatric:         Mood and Affect: Mood normal.         Behavior: Behavior normal.         Diagnostic Study Results     Labs -     Recent Results (from the past 12 hour(s))   SARS-COV-2    Collection Time: 08/23/21  2:30 PM   Result Value Ref Range    SARS-CoV-2 Please find results under separate order     COVID-19 RAPID TEST    Collection Time: 08/23/21  2:30 PM   Result Value Ref Range    Specimen source Nasopharyngeal      COVID-19 rapid test Not Detected Not Detected     GLUCOSE, POC    Collection Time: 08/23/21  2:38 PM   Result Value Ref Range    Glucose (POC) 109 65 - 117 mg/dL    Performed by [facility]        Radiologic Studies -   @lastxrresult@  CT Results  (Last 48 hours)    None        CXR Results  (Last 48 hours)    None            Medical Decision Making   I am the first provider for this patient. I reviewed the vital signs, available nursing notes, past medical history, past surgical history, family history and social history. Vital Signs-Reviewed the patient's vital signs.   Patient Vitals for the past 12 hrs:   Temp Pulse Resp BP SpO2   08/23/21 1157 98.5 °F (36.9 °C) 83 17 126/75 95 %       Records Reviewed: Nursing Notes    The patient presents with generalized weakness, decreased appetite, back pain with a differential diagnosis of dehydration, viral gastritis, COVID-19,      Provider Notes (Medical Decision Making):     MDM   60-year-old male, history of hypercholesterolemia, chronic back pain, presents to emergency department for decreased appetite, pain, weakness. Physical exam shows well-appearing male, no distress, stable vitals, no spinous process tenderness, abdomen soft nontender nondistended. Patient noted that he also has been increased thirst and urination of late, has never had a history of diabetes. Will order COVID-19 test, fingerstick, most likely discharge patient home with Zofran and Pepcid. Patient's glucose 109, no suspicion for diabetes. Will dc patient home as planned after covid test, instructed patient to f/u with Primary care physician as appointed, return to the ED if any worsening weakness, dizziness, abdominal pain, nausea/vomiting. ED Course:   Initial assessment performed. The patients presenting problems have been discussed, and they are in agreement with the care plan formulated and outlined with them. I have encouraged them to ask questions as they arise throughout their visit. PROCEDURES  Procedures         PLAN:  1. Discharge Medication List as of 8/23/2021  3:20 PM      START taking these medications    Details   ondansetron (Zofran ODT) 4 mg disintegrating tablet Take 1 Tablet by mouth every eight (8) hours as needed for Nausea., Normal, Disp-10 Tablet, R-0      famotidine (Pepcid) 20 mg tablet Take 1 Tablet by mouth two (2) times a day for 10 days. , Normal, Disp-20 Tablet, R-0         CONTINUE these medications which have NOT CHANGED    Details   ibuprofen (MOTRIN) 800 mg tablet Take 800 mg by mouth as needed., Historical Med      gabapentin (NEURONTIN) 300 mg capsule Take 300 mg by mouth as needed., Historical Med      montelukast (SINGULAIR) 10 mg tablet Take 1 Tablet by mouth daily for 90 days. , Normal, Disp-90 Tablet, R-0      fexofenadine-pseudoephedrine (ALLEGRA-D 24) 180-240 mg per tablet Take 1 Tablet by mouth daily for 90 days. , Normal, Disp-90 Tablet, R-0      baclofen (LIORESAL) 10 mg tablet Take 10 mg by mouth two (2) times daily as needed., Historical Med 2.   Follow-up Information     Follow up With Specialties Details Why Contact Info    Maureen Abdi MD Internal Medicine In 1 week  [address] [facility] [address] [phone]      [facility] EMERGENCY DEPT Emergency Medicine  If symptoms worsen [address] [facility] [address] [phone]        Return to ED if worse     Diagnosis     Clinical Impression:   1. Decreased appetite    2.  Body aches

## 2021-08-26 NOTE — PROGRESS NOTES
[address] [address] [facility] [address] [facility], [address] [place] [address]  Ph: [phone]  Fax: [phone]    Medicaid Discharge Summary 2-15    Patient name: Danielle Ghotra  : 1960  Provider#: [id number]  Referral source: Cristina Canada MD      Medical/Treatment Diagnosis: Low back pain [M54.5]  Neck pain [M54.2]     Prior Hospitalization: see medical history     Comorbidities: See Plan of Care  Prior Level of Function: See Plan of Care  Medications: Verified on Patient Summary List  Start of Care: 21   Onset Date:   Visits from Start of Care: 3  Missed Visits: 3  Certification Period : 21 to 21    Assessment/Summary of care/GOALS: Pt was seen for 3 visits following evaluation 21. Pt reported increasing pain levels due to injection wearing off. Pt had fair attendance with PT services and cancelled last scheduled appointments due to family emergency. He called back to return to therapy 7/15/21 and was told he would need a new order to return to therapy. Discharge today as we have not heard back from the patient. Thank you for this referral.    Patient Goal (s): relieve pain  Short Term Goals: To be accomplished in 5 treatments:  1. Pt will report compliance to a progressive HEP  2. Pt will report decrease pain to max of 7/10.  3. Pt will demo improved cervical ROM and rotation by 10 degrees  Long Term Goals: To be accomplished in 10 treatments:  1. Pt will demo bilateral SLS >5 seconds to improve stability with gait. 2. Pt will improve TUG score to <15 seconds. 3. Pt will report ability to walk to the mailbox without needing to stop due to pain. 4. Pt will report ability to sleep at night in his bed without waking multiple times due to pain/discomfort.     RECOMMENDATIONS:  [x]Discontinue therapy: []Patient has reached or is progressing toward set goals and is independent with HEP     [x]Patient is non-compliant or has abdicated     []Due to lack of appreciable progress towards set goals     []Srikanth Cottrell PT, DPT 8/26/2021   Retain this original for your records. If you are unable to process this request in 24 hours, please contact our office.   ________________________________________________________________________  NOTE TO PHYSICIAN:  Please complete the following and FAX to: [address]:  Fax: [phone]   ____ I have read the above report and request that my patient be discharged from therapy.     Physician's Signature:_____________________________________________________ Date:_____________Time:_____________     Queta Sharif MD

## 2021-09-27 RX ORDER — MONTELUKAST SODIUM 10 MG/1
TABLET ORAL
Qty: 90 TABLET | Refills: 0 | Status: SHIPPED | OUTPATIENT
Start: 2021-09-27 | End: 2022-04-06 | Stop reason: SDUPTHER

## 2021-09-28 ENCOUNTER — OFFICE VISIT (OUTPATIENT)
Dept: INTERNAL MEDICINE CLINIC | Age: 61
End: 2021-09-28
Payer: COMMERCIAL

## 2021-09-28 VITALS
SYSTOLIC BLOOD PRESSURE: 118 MMHG | TEMPERATURE: 98.5 F | HEIGHT: 71 IN | BODY MASS INDEX: 34.44 KG/M2 | WEIGHT: 246 LBS | OXYGEN SATURATION: 96 % | RESPIRATION RATE: 18 BRPM | DIASTOLIC BLOOD PRESSURE: 76 MMHG | HEART RATE: 80 BPM

## 2021-09-28 DIAGNOSIS — M54.16 LUMBAR RADICULITIS: ICD-10-CM

## 2021-09-28 DIAGNOSIS — M54.40 CHRONIC LOW BACK PAIN WITH SCIATICA, SCIATICA LATERALITY UNSPECIFIED, UNSPECIFIED BACK PAIN LATERALITY: Primary | ICD-10-CM

## 2021-09-28 DIAGNOSIS — G89.29 CHRONIC LOW BACK PAIN WITH SCIATICA, SCIATICA LATERALITY UNSPECIFIED, UNSPECIFIED BACK PAIN LATERALITY: Primary | ICD-10-CM

## 2021-09-28 DIAGNOSIS — E66.3 OVERWEIGHT: ICD-10-CM

## 2021-09-28 DIAGNOSIS — E78.00 PURE HYPERCHOLESTEROLEMIA: ICD-10-CM

## 2021-09-28 DIAGNOSIS — J30.2 SEASONAL ALLERGIC RHINITIS, UNSPECIFIED TRIGGER: ICD-10-CM

## 2021-09-28 DIAGNOSIS — Z79.899 ENCOUNTER FOR DRUG THERAPY: ICD-10-CM

## 2021-09-28 PROCEDURE — 99214 OFFICE O/P EST MOD 30 MIN: CPT | Performed by: INTERNAL MEDICINE

## 2021-09-28 RX ORDER — DEXTROMETHORPHAN HB/DOXYLAMINE 15-6.25/15
1 SOLUTION, ORAL ORAL DAILY
Qty: 30 TABLET | Refills: 2 | Status: SHIPPED | OUTPATIENT
Start: 2021-09-28 | End: 2021-12-27

## 2021-09-28 RX ORDER — TRAMADOL HYDROCHLORIDE AND ACETAMINOPHEN 37.5; 325 MG/1; MG/1
1 TABLET ORAL
Qty: 20 TABLET | Refills: 0 | Status: SHIPPED | OUTPATIENT
Start: 2021-09-28 | End: 2021-10-03

## 2021-09-28 NOTE — PROGRESS NOTES
Chief Complaint   Patient presents with    Follow-up     Visit Vitals  /76 (BP 1 Location: Right arm, BP Patient Position: Sitting, BP Cuff Size: Adult)   Pulse 80   Temp 98.5 °F (36.9 °C) (Oral)   Resp 18   Ht 5' 11\" (1.803 m)   Wt 246 lb (111.6 kg)   SpO2 96%   BMI 34.31 kg/m²     1. Have you been to the ER, urgent care clinic since your last visit? Hospitalized since your last visit? No    2. Have you seen or consulted any other health care providers outside of the [address] [place] [address] since your last visit? Include any pap smears or colon screening.  No

## 2021-09-28 NOTE — PROGRESS NOTES
Thien Lynch is a 62 y.o. male and presents with Follow-up  Patient presents for follow-up. Patient notes chronic low back pain MRI performed in the past showed significant disc disease and he was told by his pain management physicians and Dr. Sterling Mehta physiatrist that he will likely need back surgery to help however for the time being he has been receiving steroid injections to the lumbar spine by Dr. Sage Jones pain management but each injection x2 was only given him relief for a day or so he is to follow-up in late October but says he cannot wait that long there is some component of radiculitis down the legs particularly left side I will try to obtain MRI imaging that was performed in the past I did contact Dr. Chelsea Aguilar office for an appointment early next week for further evaluation to see if surgical intervention may help and for the time being prescribed Ultracet he was aware that this is a controlled substance and we did perform urine toxicology screen he is overweight for height will try to lose some weight he does have some seasonal allergies helped in the past with loratadine and will refill medication we discussed last labs March of this year yielding white count of 5300 hemoglobin 13.7 platelet count 668,031 glucose 103 creatinine 0.99 sodium 144 calcium 8.5 AST 19 TSH 0.92 cholesterol 225  PSA normal at 0.6           Review of Systems  Constitutional: negative for fevers, chills, anorexia, weight loss and fatigue,no insomnia overweight for height  Eyes:   negative for visual disturbance and irritation, eye discharge, eye pain. no eye redness. ENT:   negative for tinnitus, sore throat, intermittent nasal congestion, ear pain, hoarseness, hearing loss.,no snoring.   Seasonal type allergies  Respiratory:  negative for cough, hemoptysis, shortness of breath, wheezing,  CV:   negative for chest pain, palpitations, lower extremity edema, shortness of breath while sitting, walking or at night  GI:   negative for nausea, vomiting, diarrhea, abdominal pain,melena,constipation. Endo:               negative for polyuria, polydipsia, polyphagia, cold or heat intolerance,hair loss. Genitourinary: negative for frequency, dysuria and hematuria,urethral discharge,nocturia.straining while urination,urinary incontinence. Integument:  negative for rash and pruritus  Hematologic:  negative for easy bruising and gum/nose bleeding, enlarged nodes  Musculoskel: negative for myalgias, arthralgias, noted back pain, muscle weakness, joint pain, h/o fall,cramps,calf pain. Neurological:  negative for headaches, dizziness, vertigo, memory problems, gait and seizures loss of consciousness,no ataxia. Some symptoms of lumbar radiculitis  Behavl/Psych: negative for feelings of anxiety, depression, mood changes ,sadness    Past Medical History:   Diagnosis Date    Gout      Past Surgical History:   Procedure Laterality Date    HX HERNIA REPAIR      twice    HX ORTHOPAEDIC      hand     Social History     Socioeconomic History    Marital status:      Spouse name: Not on file    Number of children: Not on file    Years of education: Not on file    Highest education level: Not on file   Tobacco Use    Smoking status: Current Some Day Smoker     Packs/day: 0.25     Years: 30.00     Pack years: 7.50     Types: Cigarettes    Smokeless tobacco: Never Used    Tobacco comment: pt states 3 times a week   Vaping Use    Vaping Use: Never used   Substance and Sexual Activity    Alcohol use: Yes     Comment: occasionally    Drug use: Never    Sexual activity: Yes     Partners: Female     Social Determinants of Health     Financial Resource Strain:     Difficulty of Paying Living Expenses:    Food Insecurity:     Worried About Running Out of Food in the Last Year:     Ran Out of Food in the Last Year:    Transportation Needs:     Lack of Transportation (Medical):      Lack of Transportation (Non-Medical):    Physical Activity:     Days of Exercise per Week:     Minutes of Exercise per Session:    Stress:     Feeling of Stress :    Social Connections:     Frequency of Communication with Friends and Family:     Frequency of Social Gatherings with Friends and Family:     Attends Christian Services:     Active Member of Clubs or Organizations:     Attends Club or Organization Meetings:     Marital Status:      Family History   Problem Relation Age of Onset    Cancer Mother     Diabetes Sister     Heart Disease Brother      Current Outpatient Medications   Medication Sig Dispense Refill    traMADol-acetaminophen (ULTRACET) 37.5-325 mg per tablet Take 1 Tablet by mouth every four (4) hours as needed for Pain (1 qid prn pain) for up to 5 days. Max Daily Amount: 6 Tablets. 20 Tablet 0    loratadine-pseudoephedrine (Loratadine-D)  mg per tablet Take 1 Tablet by mouth daily for 90 days. 30 Tablet 2    montelukast (SINGULAIR) 10 mg tablet take 1 tablet by mouth daily 90 Tablet 0    ondansetron (Zofran ODT) 4 mg disintegrating tablet Take 1 Tablet by mouth every eight (8) hours as needed for Nausea. 10 Tablet 0    ibuprofen (MOTRIN) 800 mg tablet Take 800 mg by mouth as needed.  gabapentin (NEURONTIN) 300 mg capsule Take 300 mg by mouth as needed.  baclofen (LIORESAL) 10 mg tablet Take 10 mg by mouth two (2) times daily as needed. No Known Allergies    Objective:  Visit Vitals  /76 (BP 1 Location: Right arm, BP Patient Position: Sitting, BP Cuff Size: Adult)   Pulse 80   Temp 98.5 °F (36.9 °C) (Oral)   Resp 18   Ht 5' 11\" (1.803 m)   Wt 246 lb (111.6 kg)   SpO2 96%   BMI 34.31 kg/m²       Physical Exam:   Constitutional: General Appearance: healthy-appearing and obese. Level of Distress: NAD. Ambulation: ambulating normally. Overweight for height  Psychiatric: Mental Status: normal mood and affect and active and alert. Orientation: to time, place, and person. no agitation. ,normal eye contact.   normal insight  Head: Head: normocephalic and atraumatic. Eyes: Pupils: PERRLA. Sclerae: non-icteric. ENMT: No lesions on external ear, no hearing loss. No lesions on external nose, sinus tenderness, or nasal discharge. Lips, Teeth, and no mouth or lip ulcers   Neck: Neck: supple, trachea midline, and no masses. Lymph Nodes: no cervical LAD. Thyroid: no enlargement or nodules and non-tender. Lungs: Respiratory effort: no dyspnea. Auscultation: no wheezing, rales/crackles, or rhonchi and breath sounds normal and good air movement  Cardiovascular: Apical Impulse: not displaced. Heart Auscultation: normal S1 and S2; no murmurs, rubs, or gallops; and RRR. Neck vessels: no carotid bruits. Pulses including femoral / pedal: normal throughout. Abdomen: Bowel Sounds: normal. Inspection and Palpation: no tenderness, guarding, or masses and soft and non-distended. Liver: non-tender and no hepatomegaly. Musculoskeletal[de-identified] Extremities: no edema or varicosities. Calf tenderness. Neurologic: Gait and Station: normal gait and station. Motor Strength normal right and left. Sensory and cerebellar intact. Skin: Inspection and palpation: no rash, lesions, or ulcer. Results for orders placed or performed during the hospital encounter of 08/23/21   COVID-19 RAPID TEST   Result Value Ref Range    Specimen source Nasopharyngeal      COVID-19 rapid test Not Detected Not Detected     SARS-COV-2   Result Value Ref Range    SARS-CoV-2 Please find results under separate order     GLUCOSE, POC   Result Value Ref Range    Glucose (POC) 109 65 - 117 mg/dL    Performed by Rajesh Mejia        Assessment/Plan:    ICD-10-CM ICD-9-CM    1. Chronic low back pain with sciatica, sciatica laterality unspecified, unspecified back pain laterality  M54.40 724.2 traMADol-acetaminophen (ULTRACET) 37.5-325 mg per tablet    G89.29 724.3 7-DRUG SCREEN UNBUND, UR     338.29    2. Encounter for drug therapy  Z79.899 V58.69 7-DRUG SCREEN UNBUND, UR   3.  Pure hypercholesterolemia  E78.00 272.0    4. Overweight  E66.3 278.02    5. Lumbar radiculitis  M54.16 724.4    6. Seasonal allergic rhinitis, unspecified trigger  J30.2 477.9      Orders Placed This Encounter    7-DRUG SCREEN UNBUND, UR    traMADol-acetaminophen (ULTRACET) 37.5-325 mg per tablet     Sig: Take 1 Tablet by mouth every four (4) hours as needed for Pain (1 qid prn pain) for up to 5 days. Max Daily Amount: 6 Tablets. Dispense:  20 Tablet     Refill:  0    loratadine-pseudoephedrine (Loratadine-D)  mg per tablet     Sig: Take 1 Tablet by mouth daily for 90 days. Dispense:  30 Tablet     Refill:  2       lose weight, increase physical activity, continue present plan, call if any problems    There are no Patient Instructions on file for this visit. Follow-up and Dispositions    · Return in about 6 months (around 3/28/2022).

## 2021-09-30 LAB
AMPHETAMINES UR QL SCN: NEGATIVE NG/ML
BARBITURATES UR QL SCN: NEGATIVE NG/ML
BENZODIAZ UR QL: NEGATIVE NG/ML
BZE UR QL: NEGATIVE NG/ML
CANNABINOIDS UR QL SCN: NEGATIVE NG/ML
OPIATES UR QL: NEGATIVE NG/ML
PCP UR QL: NEGATIVE NG/ML

## 2021-11-04 ENCOUNTER — HOSPITAL ENCOUNTER (OUTPATIENT)
Dept: PREADMISSION TESTING | Age: 61
Discharge: HOME | End: 2021-11-04
Payer: COMMERCIAL

## 2021-11-04 ENCOUNTER — HOSPITAL ENCOUNTER (OUTPATIENT)
Dept: GENERAL RADIOLOGY | Age: 61
Discharge: HOME | End: 2021-11-04
Attending: ORTHOPAEDIC SURGERY
Payer: COMMERCIAL

## 2021-11-04 VITALS
BODY MASS INDEX: 33.99 KG/M2 | OXYGEN SATURATION: 99 % | WEIGHT: 242.8 LBS | TEMPERATURE: 98.1 F | SYSTOLIC BLOOD PRESSURE: 119 MMHG | RESPIRATION RATE: 18 BRPM | HEART RATE: 75 BPM | HEIGHT: 71 IN | DIASTOLIC BLOOD PRESSURE: 76 MMHG

## 2021-11-04 LAB
ANION GAP SERPL CALC-SCNC: 7 MMOL/L (ref 5–15)
APPEARANCE UR: CLEAR
APTT PPP: 31.8 SEC (ref 21.2–34.1)
ATRIAL RATE: 70 BPM
BACTERIA URNS QL MICRO: NEGATIVE /HPF
BILIRUB UR QL: NEGATIVE
BUN SERPL-MCNC: 15 MG/DL (ref 6–20)
BUN/CREAT SERPL: 16 (ref 12–20)
CA-I BLD-MCNC: 8.7 MG/DL (ref 8.5–10.1)
CALCULATED P AXIS, ECG09: 60 DEGREES
CALCULATED R AXIS, ECG10: -2 DEGREES
CALCULATED T AXIS, ECG11: 18 DEGREES
CHLORIDE SERPL-SCNC: 113 MMOL/L (ref 97–108)
CO2 SERPL-SCNC: 23 MMOL/L (ref 21–32)
COLOR UR: NORMAL
CREAT SERPL-MCNC: 0.92 MG/DL (ref 0.7–1.3)
DIAGNOSIS, 93000: NORMAL
ERYTHROCYTE [DISTWIDTH] IN BLOOD BY AUTOMATED COUNT: 13.9 % (ref 11.5–14.5)
GLUCOSE SERPL-MCNC: 94 MG/DL (ref 65–100)
GLUCOSE UR STRIP.AUTO-MCNC: NEGATIVE MG/DL
HCT VFR BLD AUTO: 43.6 % (ref 36.6–50.3)
HGB BLD-MCNC: 14 G/DL (ref 12.1–17)
HGB UR QL STRIP: NEGATIVE
INR PPP: 1 (ref 0.9–1.1)
KETONES UR QL STRIP.AUTO: NEGATIVE MG/DL
LEUKOCYTE ESTERASE UR QL STRIP.AUTO: NEGATIVE
MCH RBC QN AUTO: 29 PG (ref 26–34)
MCHC RBC AUTO-ENTMCNC: 32.1 G/DL (ref 30–36.5)
MCV RBC AUTO: 90.5 FL (ref 80–99)
MRSA DNA SPEC QL NAA+PROBE: NOT DETECTED
MUCOUS THREADS URNS QL MICRO: NORMAL /LPF
NITRITE UR QL STRIP.AUTO: NEGATIVE
NRBC # BLD: 0 K/UL (ref 0–0.01)
NRBC BLD-RTO: 0 PER 100 WBC
P-R INTERVAL, ECG05: 170 MS
PH UR STRIP: 5 [PH] (ref 5–8)
PLATELET # BLD AUTO: 190 K/UL (ref 150–400)
PMV BLD AUTO: 11.3 FL (ref 8.9–12.9)
POTASSIUM SERPL-SCNC: 4 MMOL/L (ref 3.5–5.1)
PROT UR STRIP-MCNC: NEGATIVE MG/DL
PROTHROMBIN TIME: 12.7 SEC (ref 11.9–14.7)
Q-T INTERVAL, ECG07: 380 MS
QRS DURATION, ECG06: 74 MS
QTC CALCULATION (BEZET), ECG08: 410 MS
RBC # BLD AUTO: 4.82 M/UL (ref 4.1–5.7)
RBC #/AREA URNS HPF: NORMAL /HPF (ref 0–5)
SODIUM SERPL-SCNC: 143 MMOL/L (ref 136–145)
SP GR UR REFRACTOMETRY: 1.02 (ref 1–1.03)
THERAPEUTIC RANGE,PTTT: NORMAL SEC (ref 82–109)
UROBILINOGEN UR QL STRIP.AUTO: 0.1 EU/DL (ref 0.1–1)
VENTRICULAR RATE, ECG03: 70 BPM
WBC # BLD AUTO: 6.4 K/UL (ref 4.1–11.1)
WBC URNS QL MICRO: NORMAL /HPF (ref 0–4)

## 2021-11-04 PROCEDURE — 93005 ELECTROCARDIOGRAM TRACING: CPT

## 2021-11-04 PROCEDURE — 87641 MR-STAPH DNA AMP PROBE: CPT

## 2021-11-04 PROCEDURE — 87086 URINE CULTURE/COLONY COUNT: CPT

## 2021-11-04 PROCEDURE — 80048 BASIC METABOLIC PNL TOTAL CA: CPT

## 2021-11-04 PROCEDURE — 85610 PROTHROMBIN TIME: CPT

## 2021-11-04 PROCEDURE — 85027 COMPLETE CBC AUTOMATED: CPT

## 2021-11-04 PROCEDURE — 81001 URINALYSIS AUTO W/SCOPE: CPT

## 2021-11-04 PROCEDURE — 71046 X-RAY EXAM CHEST 2 VIEWS: CPT

## 2021-11-04 PROCEDURE — 85730 THROMBOPLASTIN TIME PARTIAL: CPT

## 2021-11-04 PROCEDURE — 36415 COLL VENOUS BLD VENIPUNCTURE: CPT

## 2021-11-04 NOTE — PROGRESS NOTES
56- Dr. Emily Bacon anesthesiologist made aware of patient's history and labs/testing to be done in [facility] today. No new orders received at this time, he stated to make him aware of patient's EKG if any abnormality found.

## 2021-11-04 NOTE — PROGRESS NOTES
26- Dr. Nolberto Ferreira anesthesiologist made aware of patient's EKG result from today 11/4/2021 no new orders received stated ok to proceed with surgery as scheduled.

## 2021-11-06 LAB
BACTERIA SPEC CULT: NORMAL
SPECIAL REQUESTS,SREQ: NORMAL

## 2021-11-12 ENCOUNTER — HOSPITAL ENCOUNTER (OUTPATIENT)
Dept: PREADMISSION TESTING | Age: 61
Discharge: HOME | End: 2021-11-12
Payer: COMMERCIAL

## 2021-11-12 LAB — SARS-COV-2, COV2: NORMAL

## 2021-11-12 PROCEDURE — U0005 INFEC AGEN DETEC AMPLI PROBE: HCPCS

## 2021-11-14 LAB
SARS-COV-2, XPLCVT: NOT DETECTED
SOURCE, COVRS: NORMAL

## 2021-11-16 ENCOUNTER — ANESTHESIA (OUTPATIENT)
Dept: SURGERY | Age: 61
End: 2021-11-16
Payer: COMMERCIAL

## 2021-11-16 ENCOUNTER — ANESTHESIA EVENT (OUTPATIENT)
Dept: SURGERY | Age: 61
End: 2021-11-16
Payer: COMMERCIAL

## 2021-11-16 ENCOUNTER — HOSPITAL ENCOUNTER (OUTPATIENT)
Age: 61
Discharge: HOME | End: 2021-11-16
Attending: ORTHOPAEDIC SURGERY | Admitting: ORTHOPAEDIC SURGERY
Payer: COMMERCIAL

## 2021-11-16 ENCOUNTER — APPOINTMENT (OUTPATIENT)
Dept: GENERAL RADIOLOGY | Age: 61
End: 2021-11-16
Attending: ORTHOPAEDIC SURGERY
Payer: COMMERCIAL

## 2021-11-16 VITALS
OXYGEN SATURATION: 97 % | SYSTOLIC BLOOD PRESSURE: 124 MMHG | HEART RATE: 78 BPM | TEMPERATURE: 97.4 F | RESPIRATION RATE: 16 BRPM | DIASTOLIC BLOOD PRESSURE: 74 MMHG

## 2021-11-16 DIAGNOSIS — M48.061 SPINAL STENOSIS OF LUMBAR REGION WITHOUT NEUROGENIC CLAUDICATION: Primary | ICD-10-CM

## 2021-11-16 PROBLEM — M48.00 SPINAL STENOSIS: Status: ACTIVE | Noted: 2021-11-16

## 2021-11-16 PROCEDURE — 76000 FLUOROSCOPY <1 HR PHYS/QHP: CPT

## 2021-11-16 PROCEDURE — 74011000250 HC RX REV CODE- 250: Performed by: ORTHOPAEDIC SURGERY

## 2021-11-16 PROCEDURE — 74011250636 HC RX REV CODE- 250/636: Performed by: NURSE ANESTHETIST, CERTIFIED REGISTERED

## 2021-11-16 PROCEDURE — 74011000250 HC RX REV CODE- 250: Performed by: NURSE ANESTHETIST, CERTIFIED REGISTERED

## 2021-11-16 PROCEDURE — 77030018673: Performed by: ORTHOPAEDIC SURGERY

## 2021-11-16 PROCEDURE — 77030011264 HC ELECTRD BLD EXT COVD -A: Performed by: ORTHOPAEDIC SURGERY

## 2021-11-16 PROCEDURE — 74011250637 HC RX REV CODE- 250/637: Performed by: ORTHOPAEDIC SURGERY

## 2021-11-16 PROCEDURE — 76210000021 HC REC RM PH II 0.5 TO 1 HR: Performed by: ORTHOPAEDIC SURGERY

## 2021-11-16 PROCEDURE — 74011000258 HC RX REV CODE- 258: Performed by: NURSE ANESTHETIST, CERTIFIED REGISTERED

## 2021-11-16 PROCEDURE — 74011250636 HC RX REV CODE- 250/636: Performed by: ORTHOPAEDIC SURGERY

## 2021-11-16 PROCEDURE — 77030040361 HC SLV COMPR DVT MDII -B: Performed by: ORTHOPAEDIC SURGERY

## 2021-11-16 PROCEDURE — 77030041237 HC BLNKT WARM MDII -A: Performed by: ORTHOPAEDIC SURGERY

## 2021-11-16 PROCEDURE — 76210000063 HC OR PH I REC FIRST 0.5 HR: Performed by: ORTHOPAEDIC SURGERY

## 2021-11-16 PROCEDURE — 77030034479 HC ADH SKN CLSR PRINEO J&J -B: Performed by: ORTHOPAEDIC SURGERY

## 2021-11-16 PROCEDURE — 77030038156 HC CRD BPLR DISP CARF -A: Performed by: ORTHOPAEDIC SURGERY

## 2021-11-16 PROCEDURE — 77030031139 HC SUT VCRL2 J&J -A: Performed by: ORTHOPAEDIC SURGERY

## 2021-11-16 PROCEDURE — 76010000162 HC OR TIME 1.5 TO 2 HR INTENSV-TIER 1: Performed by: ORTHOPAEDIC SURGERY

## 2021-11-16 PROCEDURE — 76060000034 HC ANESTHESIA 1.5 TO 2 HR: Performed by: ORTHOPAEDIC SURGERY

## 2021-11-16 PROCEDURE — 2709999900 HC NON-CHARGEABLE SUPPLY: Performed by: ORTHOPAEDIC SURGERY

## 2021-11-16 PROCEDURE — C1769 GUIDE WIRE: HCPCS | Performed by: ORTHOPAEDIC SURGERY

## 2021-11-16 RX ORDER — DIPHENHYDRAMINE HYDROCHLORIDE 50 MG/ML
12.5 INJECTION, SOLUTION INTRAMUSCULAR; INTRAVENOUS AS NEEDED
Status: DISCONTINUED | OUTPATIENT
Start: 2021-11-16 | End: 2021-11-16 | Stop reason: HOSPADM

## 2021-11-16 RX ORDER — BUPIVACAINE HYDROCHLORIDE 2.5 MG/ML
INJECTION, SOLUTION EPIDURAL; INFILTRATION; INTRACAUDAL AS NEEDED
Status: DISCONTINUED | OUTPATIENT
Start: 2021-11-16 | End: 2021-11-16 | Stop reason: HOSPADM

## 2021-11-16 RX ORDER — FENTANYL CITRATE 50 UG/ML
INJECTION, SOLUTION INTRAMUSCULAR; INTRAVENOUS AS NEEDED
Status: DISCONTINUED | OUTPATIENT
Start: 2021-11-16 | End: 2021-11-16 | Stop reason: HOSPADM

## 2021-11-16 RX ORDER — EPHEDRINE SULFATE/0.9% NACL/PF 50 MG/5 ML
SYRINGE (ML) INTRAVENOUS AS NEEDED
Status: DISCONTINUED | OUTPATIENT
Start: 2021-11-16 | End: 2021-11-16 | Stop reason: HOSPADM

## 2021-11-16 RX ORDER — SUCCINYLCHOLINE CHLORIDE 20 MG/ML
INJECTION INTRAMUSCULAR; INTRAVENOUS AS NEEDED
Status: DISCONTINUED | OUTPATIENT
Start: 2021-11-16 | End: 2021-11-16 | Stop reason: HOSPADM

## 2021-11-16 RX ORDER — HYDRALAZINE HYDROCHLORIDE 20 MG/ML
10 INJECTION INTRAMUSCULAR; INTRAVENOUS
Status: DISCONTINUED | OUTPATIENT
Start: 2021-11-16 | End: 2021-11-16 | Stop reason: HOSPADM

## 2021-11-16 RX ORDER — DEXAMETHASONE SODIUM PHOSPHATE 4 MG/ML
INJECTION, SOLUTION INTRA-ARTICULAR; INTRALESIONAL; INTRAMUSCULAR; INTRAVENOUS; SOFT TISSUE AS NEEDED
Status: DISCONTINUED | OUTPATIENT
Start: 2021-11-16 | End: 2021-11-16 | Stop reason: HOSPADM

## 2021-11-16 RX ORDER — HYDROMORPHONE HYDROCHLORIDE 1 MG/ML
0.4 INJECTION, SOLUTION INTRAMUSCULAR; INTRAVENOUS; SUBCUTANEOUS
Status: DISCONTINUED | OUTPATIENT
Start: 2021-11-16 | End: 2021-11-16 | Stop reason: HOSPADM

## 2021-11-16 RX ORDER — LIDOCAINE HYDROCHLORIDE 10 MG/ML
0.1 INJECTION, SOLUTION EPIDURAL; INFILTRATION; INTRACAUDAL; PERINEURAL AS NEEDED
Status: DISCONTINUED | OUTPATIENT
Start: 2021-11-16 | End: 2021-11-16 | Stop reason: HOSPADM

## 2021-11-16 RX ORDER — MIDAZOLAM HYDROCHLORIDE 1 MG/ML
1 INJECTION, SOLUTION INTRAMUSCULAR; INTRAVENOUS AS NEEDED
Status: DISCONTINUED | OUTPATIENT
Start: 2021-11-16 | End: 2021-11-16 | Stop reason: HOSPADM

## 2021-11-16 RX ORDER — HYDROCODONE BITARTRATE AND ACETAMINOPHEN 5; 325 MG/1; MG/1
1 TABLET ORAL AS NEEDED
Status: DISCONTINUED | OUTPATIENT
Start: 2021-11-16 | End: 2021-11-16 | Stop reason: HOSPADM

## 2021-11-16 RX ORDER — ROCURONIUM BROMIDE 10 MG/ML
INJECTION, SOLUTION INTRAVENOUS AS NEEDED
Status: DISCONTINUED | OUTPATIENT
Start: 2021-11-16 | End: 2021-11-16 | Stop reason: HOSPADM

## 2021-11-16 RX ORDER — ACETAMINOPHEN 500 MG
1000 TABLET ORAL ONCE
Status: COMPLETED | OUTPATIENT
Start: 2021-11-16 | End: 2021-11-16

## 2021-11-16 RX ORDER — MIDAZOLAM HYDROCHLORIDE 1 MG/ML
0.5 INJECTION, SOLUTION INTRAMUSCULAR; INTRAVENOUS
Status: DISCONTINUED | OUTPATIENT
Start: 2021-11-16 | End: 2021-11-16 | Stop reason: HOSPADM

## 2021-11-16 RX ORDER — NORETHINDRONE AND ETHINYL ESTRADIOL 0.5-0.035
5 KIT ORAL AS NEEDED
Status: DISCONTINUED | OUTPATIENT
Start: 2021-11-16 | End: 2021-11-16 | Stop reason: HOSPADM

## 2021-11-16 RX ORDER — ONDANSETRON 2 MG/ML
INJECTION INTRAMUSCULAR; INTRAVENOUS AS NEEDED
Status: DISCONTINUED | OUTPATIENT
Start: 2021-11-16 | End: 2021-11-16 | Stop reason: HOSPADM

## 2021-11-16 RX ORDER — LABETALOL HCL 20 MG/4 ML
5 SYRINGE (ML) INTRAVENOUS
Status: DISCONTINUED | OUTPATIENT
Start: 2021-11-16 | End: 2021-11-16 | Stop reason: HOSPADM

## 2021-11-16 RX ORDER — SODIUM CHLORIDE 0.9 % (FLUSH) 0.9 %
5-40 SYRINGE (ML) INJECTION AS NEEDED
Status: DISCONTINUED | OUTPATIENT
Start: 2021-11-16 | End: 2021-11-16 | Stop reason: HOSPADM

## 2021-11-16 RX ORDER — SODIUM CHLORIDE 0.9 % (FLUSH) 0.9 %
5-40 SYRINGE (ML) INJECTION EVERY 8 HOURS
Status: DISCONTINUED | OUTPATIENT
Start: 2021-11-16 | End: 2021-11-16 | Stop reason: HOSPADM

## 2021-11-16 RX ORDER — METOPROLOL TARTRATE 5 MG/5ML
2.5 INJECTION INTRAVENOUS
Status: DISCONTINUED | OUTPATIENT
Start: 2021-11-16 | End: 2021-11-16 | Stop reason: HOSPADM

## 2021-11-16 RX ORDER — ONDANSETRON 2 MG/ML
4 INJECTION INTRAMUSCULAR; INTRAVENOUS AS NEEDED
Status: DISCONTINUED | OUTPATIENT
Start: 2021-11-16 | End: 2021-11-16 | Stop reason: HOSPADM

## 2021-11-16 RX ORDER — GABAPENTIN 300 MG/1
300 CAPSULE ORAL ONCE
Status: COMPLETED | OUTPATIENT
Start: 2021-11-16 | End: 2021-11-16

## 2021-11-16 RX ORDER — MIDAZOLAM HYDROCHLORIDE 1 MG/ML
INJECTION, SOLUTION INTRAMUSCULAR; INTRAVENOUS AS NEEDED
Status: DISCONTINUED | OUTPATIENT
Start: 2021-11-16 | End: 2021-11-16 | Stop reason: HOSPADM

## 2021-11-16 RX ORDER — SODIUM CHLORIDE, SODIUM LACTATE, POTASSIUM CHLORIDE, CALCIUM CHLORIDE 600; 310; 30; 20 MG/100ML; MG/100ML; MG/100ML; MG/100ML
20 INJECTION, SOLUTION INTRAVENOUS CONTINUOUS
Status: DISCONTINUED | OUTPATIENT
Start: 2021-11-16 | End: 2021-11-16 | Stop reason: HOSPADM

## 2021-11-16 RX ORDER — FENTANYL CITRATE 50 UG/ML
50 INJECTION, SOLUTION INTRAMUSCULAR; INTRAVENOUS AS NEEDED
Status: DISCONTINUED | OUTPATIENT
Start: 2021-11-16 | End: 2021-11-16 | Stop reason: HOSPADM

## 2021-11-16 RX ORDER — LIDOCAINE HYDROCHLORIDE 20 MG/ML
INJECTION, SOLUTION EPIDURAL; INFILTRATION; INTRACAUDAL; PERINEURAL AS NEEDED
Status: DISCONTINUED | OUTPATIENT
Start: 2021-11-16 | End: 2021-11-16 | Stop reason: HOSPADM

## 2021-11-16 RX ORDER — FENTANYL CITRATE 50 UG/ML
50 INJECTION, SOLUTION INTRAMUSCULAR; INTRAVENOUS
Status: DISCONTINUED | OUTPATIENT
Start: 2021-11-16 | End: 2021-11-16 | Stop reason: HOSPADM

## 2021-11-16 RX ORDER — PROPOFOL 10 MG/ML
INJECTION, EMULSION INTRAVENOUS AS NEEDED
Status: DISCONTINUED | OUTPATIENT
Start: 2021-11-16 | End: 2021-11-16 | Stop reason: HOSPADM

## 2021-11-16 RX ORDER — HYDROCODONE BITARTRATE AND ACETAMINOPHEN 5; 325 MG/1; MG/1
1 TABLET ORAL
Qty: 28 TABLET | Refills: 0 | Status: SHIPPED | OUTPATIENT
Start: 2021-11-16 | End: 2021-11-23

## 2021-11-16 RX ADMIN — LIDOCAINE HYDROCHLORIDE 80 MG: 20 INJECTION, SOLUTION EPIDURAL; INFILTRATION; INTRACAUDAL; PERINEURAL at 07:40

## 2021-11-16 RX ADMIN — SODIUM CHLORIDE, POTASSIUM CHLORIDE, SODIUM LACTATE AND CALCIUM CHLORIDE 20 ML/HR: 600; 310; 30; 20 INJECTION, SOLUTION INTRAVENOUS at 06:59

## 2021-11-16 RX ADMIN — FENTANYL CITRATE 25 MCG: 0.05 INJECTION, SOLUTION INTRAMUSCULAR; INTRAVENOUS at 08:55

## 2021-11-16 RX ADMIN — DEXAMETHASONE SODIUM PHOSPHATE 4 MG: 4 INJECTION, SOLUTION INTRA-ARTICULAR; INTRALESIONAL; INTRAMUSCULAR; INTRAVENOUS; SOFT TISSUE at 07:40

## 2021-11-16 RX ADMIN — PHENYLEPHRINE HYDROCHLORIDE 50 MCG: 10 INJECTION INTRAVENOUS at 08:48

## 2021-11-16 RX ADMIN — ACETAMINOPHEN 1000 MG: 500 TABLET ORAL at 07:00

## 2021-11-16 RX ADMIN — SUGAMMADEX 200 MG: 100 INJECTION, SOLUTION INTRAVENOUS at 09:20

## 2021-11-16 RX ADMIN — PHENYLEPHRINE HYDROCHLORIDE 50 MCG: 10 INJECTION INTRAVENOUS at 08:24

## 2021-11-16 RX ADMIN — ONDANSETRON 4 MG: 2 INJECTION INTRAMUSCULAR; INTRAVENOUS at 09:10

## 2021-11-16 RX ADMIN — GABAPENTIN 300 MG: 300 CAPSULE ORAL at 07:00

## 2021-11-16 RX ADMIN — ROCURONIUM BROMIDE 5 MG: 50 INJECTION, SOLUTION INTRAVENOUS at 07:40

## 2021-11-16 RX ADMIN — Medication 5 MG: at 08:15

## 2021-11-16 RX ADMIN — PHENYLEPHRINE HYDROCHLORIDE 50 MCG: 10 INJECTION INTRAVENOUS at 08:16

## 2021-11-16 RX ADMIN — PROPOFOL 200 MG: 10 INJECTION, EMULSION INTRAVENOUS at 07:40

## 2021-11-16 RX ADMIN — PHENYLEPHRINE HYDROCHLORIDE 50 MCG: 10 INJECTION INTRAVENOUS at 08:36

## 2021-11-16 RX ADMIN — FENTANYL CITRATE 25 MCG: 0.05 INJECTION, SOLUTION INTRAMUSCULAR; INTRAVENOUS at 09:15

## 2021-11-16 RX ADMIN — SUCCINYLCHOLINE CHLORIDE 200 MG: 20 INJECTION, SOLUTION INTRAMUSCULAR; INTRAVENOUS at 07:40

## 2021-11-16 RX ADMIN — PHENYLEPHRINE HYDROCHLORIDE 50 MCG: 10 INJECTION INTRAVENOUS at 08:40

## 2021-11-16 RX ADMIN — PHENYLEPHRINE HYDROCHLORIDE 50 MCG: 10 INJECTION INTRAVENOUS at 08:30

## 2021-11-16 RX ADMIN — CEFAZOLIN SODIUM 2 G: 1 INJECTION, POWDER, FOR SOLUTION INTRAMUSCULAR; INTRAVENOUS at 07:53

## 2021-11-16 RX ADMIN — FENTANYL CITRATE 50 MCG: 0.05 INJECTION, SOLUTION INTRAMUSCULAR; INTRAVENOUS at 07:39

## 2021-11-16 RX ADMIN — Medication 10 MG: at 08:05

## 2021-11-16 RX ADMIN — MIDAZOLAM HYDROCHLORIDE 2 MG: 2 INJECTION, SOLUTION INTRAMUSCULAR; INTRAVENOUS at 07:35

## 2021-11-16 NOTE — ANESTHESIA POSTPROCEDURE EVALUATION
Procedure(s):  LEFT APPROACH TUBULAR BILATERAL L4-5 LAMINOFORAMINOTOMY.     general    Anesthesia Post Evaluation      Multimodal analgesia: multimodal analgesia not used between 6 hours prior to anesthesia start to PACU discharge  Patient location during evaluation: PACU  Patient participation: complete - patient participated  Level of consciousness: awake  Pain score: 1  Pain management: adequate  Airway patency: patent  Anesthetic complications: no  Cardiovascular status: acceptable, blood pressure returned to baseline and hemodynamically stable  Respiratory status: acceptable, nonlabored ventilation, spontaneous ventilation, unassisted and nasal cannula  Hydration status: acceptable  Post anesthesia nausea and vomiting:  none  Final Post Anesthesia Temperature Assessment:  Normothermia (36.0-37.5 degrees C)      INITIAL Post-op Vital signs:   Vitals Value Taken Time   /72 11/16/21 0932   Temp 36.3 °C (97.3 °F) 11/16/21 0932   Pulse 89 11/16/21 0932   Resp 14 11/16/21 0932   SpO2 100 % 11/16/21 0932

## 2021-11-16 NOTE — ANESTHESIA PREPROCEDURE EVALUATION
Relevant Problems   No relevant active problems       Anesthetic History   No history of anesthetic complications            Review of Systems / Medical History  Patient summary reviewed, nursing notes reviewed and pertinent labs reviewed    Pulmonary  Within defined limits                 Neuro/Psych         Psychiatric history    Comments: Spinal stenosis  Lumbar radiculopathy Cardiovascular              Hyperlipidemia         GI/Hepatic/Renal  Within defined limits              Endo/Other        Arthritis     Other Findings          Past Medical History:   Diagnosis Date    Arthritis     Chronic pain     back    Gout     Hyperlipidemia     Patient stated he was on medication     Lumbar radiculopathy     Psychiatric disorder     depression     Spinal stenosis        Past Surgical History:   Procedure Laterality Date    HX COLONOSCOPY      HX HERNIA REPAIR      twice    HX ORTHOPAEDIC      right hand trigger finger release        Current Outpatient Medications   Medication Instructions    baclofen (LIORESAL) 10 mg, Oral, 2 TIMES DAILY AS NEEDED    gabapentin (NEURONTIN) 300 mg, Oral, 3 TIMES DAILY    ibuprofen (MOTRIN) 800 mg, Oral, AS NEEDED    loratadine-pseudoephedrine (Loratadine-D)  mg per tablet 1 Tablet, Oral, DAILY    montelukast (SINGULAIR) 10 mg tablet take 1 tablet by mouth daily    ondansetron (ZOFRAN ODT) 4 mg, Oral, EVERY 8 HOURS AS NEEDED       Current Facility-Administered Medications   Medication Dose Route Frequency    lactated Ringers infusion  20 mL/hr IntraVENous CONTINUOUS       Patient Vitals for the past 24 hrs:   Temp Pulse Resp BP SpO2   11/16/21 0642 36.7 °C (98.1 °F) 70 16 (!) 144/79 98 %       Lab Results   Component Value Date/Time    WBC 6.4 11/04/2021 02:56 PM    HGB 14.0 11/04/2021 02:56 PM    HCT 43.6 11/04/2021 02:56 PM    PLATELET 883 95/26/1551 02:56 PM    MCV 90.5 11/04/2021 02:56 PM     Lab Results   Component Value Date/Time    Sodium 143 11/04/2021 02:56 PM    Potassium 4.0 11/04/2021 02:56 PM    Chloride 113 (H) 11/04/2021 02:56 PM    CO2 23 11/04/2021 02:56 PM    Anion gap 7 11/04/2021 02:56 PM    Glucose 94 11/04/2021 02:56 PM    BUN 15 11/04/2021 02:56 PM    Creatinine 0.92 11/04/2021 02:56 PM    BUN/Creatinine ratio 16 11/04/2021 02:56 PM    GFR est AA >60 11/04/2021 02:56 PM    GFR est non-AA >60 11/04/2021 02:56 PM    Calcium 8.7 11/04/2021 02:56 PM     No results found for: APTT, PTP, INR, INREXT  Lab Results   Component Value Date/Time    Glucose 94 11/04/2021 02:56 PM    Glucose (POC) 109 08/23/2021 02:38 PM     Physical Exam    Airway  Mallampati: I  TM Distance: 4 - 6 cm  Neck ROM: normal range of motion   Mouth opening: Normal     Cardiovascular    Rhythm: regular  Rate: normal         Dental    Dentition: Poor dentition     Pulmonary  Breath sounds clear to auscultation               Abdominal  GI exam deferred       Other Findings   Comments: Results for Alberto Galan (MRN [id number]) as of 11/16/2021 07:23    11/4/2021 14:56  WBC: 6.4  NRBC: 0.0  RBC: 4.82  HGB: 14.0  HCT: 43.6  MCV: 90.5  MCH: 29.0  MCHC: 32.1  RDW: 13.9  PLATELET: 156  MPV: 77.4    Results for Alberto Galan (MRN [id number]) as of 11/16/2021 07:23    11/4/2021 14:56  INR: 1.0  Prothrombin time: 12.7  aPTT: 31.8    Results for Alberto Galan (MRN [id number]) as of 11/16/2021 07:23    11/4/2021 14:56  Sodium: 143  Potassium: 4.0  Chloride: 113 (H)  CO2: 23  Anion gap: 7  Glucose: 94  BUN: 15  Creatinine: 0.92  BUN/Creatinine ratio: 16  Calcium: 8.7  GFR est non-AA: >60  GFR est AA: >60         Anesthetic Plan    ASA: 3  Anesthesia type: general          Induction: Intravenous  Anesthetic plan and risks discussed with: Patient and Family

## 2021-11-16 NOTE — OP NOTES
Facial INDICATIONS FOR PROCEDURE: Mr. Deacon Sandoval is a 71-year-old man with bilateral lumbar radiculopathy, left worse than right, that has failed conservative treatments including activity modification, physical therapy, medical management of pain and epidural steroid injections. The benefits and risks of operative intervention, for decompression of the bilateral L5 roots with laminotomy and bilateral L4 roots with foraminotomy using a minimally invasive tubular approach, were discussed with the patient at length. After a thorough discussion of the attendant benefits and risks of surgical intervention the patient consented freely to the procedure. PREOPERATIVE DIAGNOSIS  Bilateral L4-5 stenosis with radiculopathy    POSTOPERATIVE DIAGNOSIS  Bilateral L4-5 stenosis with radiculopathy    PROCEDURE PERFORMED  Left and right L4-5 laminoforaminotomies with partial facetectomies    SURGEON  Oswaldo Ewing MD    ASSISTANT  Grisel Wilson PA-C    ESTIMATED BLOOD LOSS  50 cc    COMPLICATIONS  None    DISPOSITION  Stable to recovery room    DETAILS OF OPERATIVE INTERVENTION: The patient was identified in the holding area and the operative site was marked. He was taken to the OR and placed in the prone position on the [facility] table after intubation. All bony prominences were adequately padded. His back was prepped and draped in the usual sterile fashion. The appropriate time out procedure was called and carried out. The patient received Ancef 2 g IV within 1 hour before incision. The left paramedian incision was centered at the left L4-5 interlaminar space using fluoroscopic visualization. The incision was taken through skin and subcutaneous tissue with a knife. A guide wire was placed at the inferior edge of the left L4 lamina. Sequential dilation with the METRx system was then carried out to dock the appropriate length tubular retractor at the interlaminar space.  The microscope was then brought in and laminectomy was carried out with a 4 mm Kerrison punch after thinning the lamina and medial edge of the L4 inferior articular process with a bur. The ligamentum flavum was released circumferentially and removed. The superior articular process of L5 was then undercut to decompress the neuroforamen and the L4 root. The medial edge of the facet joint was carefully trimmed into the lateral recess to completely decompress the L5 root. Bipolar electrocautery was used for hemostasis. After establishing hemostasis the METRx tube was directed medially and the bed was rotated with the right side down to allow visualization of the midline and right side of the spinal canal.    The midline bone was burred to expose the dorsal surface of the ligamentum flavum on the right side. The burring was then extended out to the facet joint on the right side. The entire ligamentum flavum was exposed in this manner. Using angled curettes and 3 mm Kerrison, the right side ligamentum flavum at L4-5 was removed. The superior articular process of L5 was visualized and undercut into the lateral recess portion and into the foramen using a 3 mm Kerrison. Angled curettes and right angled dissector as well as a long ball probe were used to confirm adequacy of decompression of both the L4 and L5 roots. Bone wax was applied on the bleeding laminar bone age to obtain hemostasis. The epidural veins were also cauterized with bipolar electrocautery for hemostasis. After establishing hemostasis the METRx tube was withdrawn and the wound was closed with 2-0 Vicryl for subcutaneous tissue closure followed by Dermabond Prineo for skin closure. My PA assisted with suction, collection of removed bone and soft tissue, and root retraction during decompression. He closed the subcutaneous tissue and skin and applied the dressing on the wound. He also assisted with patient turning to supine position.     The patient was then turned to the supine position and extubated without incident. He was transported to recovery room in stable condition.

## 2022-01-31 ENCOUNTER — OFFICE VISIT (OUTPATIENT)
Dept: INTERNAL MEDICINE CLINIC | Age: 62
End: 2022-01-31
Payer: COMMERCIAL

## 2022-01-31 VITALS
TEMPERATURE: 98 F | RESPIRATION RATE: 19 BRPM | OXYGEN SATURATION: 97 % | DIASTOLIC BLOOD PRESSURE: 88 MMHG | HEART RATE: 72 BPM | WEIGHT: 249.9 LBS | HEIGHT: 71 IN | SYSTOLIC BLOOD PRESSURE: 142 MMHG | BODY MASS INDEX: 34.98 KG/M2

## 2022-01-31 DIAGNOSIS — E66.3 OVERWEIGHT: ICD-10-CM

## 2022-01-31 DIAGNOSIS — Z79.899 ENCOUNTER FOR LONG-TERM (CURRENT) USE OF OTHER MEDICATIONS: Primary | ICD-10-CM

## 2022-01-31 DIAGNOSIS — M48.07 SPINAL STENOSIS OF LUMBOSACRAL REGION: ICD-10-CM

## 2022-01-31 PROCEDURE — 99214 OFFICE O/P EST MOD 30 MIN: CPT | Performed by: INTERNAL MEDICINE

## 2022-01-31 NOTE — PROGRESS NOTES
1. Have you been to the ER, urgent care clinic since your last visit? Hospitalized since your last visit? No    2. Have you seen or consulted any other health care providers outside of the [address] [place] [address] since your last visit? Include any pap smears or colon screening.  No     Chief Complaint   Patient presents with    Medication Refill       Visit Vitals  BP (!) 142/88 (BP 1 Location: Left upper arm, BP Patient Position: Sitting, BP Cuff Size: Adult)   Pulse 72   Temp 98 °F (36.7 °C) (Oral)   Resp 19   Ht 5' 11\" (1.803 m)   Wt 249 lb 14.4 oz (113.4 kg)   SpO2 97%   BMI 34.85 kg/m²

## 2022-01-31 NOTE — PROGRESS NOTES
Sreedhar Reyez is a 62 y.o. male and presents with Medication Refill  Patient presents for follow-up very pleasant had orthopedic surgery laminectomy lumbar spine last November he has done somewhat better in regards to resolution of discomfort pain right side but still has significant discomfort or pain left lumbar area in anterior left thigh bothers him more so lying in the bed and moving from side to side and trying to get out of bed as well as well as getting from a seated position standing he is to follow-up this Thursday with Dr. Gary Combs the patient wanted something for discomfort of pain I advised him to wait and see what Dr. Carlos Hutchinson wanted he had given him 20 pills of hydrocodone right after the surgery but will patient says he will not give him any more pain medication. He was agreeable for obtaining urine toxicology screen in anticipation that I might have to tide him over with low-dose Ultracet for a few days if he cannot get to Dr. Gary Combs. I wait and see what Dr. Abilio Moctezuma says he has had pain management by way of Dr. Jessica Guillen in regards to steroid injections which did not help at all he is overweight for height he will try to lose some weight we discussed last labs and current medications he has been on Neurontin 300 mg at night which does not usually help and baclofen for antispasmodic 10 mg twice a day which she says does help sometimes in regards to back pain           Review of Systems  Constitutional: negative for fevers, chills, anorexia, weight loss and fatigue,no insomnia overweight for height eyes:   negative for visual disturbance and irritation, eye discharge, eye pain. no eye redness. ENT:   negative for tinnitus, sore throat, nasal congestion, ear pain, hoarseness, hearing loss.,no snoring.   Respiratory:  negative for cough, hemoptysis, shortness of breath, wheezing,  CV:   negative for chest pain, palpitations, lower extremity edema, shortness of breath while sitting, walking or at night  GI:   negative for nausea, vomiting, diarrhea, abdominal pain,melena,constipation. Endo:               negative for polyuria, polydipsia, polyphagia, cold or heat intolerance,hair loss. Genitourinary: negative for frequency, dysuria and hematuria,urethral discharge,nocturia.straining while urination,urinary incontinence. Integument:  negative for rash and pruritus  Hematologic:  negative for easy bruising and gum/nose bleeding, enlarged nodes  Musculoskel: negative for myalgias, arthralgias, chronic back pain, muscle weakness, joint pain, h/o fall,cramps,calf pain. DJD neurological:  negative for headaches, dizziness, vertigo, memory problems, gait and seizures loss of consciousness,no ataxia.   Behavl/Psych: negative for feelings of anxiety, depression, mood changes ,sadness    Past Medical History:   Diagnosis Date    Arthritis     Chronic pain     back    Gout     Hyperlipidemia     Patient stated he was on medication     Lumbar radiculopathy     Psychiatric disorder     depression     Spinal stenosis      Past Surgical History:   Procedure Laterality Date    HX COLONOSCOPY      HX HERNIA REPAIR      twice    HX ORTHOPAEDIC      right hand trigger finger release      Social History     Socioeconomic History    Marital status:    Tobacco Use    Smoking status: Current Some Day Smoker     Packs/day: 0.25     Years: 30.00     Pack years: 7.50     Types: Cigarettes    Smokeless tobacco: Never Used    Tobacco comment: pt states 3 times a week   Vaping Use    Vaping Use: Never used   Substance and Sexual Activity    Alcohol use: Yes     Comment: occasionally    Drug use: Never    Sexual activity: Yes     Partners: Female     Family History   Problem Relation Age of Onset    Cancer Mother     Diabetes Sister     Heart Disease Brother     Alcohol abuse Father      Current Outpatient Medications   Medication Sig Dispense Refill    montelukast (SINGULAIR) 10 mg tablet take 1 tablet by mouth daily 90 Tablet 0    ibuprofen (MOTRIN) 800 mg tablet Take 800 mg by mouth as needed.  gabapentin (NEURONTIN) 300 mg capsule Take 300 mg by mouth three (3) times daily.  baclofen (LIORESAL) 10 mg tablet Take 10 mg by mouth two (2) times daily as needed.  ondansetron (Zofran ODT) 4 mg disintegrating tablet Take 1 Tablet by mouth every eight (8) hours as needed for Nausea. (Patient not taking: Reported on 11/4/2021) 10 Tablet 0     Allergies   Allergen Reactions    Fish Containing Products Nausea and Vomiting     Patient stated baked fish only patient stated can eat grilled fish         Objective:  Visit Vitals  BP (!) 142/88 (BP 1 Location: Left upper arm, BP Patient Position: Sitting, BP Cuff Size: Adult)   Pulse 72   Temp 98 °F (36.7 °C) (Oral)   Resp 19   Ht 5' 11\" (1.803 m)   Wt 249 lb 14.4 oz (113.4 kg)   SpO2 97%   BMI 34.85 kg/m²       Physical Exam:   Constitutional: General Appearance: healthy-appearing and obese. Level of Distress: NAD. Ambulation: ambulating normally. Overweight for height  Psychiatric: Mental Status: normal mood and affect and active and alert. Orientation: to time, place, and person. no agitation. ,normal eye contact. normal insight  Head: Head: normocephalic and atraumatic. Eyes: Pupils: PERRLA. Sclerae: non-icteric. ENMT: No lesions on external ear, no hearing loss. No lesions on external nose, sinus tenderness, or nasal discharge. Lips, Teeth, and no mouth or lip ulcers   Neck: Neck: supple, trachea midline, and no masses. Lymph Nodes: no cervical LAD. Thyroid: no enlargement or nodules and non-tender. Lungs: Respiratory effort: no dyspnea. Auscultation: no wheezing, rales/crackles, or rhonchi and breath sounds normal and good air movement. Cardiovascular: Apical Impulse: not displaced. Heart Auscultation: normal S1 and S2; no murmurs, rubs, or gallops; and RRR. Neck vessels: no carotid bruits. Pulses including femoral / pedal: normal throughout. Abdomen: Bowel Sounds: normal. Inspection and Palpation: no tenderness, guarding, or masses and soft and non-distended. Liver: non-tender and no hepatomegaly. Musculoskeletal[de-identified] Extremities: no edema or varicosities. Calf tenderness. DJD  Neurologic: Gait and Station: normal gait and station. Motor Strength normal right and left. Sensory and cerebellar intact. Skin: Inspection and palpation: no rash, lesions, or ulcer. Results for orders placed or performed during the hospital encounter of 11/12/21   SARS-COV-2   Result Value Ref Range    SARS-CoV-2 Please find results under separate order     SARS-COV-2   Result Value Ref Range    Specimen source Nasopharyngeal      SARS-CoV-2 Not detected Not detected       Assessment/Plan:    ICD-10-CM ICD-9-CM    1. Encounter for long-term (current) use of other medications  Z79.899 V58.69 7-DRUG SCREEN UNBUND, UR   2. Spinal stenosis of lumbosacral region  M48.07 724.02    3. Overweight  E66.3 278.02      Orders Placed This Encounter    7-DRUG SCREEN UNBUND, UR       lose weight, continue present plan, call if any problems    There are no Patient Instructions on file for this visit. Follow-up and Dispositions    · Return in about 3 months (around 4/30/2022).

## 2022-02-02 ENCOUNTER — TELEPHONE (OUTPATIENT)
Dept: INTERNAL MEDICINE CLINIC | Age: 62
End: 2022-02-02

## 2022-02-02 NOTE — TELEPHONE ENCOUNTER
Fina Peres called to check on the status of urine drug screen and see if a prescription will now be called in.

## 2022-03-19 PROBLEM — M48.00 SPINAL STENOSIS: Status: ACTIVE | Noted: 2021-11-16

## 2022-04-05 PROBLEM — E78.5 DYSLIPIDEMIA: Status: ACTIVE | Noted: 2022-04-05

## 2022-04-05 PROBLEM — Z72.0 CURRENT OCCASIONAL SMOKER: Status: ACTIVE | Noted: 2022-04-05

## 2022-04-05 PROBLEM — M47.816 SPONDYLOSIS OF LUMBAR REGION WITHOUT MYELOPATHY OR RADICULOPATHY: Status: ACTIVE | Noted: 2022-04-05

## 2022-04-05 PROBLEM — E66.01 MORBID OBESITY (HCC): Status: ACTIVE | Noted: 2022-04-05

## 2022-04-05 PROBLEM — Z11.59 NEED FOR HEPATITIS C SCREENING TEST: Status: ACTIVE | Noted: 2022-04-05

## 2022-04-05 PROBLEM — Z98.890 HISTORY OF LUMBOSACRAL SPINE SURGERY: Status: ACTIVE | Noted: 2022-04-05

## 2022-04-05 PROBLEM — M47.26 OSTEOARTHRITIS OF SPINE WITH RADICULOPATHY, LUMBAR REGION: Status: ACTIVE | Noted: 2022-04-05

## 2022-04-05 PROBLEM — Z12.11 SCREENING FOR COLON CANCER: Status: ACTIVE | Noted: 2022-04-05

## 2022-04-06 ENCOUNTER — OFFICE VISIT (OUTPATIENT)
Dept: INTERNAL MEDICINE CLINIC | Age: 62
End: 2022-04-06
Payer: COMMERCIAL

## 2022-04-06 VITALS
OXYGEN SATURATION: 97 % | RESPIRATION RATE: 18 BRPM | DIASTOLIC BLOOD PRESSURE: 65 MMHG | WEIGHT: 249.6 LBS | SYSTOLIC BLOOD PRESSURE: 118 MMHG | BODY MASS INDEX: 35.73 KG/M2 | HEART RATE: 83 BPM | TEMPERATURE: 97.3 F | HEIGHT: 70 IN

## 2022-04-06 DIAGNOSIS — J30.9 ALLERGIC RHINITIS, UNSPECIFIED SEASONALITY, UNSPECIFIED TRIGGER: ICD-10-CM

## 2022-04-06 DIAGNOSIS — E55.9 VITAMIN D DEFICIENCY: ICD-10-CM

## 2022-04-06 DIAGNOSIS — E78.5 DYSLIPIDEMIA: ICD-10-CM

## 2022-04-06 DIAGNOSIS — Z12.11 SCREENING FOR COLON CANCER: ICD-10-CM

## 2022-04-06 DIAGNOSIS — F17.218 CIGARETTE NICOTINE DEPENDENCE WITH OTHER NICOTINE-INDUCED DISORDER: ICD-10-CM

## 2022-04-06 DIAGNOSIS — Z11.59 NEED FOR HEPATITIS C SCREENING TEST: ICD-10-CM

## 2022-04-06 DIAGNOSIS — M48.07 SPINAL STENOSIS OF LUMBOSACRAL REGION: ICD-10-CM

## 2022-04-06 DIAGNOSIS — J45.20 MILD INTERMITTENT ASTHMA WITHOUT COMPLICATION: ICD-10-CM

## 2022-04-06 DIAGNOSIS — M47.26 OSTEOARTHRITIS OF SPINE WITH RADICULOPATHY, LUMBAR REGION: ICD-10-CM

## 2022-04-06 DIAGNOSIS — E66.01 MORBID OBESITY (HCC): ICD-10-CM

## 2022-04-06 DIAGNOSIS — Z98.890 HISTORY OF LUMBOSACRAL SPINE SURGERY: ICD-10-CM

## 2022-04-06 PROCEDURE — 99214 OFFICE O/P EST MOD 30 MIN: CPT | Performed by: INTERNAL MEDICINE

## 2022-04-06 RX ORDER — IBUPROFEN 200 MG
1 TABLET ORAL EVERY 24 HOURS
Qty: 30 PATCH | Refills: 2 | Status: SHIPPED | OUTPATIENT
Start: 2022-04-06 | End: 2022-05-06

## 2022-04-06 RX ORDER — MONTELUKAST SODIUM 10 MG/1
10 TABLET ORAL DAILY
Qty: 90 TABLET | Refills: 1 | Status: SHIPPED | OUTPATIENT
Start: 2022-04-06

## 2022-04-06 RX ORDER — CETIRIZINE HCL 10 MG
10 TABLET ORAL DAILY
Qty: 90 TABLET | Refills: 1 | Status: SHIPPED | OUTPATIENT
Start: 2022-04-06

## 2022-04-06 RX ORDER — FLUTICASONE PROPIONATE 50 MCG
2 SPRAY, SUSPENSION (ML) NASAL DAILY
Qty: 1 EACH | Refills: 2 | Status: SHIPPED | OUTPATIENT
Start: 2022-04-06

## 2022-04-06 RX ORDER — ALBUTEROL SULFATE 90 UG/1
2 AEROSOL, METERED RESPIRATORY (INHALATION)
Qty: 18 G | Refills: 3 | Status: SHIPPED | OUTPATIENT
Start: 2022-04-06

## 2022-04-06 NOTE — PROGRESS NOTES
Solo Andersen is a 62 y.o. male and presents with Follow Up Chronic Condition, Obesity, and Cholesterol Problem      Mr. Thuy Garcia came for establishment with me and he used to see Dr. Ronn Powell who has retired. He smokes about 4 to 5 cigarettes/day for last 50 years he has never tried anything to quit smoking and he agreed when I offered the help to try nicotine patch that I sent to the pharmacy. He had dyslipidemia and he is not on any medication I will repeat the baseline lipid profile. He used to do [place] and he lives with his fiancée for last several years and he has quit his job for last few years. He had history of lumbar fusion surgery in lumbar spine with Dr. Nakul Luther. He has appointment in next few weeks with him. He told me he does not take any medication including he does not take gabapentin or baclofen. He was getting ibuprofen 800 mg/day as needed and explained  adverse side effects for long-term use of NSAID that he should not do and he agreed. His blood pressure fortunately is controlled. He has not done any recent labs. He does not take multivitamins. He claims that he needs medicine for his allergies and he told me that Singulair was not covered by his insurance. He does not drink alcohol in excess. No history of substance abuse. He does not do exercise. He does not count calories. His BMI is 35.81. I reviewed the labs done in the [facility] that was done in the past he told me he had undergone 7 drug screen test then time when he was prescribed some narcotics for his pain medicine by Dr. Ronn Powell I told him that I do not prefer to be on narcotics. He told me he had history of lumbar spinal stenosis.     He had MRI of lumbar spine in March 2021 that I saw in the epic results set with him he had multilevel DJD of lumbar spine and moderate to severe thecal sac stenosis at L4-5 with mild to moderate bilateral neural foraminal narrowing left more than right at L4-L5. Review of Systems    Review of Systems   Constitutional: Negative. HENT: Negative for sinus pain and sore throat. Eyes: Negative for blurred vision. Respiratory: Negative for cough, hemoptysis and wheezing. Cardiovascular: Negative for chest pain, palpitations, orthopnea, leg swelling and PND. Gastrointestinal: Negative. Genitourinary: Negative. Musculoskeletal: Positive for back pain. H/o back surgery in back with dr Eliza Starr in 2019. History of of lumbar DJD   Neurological: Negative for dizziness, tingling, tremors, focal weakness and headaches. Psychiatric/Behavioral: Negative. Past Medical History:   Diagnosis Date    Arthritis     Chronic pain     back    Gout     Hyperlipidemia     Patient stated he was on medication     Lumbar radiculopathy     Psychiatric disorder     depression     Spinal stenosis      Past Surgical History:   Procedure Laterality Date    HX COLONOSCOPY      HX HERNIA REPAIR      twice    HX ORTHOPAEDIC      right hand trigger finger release      Social History     Socioeconomic History    Marital status:    Tobacco Use    Smoking status: Current Some Day Smoker     Packs/day: 0.25     Years: 30.00     Pack years: 7.50     Types: Cigarettes    Smokeless tobacco: Never Used    Tobacco comment: pt states 3 times a week   Vaping Use    Vaping Use: Never used   Substance and Sexual Activity    Alcohol use: Yes     Comment: occasionally    Drug use: Never    Sexual activity: Yes     Partners: Female     Family History   Problem Relation Age of Onset    Cancer Mother     Diabetes Sister     Heart Disease Brother     Alcohol abuse Father      Current Outpatient Medications   Medication Sig Dispense Refill    nicotine (NICODERM CQ) 14 mg/24 hr patch 1 Patch by TransDERmal route every twenty-four (24) hours for 30 days. 30 Patch 2    cetirizine (ZYRTEC) 10 mg tablet Take 1 Tablet by mouth daily.  90 Tablet 1    fluticasone propionate (FLONASE) 50 mcg/actuation nasal spray 2 Sprays by Both Nostrils route daily. 1 Each 2    albuterol (PROVENTIL HFA, VENTOLIN HFA, PROAIR HFA) 90 mcg/actuation inhaler Take 2 Puffs by inhalation every four (4) hours as needed for Wheezing. 18 g 3    montelukast (SINGULAIR) 10 mg tablet Take 1 Tablet by mouth daily. 90 Tablet 1    gabapentin (NEURONTIN) 300 mg capsule Take 300 mg by mouth three (3) times daily.  baclofen (LIORESAL) 10 mg tablet Take 10 mg by mouth two (2) times daily as needed. Allergies   Allergen Reactions    Fish Containing Products Nausea and Vomiting     Patient stated baked fish only patient stated can eat grilled fish         Objective:  Visit Vitals  /65 (BP 1 Location: Right upper arm, BP Patient Position: Sitting, BP Cuff Size: Adult)   Pulse 83   Temp 97.3 °F (36.3 °C) (Oral)   Resp 18   Ht 5' 10\" (1.778 m)   Wt 249 lb 9.6 oz (113.2 kg)   SpO2 97% Comment: RA   BMI 35.81 kg/m²       Physical Exam:   Constitutional: General Appearance: Pleasant. Level of Distress: NAD. Psychiatric: Mental Status: normal mood and affect Orientation: to time, place, and person. ,normal eye contact. Head: Head: normocephalic and atraumatic. Eyes: Pupils: PERRLA. Sclerae: non-icteric. Neck: Neck: supple, trachea midline, and no masses. Lymph Nodes: no cervical LAD. Thyroid: no enlargement or nodules and non-tender. Lungs: Respiratory effort: no dyspnea. Auscultation: no wheezing, rales/crackles, or rhonchi and breath sounds normal and good air movement. Cardiovascular: Apical Impulse: not displaced. Heart Auscultation: normal S1 and S2; no murmurs, rubs, or gallops; and RRR. Neck vessels: no carotid bruits. Pulses including femoral / pedal: normal throughout. Abdomen: Bowel Sounds: normal. Inspection and Palpation: no tenderness, guarding, or masses and soft and non-distended. Liver: non-tender and no hepatomegaly. Spleen: non-tender and no splenomegaly. Musculoskeletal[de-identified] Extremities: no edema,no varicosities. No Calf tenderness. Neurologic: Gait and Station: normal gait and station. Motor Strength normal right and left. Skin: Inspection and palpation: no rash, lesions, or ulcer. Results for orders placed or performed in visit on 01/31/22   7-DRUG SCREEN UNBUND, UR   Result Value Ref Range    Amphetamines, urine Negative Przode=8748 ng/mL    Barbiturates Negative Sgthkd=967 ng/mL    Benzodiazepines Negative Bxnhas=627 ng/mL    Cannabinoids Negative Cutoff=50 ng/mL    Cocaine Negative Yqkmcm=599 ng/mL    Opiates Negative Lmnqth=650 ng/mL    Phencyclidine Negative Cutoff=25 ng/mL       Assessment/Plan:      ICD-10-CM ICD-9-CM    1. Dyslipidemia  E78.5 272.4 CBC WITH AUTOMATED DIFF      METABOLIC PANEL, COMPREHENSIVE      LIPID PANEL      TSH 3RD GENERATION   2. Spinal stenosis of lumbosacral region  M48.07 724.02    3. Allergic rhinitis, unspecified seasonality, unspecified trigger  J30.9 477.9    4. Screening for colon cancer  Z12.11 V76.51 COLOGUARD TEST (FECAL DNA COLORECTAL CANCER SCREENING)   5. Need for hepatitis C screening test  Z11.59 V73.89 HEPATITIS C AB   6. Morbid obesity ([facility] [address]  E66.01 278.01    7. Osteoarthritis of spine with radiculopathy, lumbar region  M47.26 721.3    8. History of lumbosacral spine surgery  Z98.890 V15.29    9. Vitamin D deficiency  E55.9 268.9 VITAMIN D, 25 HYDROXY   10. Cigarette nicotine dependence with other nicotine-induced disorder  F17.218 292.89 REFERRAL TO PULMONARY DISEASE   11.  Mild intermittent asthma without complication  C40.73 386.02 REFERRAL TO PULMONARY DISEASE     Orders Placed This Encounter    COLOGUARD TEST (FECAL DNA COLORECTAL CANCER SCREENING)    CBC WITH AUTOMATED DIFF    METABOLIC PANEL, COMPREHENSIVE    LIPID PANEL    TSH 3RD GENERATION    HEPATITIS C AB    VITAMIN D, 25 HYDROXY    [address] [facility]monary [facility]     Referral Priority:   Routine     Referral Type:   Consultation Referral Reason:   Specialty Services Required     Referred to Provider:   Karina Ojeda DO     Number of Visits Requested:   1    nicotine (NICODERM CQ) 14 mg/24 hr patch     Si Patch by TransDERmal route every twenty-four (24) hours for 30 days. Dispense:  30 Patch     Refill:  2    cetirizine (ZYRTEC) 10 mg tablet     Sig: Take 1 Tablet by mouth daily. Dispense:  90 Tablet     Refill:  1    fluticasone propionate (FLONASE) 50 mcg/actuation nasal spray     Si Sprays by Both Nostrils route daily. Dispense:  1 Each     Refill:  2    albuterol (PROVENTIL HFA, VENTOLIN HFA, PROAIR HFA) 90 mcg/actuation inhaler     Sig: Take 2 Puffs by inhalation every four (4) hours as needed for Wheezing. Dispense:  18 g     Refill:  3    montelukast (SINGULAIR) 10 mg tablet     Sig: Take 1 Tablet by mouth daily. Dispense:  90 Tablet     Refill:  1       Dyslipidemia he had abnormal lipid profile in 2021 with triglyceride 272  and total cholesterol 225 with HDL 50 repeat labs ordered diet low in fat discussed about 1400-calorie diet plan and 1200-calorie diet plan educational brochures provided diet low in fried food and fast food and he should eat baked food with more unsaturated fat in the diet he agreed after reviewing labs if needed I will start him on low-dose of statin. Lumbar DJD with lumbar spinal stenosis status post spinal fusion surgery with Dr. Nicolette Juarez in  recommended to see him currently is not having that much pain but he has started pain he told me currently is not taking anything but he takes Tylenol 500 mg recommended to take 650 mg 2-3 times a day currently is not taking baclofen or gabapentin. In the past he was given tramadol with acetaminophen and hydrocodone for short period of time by orthopedic surgeon and Dr. Julio Cesar Elise. Recommended to lose weight with activity modification. Follow the plan and advice given by Dr. Nicolette Juarez.   Vitamin D level ordered recommended to start vitamin D and calcium-containing multivitamins. Allergic rhinitis started on cephalexin fluticasone nasal spray and montelukast.      Smoking 5 cigarettes a day for last 50 years started on nicotine patch that he agreed. He wants to quit smoking. Sometimes he gets out of breath and he does not know whether he has asthma or COPD refer him to pulmonologist.  Started on albuterol inhaler. And he might need to rule out sleep apnea. Referral given. He does not have taken COVID vaccine. Cologelbert ordered for screening colon cancer. Labs ordered. Follow-up in 3 months. Reviewed labs MRI results and previous notes. Shared with the patient and discussed in length about all his medical problems. lose weight, follow low fat diet, continue present plan, routine labs ordered, call if any problems    There are no Patient Instructions on file for this visit. Follow-up and Dispositions    · Return in about 3 months (around 7/6/2022) for follow up for chronic condition fast lab now.

## 2022-04-06 NOTE — PROGRESS NOTES
1. \"Have you been to the ER, urgent care clinic since your last visit? Hospitalized since your last visit? \" No    2. \"Have you seen or consulted any other health care providers outside of the [address] [place] [address] since your last visit? \" Yes When: 2 months ago Where: Orthopedics  Reason for visit: [facility]      3. For patients aged 39-70: Has the patient had a colonoscopy / FIT/ Cologuard? No      If the patient is female:    4. For patients aged 41-77: Has the patient had a mammogram within the past 2 years? NA - based on age or sex      11. For patients aged 21-65: Has the patient had a pap smear?  NA - based on age or sex     Chief Complaint   Patient presents with    Follow Up Chronic Condition    Obesity    Cholesterol Problem     Visit Vitals  /65 (BP 1 Location: Right upper arm, BP Patient Position: Sitting, BP Cuff Size: Adult)   Pulse 83   Temp 97.3 °F (36.3 °C) (Oral)   Resp 18   Ht 5' 10\" (1.778 m)   Wt 249 lb 9.6 oz (113.2 kg)   SpO2 97% Comment: RA   BMI 35.81 kg/m²

## 2022-04-09 LAB
25(OH)D3+25(OH)D2 SERPL-MCNC: 12.4 NG/ML (ref 30–100)
ALBUMIN SERPL-MCNC: 4 G/DL (ref 3.8–4.9)
ALBUMIN/GLOB SERPL: 1.5 {RATIO} (ref 1.2–2.2)
ALP SERPL-CCNC: 89 IU/L (ref 44–121)
ALT SERPL-CCNC: 24 IU/L (ref 0–44)
AST SERPL-CCNC: 18 IU/L (ref 0–40)
BASOPHILS # BLD AUTO: 0.1 X10E3/UL (ref 0–0.2)
BASOPHILS NFR BLD AUTO: 1 %
BILIRUB SERPL-MCNC: 0.4 MG/DL (ref 0–1.2)
BUN SERPL-MCNC: 18 MG/DL (ref 8–27)
BUN/CREAT SERPL: 15 (ref 10–24)
CALCIUM SERPL-MCNC: 9.2 MG/DL (ref 8.6–10.2)
CHLORIDE SERPL-SCNC: 105 MMOL/L (ref 96–106)
CHOLEST SERPL-MCNC: 220 MG/DL (ref 100–199)
CO2 SERPL-SCNC: 21 MMOL/L (ref 20–29)
CREAT SERPL-MCNC: 1.19 MG/DL (ref 0.76–1.27)
EGFR: 70 ML/MIN/1.73
EOSINOPHIL # BLD AUTO: 0.2 X10E3/UL (ref 0–0.4)
EOSINOPHIL NFR BLD AUTO: 3 %
ERYTHROCYTE [DISTWIDTH] IN BLOOD BY AUTOMATED COUNT: 12.9 % (ref 11.6–15.4)
GLOBULIN SER CALC-MCNC: 2.6 G/DL (ref 1.5–4.5)
GLUCOSE SERPL-MCNC: 94 MG/DL (ref 65–99)
HCT VFR BLD AUTO: 43 % (ref 37.5–51)
HCV AB S/CO SERPL IA: <0.1 S/CO RATIO (ref 0–0.9)
HDLC SERPL-MCNC: 56 MG/DL
HGB BLD-MCNC: 13.8 G/DL (ref 13–17.7)
IMM GRANULOCYTES # BLD AUTO: 0 X10E3/UL (ref 0–0.1)
IMM GRANULOCYTES NFR BLD AUTO: 0 %
LDLC SERPL CALC-MCNC: 152 MG/DL (ref 0–99)
LYMPHOCYTES # BLD AUTO: 2.5 X10E3/UL (ref 0.7–3.1)
LYMPHOCYTES NFR BLD AUTO: 40 %
MCH RBC QN AUTO: 29 PG (ref 26.6–33)
MCHC RBC AUTO-ENTMCNC: 32.1 G/DL (ref 31.5–35.7)
MCV RBC AUTO: 90 FL (ref 79–97)
MONOCYTES # BLD AUTO: 0.6 X10E3/UL (ref 0.1–0.9)
MONOCYTES NFR BLD AUTO: 9 %
NEUTROPHILS # BLD AUTO: 2.9 X10E3/UL (ref 1.4–7)
NEUTROPHILS NFR BLD AUTO: 47 %
PLATELET # BLD AUTO: 190 X10E3/UL (ref 150–450)
POTASSIUM SERPL-SCNC: 4.3 MMOL/L (ref 3.5–5.2)
PROT SERPL-MCNC: 6.6 G/DL (ref 6–8.5)
RBC # BLD AUTO: 4.76 X10E6/UL (ref 4.14–5.8)
SODIUM SERPL-SCNC: 140 MMOL/L (ref 134–144)
TRIGL SERPL-MCNC: 71 MG/DL (ref 0–149)
TSH SERPL DL<=0.005 MIU/L-ACNC: 1.87 UIU/ML (ref 0.45–4.5)
VLDLC SERPL CALC-MCNC: 12 MG/DL (ref 5–40)
WBC # BLD AUTO: 6.2 X10E3/UL (ref 3.4–10.8)

## 2022-04-10 RX ORDER — ATORVASTATIN CALCIUM 10 MG/1
10 TABLET, FILM COATED ORAL
Qty: 90 TABLET | Refills: 1 | Status: SHIPPED | OUTPATIENT
Start: 2022-04-10

## 2022-04-10 RX ORDER — ERGOCALCIFEROL 1.25 MG/1
50000 CAPSULE ORAL
Qty: 12 CAPSULE | Refills: 0 | Status: SHIPPED | OUTPATIENT
Start: 2022-04-10

## 2022-04-10 NOTE — PROGRESS NOTES
Please call  Mr. Mathieu Silva complete blood count is normal but heHis cholesterol readings are high which can increase the risk of stroke heart disease and other circulatory problems. I am starting him on atorvastatin 10 mg at bedtime and repeat lipid profiles in next 3 months. NextChange the lifestyle with healthy eating habits and walking. Vitamin D is low I am sending vitamin D prescription once a week to the pharmacy for 3 months and we will repeat in 3 months.     Thyroid function is normal    Screening for hepatitis C normal.    He used to see Dr. Alise Martins in the past but now he is my patient

## 2022-04-11 PROBLEM — J30.9 ALLERGIC RHINITIS, UNSPECIFIED SEASONALITY, UNSPECIFIED TRIGGER: Status: ACTIVE | Noted: 2022-04-06

## 2022-04-11 PROBLEM — E55.9 VITAMIN D DEFICIENCY: Status: ACTIVE | Noted: 2022-04-06

## 2022-04-13 LAB — COLOGUARD TEST, EXTERNAL: NEGATIVE

## 2022-05-05 PROBLEM — Z12.11 SCREENING FOR COLON CANCER: Status: RESOLVED | Noted: 2022-04-05 | Resolved: 2022-05-05

## 2022-05-05 PROBLEM — Z11.59 NEED FOR HEPATITIS C SCREENING TEST: Status: RESOLVED | Noted: 2022-04-05 | Resolved: 2022-05-05

## 2022-06-20 ENCOUNTER — OFFICE VISIT (OUTPATIENT)
Dept: INTERNAL MEDICINE CLINIC | Age: 62
End: 2022-06-20
Payer: COMMERCIAL

## 2022-06-20 VITALS
WEIGHT: 246 LBS | RESPIRATION RATE: 12 BRPM | DIASTOLIC BLOOD PRESSURE: 76 MMHG | OXYGEN SATURATION: 99 % | BODY MASS INDEX: 35.22 KG/M2 | HEIGHT: 70 IN | HEART RATE: 88 BPM | SYSTOLIC BLOOD PRESSURE: 136 MMHG

## 2022-06-20 DIAGNOSIS — E66.01 SEVERE OBESITY (BMI 35.0-35.9 WITH COMORBIDITY) (HCC): ICD-10-CM

## 2022-06-20 DIAGNOSIS — Z01.818 PREOP EXAM FOR INTERNAL MEDICINE: Primary | ICD-10-CM

## 2022-06-20 DIAGNOSIS — M47.26 OSTEOARTHRITIS OF SPINE WITH RADICULOPATHY, LUMBAR REGION: ICD-10-CM

## 2022-06-20 DIAGNOSIS — E78.5 DYSLIPIDEMIA: ICD-10-CM

## 2022-06-20 DIAGNOSIS — F17.218 CIGARETTE NICOTINE DEPENDENCE WITH OTHER NICOTINE-INDUCED DISORDER: ICD-10-CM

## 2022-06-20 DIAGNOSIS — M48.07 SPINAL STENOSIS OF LUMBOSACRAL REGION: ICD-10-CM

## 2022-06-20 PROCEDURE — 99214 OFFICE O/P EST MOD 30 MIN: CPT | Performed by: INTERNAL MEDICINE

## 2022-06-20 RX ORDER — GABAPENTIN 300 MG/1
300 CAPSULE ORAL 3 TIMES DAILY
Qty: 90 CAPSULE | Refills: 1 | Status: SHIPPED | OUTPATIENT
Start: 2022-06-20 | End: 2022-10-01

## 2022-06-20 RX ORDER — NABUMETONE 500 MG/1
500 TABLET, FILM COATED ORAL 2 TIMES DAILY
Qty: 60 TABLET | Refills: 1 | Status: SHIPPED | OUTPATIENT
Start: 2022-06-20 | End: 2022-08-19

## 2022-06-20 NOTE — PROGRESS NOTES
Janiya Vasquez is a 62 y.o. male and presents with Pre-op Exam (Left foot surgery )    Mr. Kevin Stoll is new to me, he is here for preop evaluation for left foot suerrgery, he says he will have a spur removed apparently but he's not completely sure, he sees Dr. Gaby Kyle [facility] in [facility]. He saw Dr. Virginia Rodriguez in April, I reviewed her note. He has been taking the statin recently prescribed as well as vitamin D. He has chronic low back pain under the care of Orthopedics Dr. Rosana Boeck, he had in November L4-5 bilateral decompression, he was taking gabapentin last year but he says he ran out of it and he stopped taking it, he feels that it used to help with controlling the pain better, he was also to take nabumetone as needed which used to help him as well. He rates his pain usually as 8/10 most of the days  He says he did cologuard around 2 months ago and he received a call saying it was normal.   Smoker, he says now he smokes a pack a week, sometimes he has wheezing specially when doing activity, he has to use albuterol when he mows the lawn and when walking log distances, he has winter sob when walking. He was referred in last visit to pulmonary and also for r/o SHARON, but he has not made appointment, lost referral.      Review of Systems  Review of Systems   Constitutional: Negative for chills, fatigue, fever and unexpected weight change. HENT: Negative for congestion, ear pain, sneezing and sore throat. Eyes: Negative for pain and discharge. Respiratory: Negative for cough, shortness of breath and wheezing. Cardiovascular: Negative for chest pain, palpitations and leg swelling. Gastrointestinal: Negative for abdominal pain, blood in stool, constipation and diarrhea. Endocrine: Negative for polydipsia and polyuria. Genitourinary: Negative for difficulty urinating, dysuria, frequency, hematuria and urgency. Musculoskeletal: Negative for arthralgias, back pain and joint swelling.    Skin: Negative for rash. Allergic/Immunologic: Negative for environmental allergies and food allergies. Neurological: Negative for dizziness, speech difficulty, weakness, light-headedness, numbness and headaches. Hematological: Negative for adenopathy. Psychiatric/Behavioral: Negative for behavioral problems (Depression), sleep disturbance and suicidal ideas. Past Medical History:   Diagnosis Date    Arthritis     Chronic pain     back    Gout     Hyperlipidemia     Patient stated he was on medication     Lumbar radiculopathy     Psychiatric disorder     depression     Spinal stenosis      Past Surgical History:   Procedure Laterality Date    HX COLONOSCOPY      HX HERNIA REPAIR      twice    HX ORTHOPAEDIC      right hand trigger finger release      Social History     Socioeconomic History    Marital status:    Tobacco Use    Smoking status: Current Some Day Smoker     Packs/day: 0.25     Years: 30.00     Pack years: 7.50     Types: Cigarettes    Smokeless tobacco: Never Used    Tobacco comment: pt states 3 times a week   Vaping Use    Vaping Use: Never used   Substance and Sexual Activity    Alcohol use: Yes     Comment: occasionally    Drug use: Never    Sexual activity: Yes     Partners: Female     Social Determinants of Health     Financial Resource Strain: High Risk    Difficulty of Paying Living Expenses: Very hard   Food Insecurity: No Food Insecurity    Worried About Running Out of Food in the Last Year: Never true    Ran Out of Food in the Last Year: Never true     Family History   Problem Relation Age of Onset    Cancer Mother     Diabetes Sister     Heart Disease Brother     Alcohol abuse Father      Current Outpatient Medications   Medication Sig Dispense Refill    atorvastatin (LIPITOR) 10 mg tablet Take 1 Tablet by mouth nightly. 90 Tablet 1    ergocalciferol (ERGOCALCIFEROL) 1,250 mcg (50,000 unit) capsule Take 1 Capsule by mouth every seven (7) days.  12 Capsule 0    cetirizine (ZYRTEC) 10 mg tablet Take 1 Tablet by mouth daily. 90 Tablet 1    fluticasone propionate (FLONASE) 50 mcg/actuation nasal spray 2 Sprays by Both Nostrils route daily. 1 Each 2    albuterol (PROVENTIL HFA, VENTOLIN HFA, PROAIR HFA) 90 mcg/actuation inhaler Take 2 Puffs by inhalation every four (4) hours as needed for Wheezing. 18 g 3    montelukast (SINGULAIR) 10 mg tablet Take 1 Tablet by mouth daily. 90 Tablet 1    gabapentin (NEURONTIN) 300 mg capsule Take 300 mg by mouth three (3) times daily.  baclofen (LIORESAL) 10 mg tablet Take 10 mg by mouth two (2) times daily as needed. Allergies   Allergen Reactions    Fish Containing Products Nausea and Vomiting     Patient stated baked fish only patient stated can eat grilled fish         Objective:  Visit Vitals  /76 (BP 1 Location: Right upper arm, BP Patient Position: Sitting, BP Cuff Size: Adult)   Pulse 88   Resp 12   Ht 5' 10\" (1.778 m)   Wt 246 lb (111.6 kg)   SpO2 99%   BMI 35.30 kg/m²     Physical Exam:   Physical Exam  Constitutional:       General: He is not in acute distress. Appearance: Normal appearance. HENT:      Head: Normocephalic and atraumatic. Mouth/Throat:      Mouth: Mucous membranes are moist.   Eyes:      Extraocular Movements: Extraocular movements intact. Conjunctiva/sclera: Conjunctivae normal.      Pupils: Pupils are equal, round, and reactive to light. Cardiovascular:      Rate and Rhythm: Normal rate and regular rhythm. Pulses: Normal pulses. Heart sounds: Normal heart sounds. Pulmonary:      Effort: Pulmonary effort is normal.      Breath sounds: Normal breath sounds. Abdominal:      General: Abdomen is flat. Bowel sounds are normal. There is no distension. Palpations: Abdomen is soft. There is no mass. Tenderness: There is no abdominal tenderness. Musculoskeletal:         General: No swelling or deformity.       Cervical back: Normal range of motion and neck supple. Right lower leg: No edema. Left lower leg: No edema. Lymphadenopathy:      Cervical: No cervical adenopathy. Skin:     General: Skin is warm and dry. Capillary Refill: Capillary refill takes less than 2 seconds. Coloration: Skin is not jaundiced or pale. Findings: No erythema or rash. Neurological:      General: No focal deficit present. Mental Status: He is alert and oriented to person, place, and time. Psychiatric:         Mood and Affect: Mood normal.         Behavior: Behavior normal.         Thought Content: Thought content normal.         Judgment: Judgment normal.          Results for orders placed or performed in visit on 04/06/22   CBC WITH AUTOMATED DIFF   Result Value Ref Range    WBC 6.2 3.4 - 10.8 x10E3/uL    RBC 4.76 4.14 - 5.80 x10E6/uL    HGB 13.8 13.0 - 17.7 g/dL    HCT 43.0 37.5 - 51.0 %    MCV 90 79 - 97 fL    MCH 29.0 26.6 - 33.0 pg    MCHC 32.1 31.5 - 35.7 g/dL    RDW 12.9 11.6 - 15.4 %    PLATELET 285 193 - 670 x10E3/uL    NEUTROPHILS 47 Not Estab. %    Lymphocytes 40 Not Estab. %    MONOCYTES 9 Not Estab. %    EOSINOPHILS 3 Not Estab. %    BASOPHILS 1 Not Estab. %    ABS. NEUTROPHILS 2.9 1.4 - 7.0 x10E3/uL    Abs Lymphocytes 2.5 0.7 - 3.1 x10E3/uL    ABS. MONOCYTES 0.6 0.1 - 0.9 x10E3/uL    ABS. EOSINOPHILS 0.2 0.0 - 0.4 x10E3/uL    ABS. BASOPHILS 0.1 0.0 - 0.2 x10E3/uL    IMMATURE GRANULOCYTES 0 Not Estab. %    ABS. IMM.  GRANS. 0.0 0.0 - 0.1 J10D7/OH   METABOLIC PANEL, COMPREHENSIVE   Result Value Ref Range    Glucose 94 65 - 99 mg/dL    BUN 18 8 - 27 mg/dL    Creatinine 1.19 0.76 - 1.27 mg/dL    eGFR 70 >59 mL/min/1.73    BUN/Creatinine ratio 15 10 - 24    Sodium 140 134 - 144 mmol/L    Potassium 4.3 3.5 - 5.2 mmol/L    Chloride 105 96 - 106 mmol/L    CO2 21 20 - 29 mmol/L    Calcium 9.2 8.6 - 10.2 mg/dL    Protein, total 6.6 6.0 - 8.5 g/dL    Albumin 4.0 3.8 - 4.9 g/dL    GLOBULIN, TOTAL 2.6 1.5 - 4.5 g/dL    A-G Ratio 1.5 1.2 - 2.2    Bilirubin, total 0.4 0.0 - 1.2 mg/dL    Alk. phosphatase 89 44 - 121 IU/L    AST (SGOT) 18 0 - 40 IU/L    ALT (SGPT) 24 0 - 44 IU/L   LIPID PANEL   Result Value Ref Range    Cholesterol, total 220 (H) 100 - 199 mg/dL    Triglyceride 71 0 - 149 mg/dL    HDL Cholesterol 56 >39 mg/dL    VLDL, calculated 12 5 - 40 mg/dL    LDL, calculated 152 (H) 0 - 99 mg/dL   TSH 3RD GENERATION   Result Value Ref Range    TSH 1.870 0.450 - 4.500 uIU/mL   HEPATITIS C AB   Result Value Ref Range    Hep C Virus Ab <0.1 0.0 - 0.9 s/co ratio   VITAMIN D, 25 HYDROXY   Result Value Ref Range    VITAMIN D, 25-HYDROXY 12.4 (L) 30.0 - 100.0 ng/mL       Assessment/Plan:    Low risk for low risk procedure, check the following labs. Counseled about smoking cessation, reprinted him the referral to pulmonary. Counseled about lifestyle modification, weight loss, low back pain is persistent despite of having had surgery, he can restart gabapentin as below and nabumetone only as needed, educated him about long-term effects of NSAIDs on cardiovascular health and kidney function. ICD-10-CM ICD-9-CM    1. Preop exam for internal medicine  Z01.818 V72.83 CBC WITH AUTOMATED DIFF      METABOLIC PANEL, BASIC   2. Spinal stenosis of lumbosacral region  M48.07 724.02    3. Osteoarthritis of spine with radiculopathy, lumbar region  M47.26 721.3    4. Cigarette nicotine dependence with other nicotine-induced disorder  F17.218 292.89    5. Severe obesity (BMI 35.0-35.9 with comorbidity) ([facility]  E66.01 278.01     Z68.35 V85.35      Orders Placed This Encounter    CBC WITH AUTOMATED DIFF    METABOLIC PANEL, BASIC     lose weight, increase physical activity, follow low fat diet, follow low salt diet, routine labs ordered, call if any problems  There are no Patient Instructions on file for this visit.

## 2022-06-20 NOTE — PROGRESS NOTES
Chief Complaint   Patient presents with    Pre-op Exam     Left foot surgery      Visit Vitals  /76 (BP 1 Location: Right upper arm, BP Patient Position: Sitting, BP Cuff Size: Adult)   Pulse 88   Resp 12   Ht 5' 10\" (1.778 m)   Wt 246 lb (111.6 kg)   SpO2 99%   BMI 35.30 kg/m²     1. \"Have you been to the ER, urgent care clinic since your last visit? Hospitalized since your last visit? \" No    2. \"Have you seen or consulted any other health care providers outside of the [address] [place] [address] since your last visit? \" Dr.Noel Porter [facility]     3. For patients aged 39-70: Has the patient had a colonoscopy / FIT/ Cologuard? Yes - Care Gap present. Rooming MA/LPN to request most recent resultsPt states about 10 years ago at [facility] in [place]       If the patient is female:    4. For patients aged 41-77: Has the patient had a mammogram within the past 2 years? NA - based on age or sex      11. For patients aged 21-65: Has the patient had a pap smear?  NA - based on age or sex

## 2022-06-21 LAB
BASOPHILS # BLD AUTO: 0 X10E3/UL (ref 0–0.2)
BASOPHILS NFR BLD AUTO: 1 %
BUN SERPL-MCNC: 19 MG/DL (ref 8–27)
BUN/CREAT SERPL: 16 (ref 10–24)
CALCIUM SERPL-MCNC: 9.1 MG/DL (ref 8.6–10.2)
CHLORIDE SERPL-SCNC: 102 MMOL/L (ref 96–106)
CO2 SERPL-SCNC: 21 MMOL/L (ref 20–29)
CREAT SERPL-MCNC: 1.19 MG/DL (ref 0.76–1.27)
EGFR: 70 ML/MIN/1.73
EOSINOPHIL # BLD AUTO: 0.2 X10E3/UL (ref 0–0.4)
EOSINOPHIL NFR BLD AUTO: 3 %
ERYTHROCYTE [DISTWIDTH] IN BLOOD BY AUTOMATED COUNT: 13.1 % (ref 11.6–15.4)
GLUCOSE SERPL-MCNC: 105 MG/DL (ref 65–99)
HCT VFR BLD AUTO: 44.5 % (ref 37.5–51)
HGB BLD-MCNC: 14.1 G/DL (ref 13–17.7)
IMM GRANULOCYTES # BLD AUTO: 0 X10E3/UL (ref 0–0.1)
IMM GRANULOCYTES NFR BLD AUTO: 1 %
LYMPHOCYTES # BLD AUTO: 2.7 X10E3/UL (ref 0.7–3.1)
LYMPHOCYTES NFR BLD AUTO: 42 %
MCH RBC QN AUTO: 29.1 PG (ref 26.6–33)
MCHC RBC AUTO-ENTMCNC: 31.7 G/DL (ref 31.5–35.7)
MCV RBC AUTO: 92 FL (ref 79–97)
MONOCYTES # BLD AUTO: 0.7 X10E3/UL (ref 0.1–0.9)
MONOCYTES NFR BLD AUTO: 12 %
NEUTROPHILS # BLD AUTO: 2.5 X10E3/UL (ref 1.4–7)
NEUTROPHILS NFR BLD AUTO: 41 %
PLATELET # BLD AUTO: 198 X10E3/UL (ref 150–450)
POTASSIUM SERPL-SCNC: 4.7 MMOL/L (ref 3.5–5.2)
RBC # BLD AUTO: 4.84 X10E6/UL (ref 4.14–5.8)
SODIUM SERPL-SCNC: 140 MMOL/L (ref 134–144)
WBC # BLD AUTO: 6.1 X10E3/UL (ref 3.4–10.8)

## 2022-08-26 ENCOUNTER — TELEPHONE (OUTPATIENT)
Dept: INTERNAL MEDICINE CLINIC | Age: 62
End: 2022-08-26

## 2022-08-26 NOTE — TELEPHONE ENCOUNTER
----- Message from Bonita Keller sent at 8/23/2022  1:53 PM EDT -----  Subject: Appointment Request    Reason for Call: Established Patient Appointment needed: Routine Existing   Condition Follow Up    QUESTIONS    Reason for appointment request? Available appointments did not meet   patient need     Additional Information for Provider?  Pt's wife is scheduled 10/12/22 at   2pm and they get transportation together and want to know if there is   anyway pt can be seen for his follow up also.   ---------------------------------------------------------------------------  --------------  [address]  [phone]; OK to leave message on voicemail  ---------------------------------------------------------------------------  --------------  SCRIPT ANSWERS  COVID Screen: Juno Mota

## 2022-09-29 DIAGNOSIS — M47.26 OSTEOARTHRITIS OF SPINE WITH RADICULOPATHY, LUMBAR REGION: ICD-10-CM

## 2022-09-29 DIAGNOSIS — M48.07 SPINAL STENOSIS OF LUMBOSACRAL REGION: ICD-10-CM

## 2022-10-01 RX ORDER — GABAPENTIN 300 MG/1
CAPSULE ORAL
Qty: 90 CAPSULE | Refills: 3 | Status: SHIPPED | OUTPATIENT
Start: 2022-10-01

## 2022-10-12 ENCOUNTER — OFFICE VISIT (OUTPATIENT)
Dept: INTERNAL MEDICINE CLINIC | Age: 62
End: 2022-10-12
Payer: COMMERCIAL

## 2022-10-12 VITALS
OXYGEN SATURATION: 98 % | HEART RATE: 80 BPM | BODY MASS INDEX: 34.79 KG/M2 | DIASTOLIC BLOOD PRESSURE: 80 MMHG | SYSTOLIC BLOOD PRESSURE: 136 MMHG | RESPIRATION RATE: 12 BRPM | HEIGHT: 70 IN | WEIGHT: 243 LBS

## 2022-10-12 DIAGNOSIS — M48.07 SPINAL STENOSIS OF LUMBOSACRAL REGION: ICD-10-CM

## 2022-10-12 DIAGNOSIS — G89.4 CHRONIC PAIN SYNDROME: ICD-10-CM

## 2022-10-12 DIAGNOSIS — M47.26 OSTEOARTHRITIS OF SPINE WITH RADICULOPATHY, LUMBAR REGION: ICD-10-CM

## 2022-10-12 DIAGNOSIS — E55.9 VITAMIN D DEFICIENCY: ICD-10-CM

## 2022-10-12 DIAGNOSIS — N18.2 CHRONIC KIDNEY DISEASE (CKD), ACTIVE MEDICAL MANAGEMENT WITHOUT DIALYSIS, STAGE 2 (MILD): ICD-10-CM

## 2022-10-12 DIAGNOSIS — Z23 NEEDS FLU SHOT: ICD-10-CM

## 2022-10-12 DIAGNOSIS — Z51.81 MEDICATION MONITORING ENCOUNTER: ICD-10-CM

## 2022-10-12 DIAGNOSIS — M67.911 DISORDER OF ROTATOR CUFF, RIGHT: Primary | ICD-10-CM

## 2022-10-12 DIAGNOSIS — M47.816 SPONDYLOSIS OF LUMBAR REGION WITHOUT MYELOPATHY OR RADICULOPATHY: ICD-10-CM

## 2022-10-12 PROCEDURE — 99214 OFFICE O/P EST MOD 30 MIN: CPT | Performed by: INTERNAL MEDICINE

## 2022-10-12 PROCEDURE — 90686 IIV4 VACC NO PRSV 0.5 ML IM: CPT | Performed by: INTERNAL MEDICINE

## 2022-10-12 RX ORDER — DICLOFENAC SODIUM 10 MG/G
2 GEL TOPICAL 4 TIMES DAILY
Qty: 100 G | Refills: 2 | Status: SHIPPED | OUTPATIENT
Start: 2022-10-12

## 2022-10-12 RX ORDER — LIDOCAINE 50 MG/G
1 PATCH TOPICAL EVERY 24 HOURS
Qty: 30 EACH | Refills: 1 | Status: SHIPPED | OUTPATIENT
Start: 2022-10-12

## 2022-10-12 RX ORDER — TRAMADOL HYDROCHLORIDE 50 MG/1
50 TABLET ORAL
Qty: 21 TABLET | Refills: 0 | Status: SHIPPED | OUTPATIENT
Start: 2022-10-12 | End: 2022-10-19

## 2022-10-12 RX ORDER — BACLOFEN 20 MG/1
20 TABLET ORAL 2 TIMES DAILY
Qty: 60 TABLET | Refills: 2 | Status: SHIPPED | OUTPATIENT
Start: 2022-10-12 | End: 2023-01-10

## 2022-10-12 NOTE — PROGRESS NOTES
Ruddy Garcia is a 62 y.o. male and presents with Follow-up    Durgaper Vaughn says he had one of the left foot surgeries, but still having significant pain, following up for this with his podiatrist [facility]. He's concerned because of pain in his right shoulder that has been progressing in the past 3 months, he says that he has lost at least 70% of the function of his right arm. He says he can not open a window with his right arm, he has to sleep with his arm up on a pillow to try to get some of the pain away, he says the pain in the shoulder does not let him sleep. He says he has been developing numbness in the first 3 fingers of right hand. He says he can barely use his right arm now, he has to do everything with his left hand, getting dressed is difficult, taking a shower, most of his ADLs now are difficult for him to do as he used to due to the pain and difficulty moving his right arm     He has lumbar spinal stenosis as well, uncontrolled pain, he has prior history of L4-L5 laminoforaminotomy in 2021, he has tried PT, has tried steroid injections. He is on gabapentin 300 mg TID, takes nabumetone as needed. He says he's in pain all the time. His pain usually from 1 to 10 is 9, after he takes the medication goes ore or less down to 7. Review of Systems  Review of Systems   Constitutional:  Negative for chills, fatigue, fever and unexpected weight change. HENT:  Negative for congestion, ear pain, sneezing and sore throat. Eyes:  Negative for pain and discharge. Respiratory:  Negative for cough, shortness of breath and wheezing. Cardiovascular:  Negative for chest pain, palpitations and leg swelling. Gastrointestinal:  Negative for abdominal pain, blood in stool, constipation and diarrhea. Endocrine: Negative for polydipsia and polyuria. Genitourinary:  Negative for difficulty urinating, dysuria, frequency, hematuria and urgency.    Musculoskeletal:  Negative for arthralgias, back pain and joint swelling. Skin:  Negative for rash. Allergic/Immunologic: Negative for environmental allergies and food allergies. Neurological:  Negative for dizziness, speech difficulty, weakness, light-headedness, numbness and headaches. Hematological:  Negative for adenopathy. Psychiatric/Behavioral:  Negative for behavioral problems (Depression), sleep disturbance and suicidal ideas. Past Medical History:   Diagnosis Date    Arthritis     Chronic pain     back    Gout     Hyperlipidemia     Patient stated he was on medication     Lumbar radiculopathy     Psychiatric disorder     depression     Spinal stenosis      Past Surgical History:   Procedure Laterality Date    HX COLONOSCOPY      HX HERNIA REPAIR      twice    HX ORTHOPAEDIC      right hand trigger finger release      Social History     Socioeconomic History    Marital status:    Tobacco Use    Smoking status: Some Days     Packs/day: 0.25     Years: 30.00     Pack years: 7.50     Types: Cigarettes    Smokeless tobacco: Never    Tobacco comments:     pt states 3 times a week   Vaping Use    Vaping Use: Never used   Substance and Sexual Activity    Alcohol use: Yes     Comment: occasionally    Drug use: Never    Sexual activity: Yes     Partners: Female     Social Determinants of Health     Financial Resource Strain: High Risk    Difficulty of Paying Living Expenses: Very hard   Food Insecurity: No Food Insecurity    Worried About Running Out of Food in the Last Year: Never true    Ran Out of Food in the Last Year: Never true     Family History   Problem Relation Age of Onset    Cancer Mother     Diabetes Sister     Heart Disease Brother     Alcohol abuse Father      Current Outpatient Medications   Medication Sig Dispense Refill    traMADoL (Ultram) 50 mg tablet Take 1 Tablet by mouth every eight (8) hours as needed for Pain for up to 7 days.  Max Daily Amount: 150 mg. 21 Tablet 0    baclofen (LIORESAL) 20 mg tablet Take 1 Tablet by mouth two (2) times a day for 90 days. 60 Tablet 2    diclofenac (VOLTAREN) 1 % gel Apply 2 g to affected area four (4) times daily. 100 g 2    lidocaine (LIDODERM) 5 % 1 Patch by TransDERmal route every twenty-four (24) hours. Apply patch to the affected area for 12 hours a day and remove for 12 hours a day. 30 Each 1    gabapentin (NEURONTIN) 300 mg capsule take 1 capsule by mouth three times a day **DAILY MAX: 3 CAPSULES** 90 Capsule 3    atorvastatin (LIPITOR) 10 mg tablet Take 1 Tablet by mouth nightly. 90 Tablet 1    ergocalciferol (ERGOCALCIFEROL) 1,250 mcg (50,000 unit) capsule Take 1 Capsule by mouth every seven (7) days. 12 Capsule 0    cetirizine (ZYRTEC) 10 mg tablet Take 1 Tablet by mouth daily. 90 Tablet 1    fluticasone propionate (FLONASE) 50 mcg/actuation nasal spray 2 Sprays by Both Nostrils route daily. 1 Each 2    albuterol (PROVENTIL HFA, VENTOLIN HFA, PROAIR HFA) 90 mcg/actuation inhaler Take 2 Puffs by inhalation every four (4) hours as needed for Wheezing. 18 g 3    montelukast (SINGULAIR) 10 mg tablet Take 1 Tablet by mouth daily. 90 Tablet 1     Allergies   Allergen Reactions    Fish Containing Products Nausea and Vomiting     Patient stated baked fish only patient stated can eat grilled fish         Objective:  Visit Vitals  /80 (BP 1 Location: Right upper arm, BP Patient Position: Sitting, BP Cuff Size: Large adult)   Pulse 80   Resp 12   Ht 5' 10\" (1.778 m)   Wt 243 lb (110.2 kg)   SpO2 98%   BMI 34.87 kg/m²     Physical Exam:   Physical Exam  Constitutional:       General: He is not in acute distress. Appearance: Normal appearance. He is obese. HENT:      Head: Normocephalic and atraumatic. Mouth/Throat:      Mouth: Mucous membranes are moist.   Eyes:      Extraocular Movements: Extraocular movements intact. Conjunctiva/sclera: Conjunctivae normal.      Pupils: Pupils are equal, round, and reactive to light.    Cardiovascular:      Rate and Rhythm: Normal rate and regular rhythm. Pulses: Normal pulses. Heart sounds: Normal heart sounds. Pulmonary:      Effort: Pulmonary effort is normal.      Breath sounds: Normal breath sounds. Abdominal:      General: Abdomen is flat. Bowel sounds are normal. There is no distension. Palpations: Abdomen is soft. There is no mass. Tenderness: no abdominal tenderness   Musculoskeletal:         General: No swelling or deformity. Right shoulder: Tenderness and crepitus present. Decreased range of motion. Decreased strength. Cervical back: Normal range of motion and neck supple. Right lower leg: No edema. Left lower leg: No edema. Lymphadenopathy:      Cervical: No cervical adenopathy. Skin:     General: Skin is warm and dry. Capillary Refill: Capillary refill takes less than 2 seconds. Coloration: Skin is not jaundiced or pale. Findings: No erythema or rash. Neurological:      General: No focal deficit present. Mental Status: He is alert and oriented to person, place, and time. Psychiatric:         Mood and Affect: Mood normal.         Behavior: Behavior normal.         Thought Content: Thought content normal.         Judgment: Judgment normal.        Results for orders placed or performed in visit on 06/20/22   CBC WITH AUTOMATED DIFF   Result Value Ref Range    WBC 6.1 3.4 - 10.8 x10E3/uL    RBC 4.84 4.14 - 5.80 x10E6/uL    HGB 14.1 13.0 - 17.7 g/dL    HCT 44.5 37.5 - 51.0 %    MCV 92 79 - 97 fL    MCH 29.1 26.6 - 33.0 pg    MCHC 31.7 31.5 - 35.7 g/dL    RDW 13.1 11.6 - 15.4 %    PLATELET 375 902 - 995 x10E3/uL    NEUTROPHILS 41 Not Estab. %    Lymphocytes 42 Not Estab. %    MONOCYTES 12 Not Estab. %    EOSINOPHILS 3 Not Estab. %    BASOPHILS 1 Not Estab. %    ABS. NEUTROPHILS 2.5 1.4 - 7.0 x10E3/uL    Abs Lymphocytes 2.7 0.7 - 3.1 x10E3/uL    ABS. MONOCYTES 0.7 0.1 - 0.9 x10E3/uL    ABS. EOSINOPHILS 0.2 0.0 - 0.4 x10E3/uL    ABS.  BASOPHILS 0.0 0.0 - 0.2 x10E3/uL IMMATURE GRANULOCYTES 1 Not Estab. %    ABS. IMM. GRANS. 0.0 0.0 - 0.1 L73M3/YA   METABOLIC PANEL, BASIC   Result Value Ref Range    Glucose 105 (H) 65 - 99 mg/dL    BUN 19 8 - 27 mg/dL    Creatinine 1.19 0.76 - 1.27 mg/dL    eGFR 70 >59 mL/min/1.73    BUN/Creatinine ratio 16 10 - 24    Sodium 140 134 - 144 mmol/L    Potassium 4.7 3.5 - 5.2 mmol/L    Chloride 102 96 - 106 mmol/L    CO2 21 20 - 29 mmol/L    Calcium 9.1 8.6 - 10.2 mg/dL       Assessment/Plan:    Possible tear of right rotator cuff considering symptoms and findings,plan below. He has multiple pains as mentioned, ckd stage 2, has tried multiple options for pain treatment, he is not a good candidate for chronic nsaids given he has CKD and he has been on them chronically already. Will do trial of tramadol. Educated him about opioids, risks, possible side effects, precautions, rules. Do DUS today. He needs to see orthopedics for his right shoulder, he's already seeing Dr. Srinivas Friedman for his back. ICD-10-CM ICD-9-CM    1. Disorder of rotator cuff, right  M67.911 726.10 MRI SHOULDER RT WO CONT      REFERRAL TO ORTHOPEDICS      diclofenac (VOLTAREN) 1 % gel      lidocaine (LIDODERM) 5 %      2. Spinal stenosis of lumbosacral region  M48.07 724.02 baclofen (LIORESAL) 20 mg tablet      3. Spondylosis of lumbar region without myelopathy or radiculopathy  M47.816 721.3       4. Osteoarthritis of spine with radiculopathy, lumbar region  M47.26 721.3       5. Chronic pain syndrome  G89.4 338.4 traMADoL (Ultram) 50 mg tablet      6. Chronic kidney disease (CKD), active medical management without dialysis, stage 2 (mild)  N18.2 585.2       7. Medication monitoring encounter  Z51.81 V58.83 TOXASSURE SELECT 13 (MW)      8. Vitamin D deficiency  E55.9 268.9 VITAMIN D, 25 HYDROXY      9.  Needs flu shot  Z23 V04.81 INFLUENZA, FLUARIX, FLULAVAL, FLUZONE (AGE 6 MO+), AFLURIA(AGE 3Y+) IM, PF, 0.5 ML        Orders Placed This Encounter    MRI SHOULDER RT WO CONT Standing Status:   Future     Standing Expiration Date:   2023     Order Specific Question:   Arthrogram study     Answer:   Yes    Influenza, FLUARIX, FLULAVAL, Fluzone (age 10 mo+), AFLURIA (age 3y+) IM, PF, 0.5 mL    TOXASSURE SELECT 13 (MW)    VITAMIN D, 25 HYDROXY    [place] Shoulder [facility]     Referral Priority:   Routine     Referral Type:   Consultation     Referral Reason:   Specialty Services Required     Referred to Provider:   Elian Alba MD     Number of Visits Requested:   1    traMADoL (Ultram) 50 mg tablet     Sig: Take 1 Tablet by mouth every eight (8) hours as needed for Pain for up to 7 days. Max Daily Amount: 150 mg. Dispense:  21 Tablet     Refill:  0    baclofen (LIORESAL) 20 mg tablet     Sig: Take 1 Tablet by mouth two (2) times a day for 90 days. Dispense:  60 Tablet     Refill:  2    diclofenac (VOLTAREN) 1 % gel     Sig: Apply 2 g to affected area four (4) times daily. Dispense:  100 g     Refill:  2    lidocaine (LIDODERM) 5 %     Si Patch by TransDERmal route every twenty-four (24) hours. Apply patch to the affected area for 12 hours a day and remove for 12 hours a day. Dispense:  30 Each     Refill:  1     call if any problems  There are no Patient Instructions on file for this visit. Follow-up and Dispositions    Return in about 2 months (around 2022).

## 2022-10-12 NOTE — Clinical Note
10/12/2022 12:11 PM     [place] [address] [id number]              Sincerely,      Hanna Restrepo MD

## 2022-10-12 NOTE — PROGRESS NOTES
Chief Complaint   Patient presents with    Follow-up     Visit Vitals  /80 (BP 1 Location: Right upper arm, BP Patient Position: Sitting, BP Cuff Size: Large adult)   Pulse 80   Resp 12   Ht 5' 10\" (1.778 m)   Wt 243 lb (110.2 kg)   SpO2 98%   BMI 34.87 kg/m²     1. \"Have you been to the ER, urgent care clinic since your last visit? Hospitalized since your last visit? \" No    2. \"Have you seen or consulted any other health care providers outside of the [address] [place] [address] since your last visit? \" No     3. For patients aged 39-70: Has the patient had a colonoscopy / FIT/ Cologuard? Yes - Care Gap present. Rooming MA/LPN to request most recent resultsPt states Cologuard done last year       If the patient is female:    4. For patients aged 41-77: Has the patient had a mammogram within the past 2 years? NA - based on age or sex      11. For patients aged 21-65: Has the patient had a pap smear?  NA - based on age or sex

## 2022-10-12 NOTE — LETTER
CONTROLLED SUBSTANCE MEDICATION AGREEMENT  Patient Name: Deisy Ring  Patient YOB: 1960     I understand, that controlled substance medications may be used to help better manage my symptoms and to improve my ability to function at home, work and in social settings. However, I also understand that these medications do have risks, which have been discussed with me, including possible development of physical or psychological dependence. I understand that successful treatment requires mutual trust and honesty between me and my provider. I understand and agree that following this Medication Agreement is necessary in continuing my provider-patient relationship and the success of my treatment plan. Explanation from my Provider: Benefits and Goals of Controlled Substance Medications: There are two potential goals for your treatment: (1) decreased pain and suffering (2) improved daily life functions. There are many possible treatments for your chronic condition(s). Alternatives such as physical therapy, yoga, massage, home daily exercise, meditation, relaxation techniques, injections, chiropractic manipulations, surgery, cognitive therapy, hypnosis and many medications that are not habit-forming may be used. Use of controlled substance medications may be helpful, but they are unlikely to resolve all symptoms or restore all function. Explanation from my Provider: Risks of Controlled Substance Medications:   Opioid pain medications: These medications can lead to problems such as addiction/dependence, sedation, lightheadedness/dizziness, memory issues, falls, constipation, nausea, or vomiting. They may also impair the ability to drive or operate machinery. Additionally, these medications may lower testosterone levels, leading to loss of bone strength, stamina and sex drive.   They may cause problems with breathing, sleep apnea and reduced coughing, which is especially dangerous for patients with lung disease. Overdose or dangerous interactions with alcohol and other medications may occur, leading to death. Hyperalgesia may develop, which means patients receiving opioids for the treatment of pain may become more sensitive to certain painful stimuli, and in some cases, experience pain from ordinarily non-painful stimuli. Women between the ages of 14-53 who could become pregnant should carefully weigh the risks and benefits of opioids with their physicians, as these medications increase the risk of pregnancy complications, including miscarriage,  delivery and stillbirth. It is also possible for babies to be born addicted to opioids. Opioid dependence withdrawal symptoms may include; feelings of uneasiness, increased pain, irritability, belly pain, diarrhea, sweats and goose-flesh. Testosterone replacement therapy:  Potential side effects include increased risk of stroke and heart attack, blood clots, increased blood pressure, increased cholesterol, enlarged prostate, sleep apnea, irritability/aggression and other mood disorders, and decreased fertility. Omari Brandt (1960)             Page 1 of 4    Initials:_______    Benzodiazepines and non-benzodiazepine sleep medications: These medications can lead to problems such as addiction/dependence, sedation, fatigue, lightheadedness, dizziness, incoordination, falls, depression, hallucinations, and impaired judgment, memory and concentration. The ability to drive and operate machinery may also be affected. Abnormal sleep-related behaviors have been reported, including sleepwalking, driving, making telephone calls, eating, or having sex while not fully awake. These medications can suppress breathing and worsen sleep apnea, particularly when combined with alcohol or other sedating medications, potentially leading to death. Dependence withdrawal symptoms may include tremors, anxiety, hallucinations and seizures.    Stimulants:  Common adverse effects include addiction/dependence, increased blood pressure and heart rate, decreased appetite, nausea, involuntary weight loss, insomnia,  irritability, and headaches. These risks may increase when these medications are combined with other stimulants, such as caffeine pills or energy drinks, certain weight loss supplements and oral decongestants. Dependence withdrawal symptoms may include depressed mood, loss of interest, suicidal thoughts, anxiety, fatigue, appetite changes and agitation. I agree and understand that I and my prescriber have the following rights and responsibilities regarding my treatment plan:   1. MY RIGHTS:  To be informed of my treatment and medication plan. To be an active participant in my health and wellbeing. 2. MY RESPONSIBILITY AND UNDERSTANDING FOR USE OF MEDICATIONS   I will take medications at the dose and frequency as directed. For my safety, I will not increase or change how I take my medications without the recommendation of my healthcare provider.  I will actively participate in any program recommended by my provider which may improve function, including social, physical, psychological programs.  I will not take my medications with alcohol or other drugs not prescribed to me. I understand that drinking alcohol with my medications increases the chances of side effects, including reduced breathing rate and could lead to personal injury when operating machinery.  I understand that if I have a history of substance use disorders, including alcohol or other illicit drugs, that I may be at increased risk of addiction to my medications.  I agree to notify my provider immediately if I should become pregnant so that my treatment plan can be adjusted.    I agree and understand that I shall only receive controlled substance medications from the prescriber that signed this agreement unless there is written agreement among other prescribers of controlled substances outlining the responsibility of the medications being prescribed.  I understand that the if the controlled medication is not helping to achieve goals, the dosage may be tapered and no longer prescribed. 3. MY RESPONSIBILITY FOR COMMUNICATION / PRESCRIPTION RENEWALS   I agree that all controlled substance medications that I take will be prescribed only by my provider. If another healthcare provider prescribes me medication in an emergency, I will notify my provider within seventy-two (72) hours. Daisy Hammonds (1960)             Page 2 of 4    Initials:_______  Eileen Karla I will arrange for refills at the prescribed interval ONLY during regular office hours. I will not ask for refills earlier than agreed, after-hours, on holidays or weekends. Refills may take up to 72 hours for processing and prescriptions to reach the pharmacy.  I will inform my other health care providers that I am taking these medications and of the existence of this [id number]. In the event of an emergency, I will provide the same information to the emergency department prescribers.  I will keep my provider updated on the pharmacy I am using for controlled medication prescription filling. 4. MY RESPONSIBILITY FOR PROTECTING MEDICATIONS   I will protect my prescriptions and medications. I understand that lost or misplaced prescriptions will not be replaced.  I will keep medications only for my own use and will not share them with others. I will keep all medications away from children.  I agree that if my medications are adjusted or discontinued, I will properly dispose of any remaining medications. I understand that I will be required to dispose of any remaining controlled medications as, directed by my prescriber, prior to being provided with any prescriptions for other controlled medications.   Medication drop box locations can be found at: [email or web address]  5. MY RESPONSIBILITY WITH ILLEGAL DRUGS    I will not use illegal or street drugs or another person's prescription medications not prescribed to me.  If there are identified addiction type symptoms, then referral to a program may be provided by my provider and I agree to follow through with this recommendation. 6. MY RESPONSIBILITY FOR COOPERATION WITH INVESTIGATIONS   I understand that my provider will comply with any applicable law and may discuss my use and/or possible misuse/abuse of controlled substances and alcohol, as appropriate, with any health care provider involved in my care, pharmacist, or legal authority.  I authorize my provider and pharmacy to cooperate fully with law enforcement agencies (as permitted by law) in the investigation of any possible misuse, sale, or other diversion of my controlled substances.  I agree to waive any applicable privilege or right of privacy or confidentiality with respect to these authorizations. 7. PROVIDERS RIGHT TO MONITOR FOR SAFETY: PRESCRIPTION MONITORING / DRUG TESTING   I consent to drug/toxicology screening and will submit to a drug screen upon my providers request to assure I am only taking the prescribed drugs for my safety monitoring. I understand that a drug screen is a laboratory test in which a sample of my urine, blood or saliva is checked to see what drugs I have been taking. This may entail an observed urine specimen, which means that a nurse or other health care provider may watch me provide urine, and I will cooperate if I am asked to provide an observed specimen. Silvana Lee (1960)             Page 3 of 4    Initials:_______  [place] I understand that my provider will check a copy of my State Prescription Monitoring Program () Report in order to safely prescribe medications.      Pill Counts: I consent to pill counts when requested. I may be asked to bring all my prescribed controlled substance medications, in their original bottles, to all of my scheduled appointments. In addition, my provider may ask me to come to the practice at any time for a random pill count. 8. TERMINATION OF THIS AGREEMENT   For my safety, my prescriber has the right to stop prescribing controlled substance medications and may end this agreement.  Conditions that may result in termination of this agreement:  a. I do not show any improvement in pain, or my activity has not improved. b. I develop rapid tolerance or loss of improvement, as described in my treatment plan.  c. I develop significant side effects from the medication. d. My behavior is not consistent with the responsibilities outlined above, thereby causing safety concerns to continue prescribing controlled substance medications. e. I fail to follow the terms of this agreement. f. Other:____________________________     UNDERSTANDING THIS MEDICATION AGREEMENT:    I have read the above and have had all my questions answered. For chronic disease management, I know that my symptoms can be managed with many types of treatments. A chronic medication trial may be part of my treatment, but I must be an active participant in my care. Medication therapy is only one part of my symptom management plan. In some cases, there may be limited scientific evidence to support the chronic use of certain medications to improve symptoms and daily function. Furthermore, in certain circumstances, there may be scientific information that suggests that the use of chronic controlled substances may worsen my symptoms and increase my risk of unintentional death directly related to this medication therapy. I know that if my provider feels my risk from controlled medications is greater than my benefit, I will have my controlled substance medication(s) compassionately lowered or removed altogether. I further agree to allow this office to contact my HIPAA contact if there are concerns about my safety and use of the controlled medications. I have agreed to use the prescribed controlled substance medications to me as instructed by my provider and as stated in this Medication Agreement. My initial on each page and my signature below shows that I have read each page and I have had the opportunity to ask questions with answers provided by my provider.       Patient Name (Printed): _____________________________________    Patient Signature:  ______________________   Date: _____________      Prescriber Name (Printed): ___________________________________    Prescriber Signature: _____________________  Date: _____________     Thiago Gtz (1960)             Page 4 of 4

## 2022-10-19 PROBLEM — N18.2 CHRONIC KIDNEY DISEASE (CKD), ACTIVE MEDICAL MANAGEMENT WITHOUT DIALYSIS, STAGE 2 (MILD): Status: ACTIVE | Noted: 2022-10-19

## 2022-10-19 PROBLEM — G89.4 CHRONIC PAIN SYNDROME: Status: ACTIVE | Noted: 2022-10-19

## 2022-10-19 PROBLEM — M67.911 DISORDER OF ROTATOR CUFF, RIGHT: Status: ACTIVE | Noted: 2022-10-19

## 2022-10-21 LAB
25(OH)D3+25(OH)D2 SERPL-MCNC: 18.3 NG/ML (ref 30–100)
DRUGS UR: NORMAL

## 2022-11-07 ENCOUNTER — TRANSCRIBE ORDER (OUTPATIENT)
Dept: SCHEDULING | Age: 62
End: 2022-11-07

## 2022-11-07 DIAGNOSIS — M54.16 LUMBAR BACK PAIN WITH RADICULOPATHY AFFECTING LEFT LOWER EXTREMITY: Primary | ICD-10-CM

## 2022-11-13 DIAGNOSIS — G89.4 CHRONIC PAIN SYNDROME: ICD-10-CM

## 2022-11-13 RX ORDER — TRAMADOL HYDROCHLORIDE 50 MG/1
50 TABLET ORAL
Qty: 90 TABLET | Refills: 1 | Status: SHIPPED | OUTPATIENT
Start: 2022-11-13 | End: 2023-01-12

## 2022-11-14 NOTE — PROGRESS NOTES
Please let him know urine drug test appropriately positive for tramadol, sending refill, please ask him to go by the office to sign pain agreement. Vitamin D is low, to take daily 5,000 units otc. We recheck level in 6 months.

## 2022-11-29 ENCOUNTER — TELEPHONE (OUTPATIENT)
Dept: INTERNAL MEDICINE CLINIC | Age: 62
End: 2022-11-29

## 2022-11-29 NOTE — TELEPHONE ENCOUNTER
Hospital called the peer to peer for patients MRI was suppose to be 11/11/22 but did not happen. Patient's MRI has been r/s again to 12/13/22. Peer to Peer with Jacoby Hong [phone] Case # [id number].

## 2022-12-09 ENCOUNTER — TELEPHONE (OUTPATIENT)
Dept: INTERNAL MEDICINE CLINIC | Age: 62
End: 2022-12-09

## 2022-12-13 ENCOUNTER — HOSPITAL ENCOUNTER (OUTPATIENT)
Dept: MRI IMAGING | Age: 62
End: 2022-12-13
Attending: INTERNAL MEDICINE
Payer: COMMERCIAL

## 2022-12-13 ENCOUNTER — HOSPITAL ENCOUNTER (OUTPATIENT)
Dept: MRI IMAGING | Age: 62
Discharge: HOME | End: 2022-12-13
Attending: ORTHOPAEDIC SURGERY
Payer: COMMERCIAL

## 2022-12-13 DIAGNOSIS — M67.911 DISORDER OF ROTATOR CUFF, RIGHT: ICD-10-CM

## 2022-12-13 DIAGNOSIS — M67.911 DISORDER OF ROTATOR CUFF, RIGHT: Primary | ICD-10-CM

## 2022-12-13 DIAGNOSIS — M54.16 LUMBAR BACK PAIN WITH RADICULOPATHY AFFECTING LEFT LOWER EXTREMITY: ICD-10-CM

## 2022-12-13 PROCEDURE — 72148 MRI LUMBAR SPINE W/O DYE: CPT

## 2022-12-13 PROCEDURE — 73221 MRI JOINT UPR EXTREM W/O DYE: CPT

## 2022-12-13 NOTE — TELEPHONE ENCOUNTER
Alaina called from [facility] and stated that the MRI that has been ordered will need a peer to peer from [facility]. They stated that the reason that this needs a peer to peer is because no x-ray was ordered first. The case number for this is [id number]. I spoke with Alaina and her number is [phone].
I put the order in for the patient and called the patient to make him aware. He did not answer but I did leave him a detailed VM.
no murmur/regular rate and rhythm

## 2022-12-15 ENCOUNTER — TELEPHONE (OUTPATIENT)
Dept: INTERNAL MEDICINE CLINIC | Age: 62
End: 2022-12-15

## 2022-12-20 ENCOUNTER — OFFICE VISIT (OUTPATIENT)
Dept: INTERNAL MEDICINE CLINIC | Age: 62
End: 2022-12-20
Payer: COMMERCIAL

## 2022-12-20 VITALS
WEIGHT: 245 LBS | OXYGEN SATURATION: 97 % | RESPIRATION RATE: 20 BRPM | HEIGHT: 70 IN | TEMPERATURE: 97.3 F | BODY MASS INDEX: 35.07 KG/M2 | HEART RATE: 73 BPM | SYSTOLIC BLOOD PRESSURE: 132 MMHG | DIASTOLIC BLOOD PRESSURE: 88 MMHG

## 2022-12-20 DIAGNOSIS — M19.211 OTHER SECONDARY OSTEOARTHRITIS OF RIGHT SHOULDER: ICD-10-CM

## 2022-12-20 DIAGNOSIS — N18.2 CHRONIC KIDNEY DISEASE (CKD), ACTIVE MEDICAL MANAGEMENT WITHOUT DIALYSIS, STAGE 2 (MILD): ICD-10-CM

## 2022-12-20 DIAGNOSIS — E55.9 VITAMIN D DEFICIENCY: ICD-10-CM

## 2022-12-20 DIAGNOSIS — M75.101 TEAR OF RIGHT SUPRASPINATUS TENDON: Primary | ICD-10-CM

## 2022-12-20 DIAGNOSIS — M67.813 BICEPS TENDINOSIS OF RIGHT SHOULDER: ICD-10-CM

## 2022-12-20 DIAGNOSIS — S46.811D PARTIAL TEAR OF RIGHT SUBSCAPULARIS TENDON, SUBSEQUENT ENCOUNTER: ICD-10-CM

## 2022-12-20 DIAGNOSIS — M12.211: ICD-10-CM

## 2022-12-20 DIAGNOSIS — Z51.81 MEDICATION MONITORING ENCOUNTER: ICD-10-CM

## 2022-12-20 PROCEDURE — 99214 OFFICE O/P EST MOD 30 MIN: CPT | Performed by: INTERNAL MEDICINE

## 2022-12-20 RX ORDER — CHOLECALCIFEROL (VITAMIN D3) 125 MCG
5000 CAPSULE ORAL DAILY
Qty: 90 CAPSULE | Refills: 1 | Status: SHIPPED | OUTPATIENT
Start: 2022-12-20 | End: 2023-06-18

## 2022-12-20 NOTE — PROGRESS NOTES
Elin Cushing is a 65 y.o. male and presents with Follow-up (Discuss MRI results-check right shoulder)    Arie Dawkins is here for follow up and right shoulder MRI review. He still can minimally move his shoulder, MRI shows complete tear of supraspinatous tendon and partial tear of subescapularis, severe OA of the shoulder. He has not yet made appointment with Dr. Kayden Ramirez. He is taking the tramadol as needed, he tries not to take more than once or twice a day, he says  pain decreases a little bit not a lot. Currently smoking a pack a week    Reviewed labs from October, he has not started new dose of vitamin D, will send prescription. Time to recheck renal function     Review of Systems  Review of Systems   Constitutional:  Negative for chills, fatigue, fever and unexpected weight change. HENT:  Negative for congestion, ear pain, sneezing and sore throat. Eyes:  Negative for pain and discharge. Respiratory:  Negative for cough, shortness of breath and wheezing. Cardiovascular:  Negative for chest pain, palpitations and leg swelling. Gastrointestinal:  Negative for abdominal pain, blood in stool, constipation and diarrhea. Endocrine: Negative for polydipsia and polyuria. Genitourinary:  Negative for difficulty urinating, dysuria, frequency, hematuria and urgency. Musculoskeletal:  Negative for arthralgias, back pain and joint swelling. Skin:  Negative for rash. Allergic/Immunologic: Negative for environmental allergies and food allergies. Neurological:  Negative for dizziness, speech difficulty, weakness, light-headedness, numbness and headaches. Hematological:  Negative for adenopathy. Psychiatric/Behavioral:  Negative for behavioral problems (Depression), sleep disturbance and suicidal ideas.          Past Medical History:   Diagnosis Date    Arthritis     Chronic pain     back    Gout     Hyperlipidemia     Patient stated he was on medication     Lumbar radiculopathy     Psychiatric disorder     depression     Spinal stenosis      Past Surgical History:   Procedure Laterality Date    HX COLONOSCOPY      HX HERNIA REPAIR      twice    HX ORTHOPAEDIC      right hand trigger finger release      Social History     Socioeconomic History    Marital status:    Tobacco Use    Smoking status: Some Days     Packs/day: 0.25     Years: 30.00     Pack years: 7.50     Types: Cigarettes    Smokeless tobacco: Never    Tobacco comments:     pt states 3 times a week   Vaping Use    Vaping Use: Never used   Substance and Sexual Activity    Alcohol use: Yes     Comment: occasionally    Drug use: Never    Sexual activity: Yes     Partners: Female     Social Determinants of Health     Financial Resource Strain: High Risk    Difficulty of Paying Living Expenses: Very hard   Food Insecurity: No Food Insecurity    Worried About Running Out of Food in the Last Year: Never true    Ran Out of Food in the Last Year: Never true     Family History   Problem Relation Age of Onset    Cancer Mother     Diabetes Sister     Heart Disease Brother     Alcohol abuse Father      Current Outpatient Medications   Medication Sig Dispense Refill    cholecalciferol (VITAMIN D3) (5000 Units /125 mcg) capsule Take 1 Capsule by mouth daily for 180 days. 90 Capsule 1    traMADoL (Ultram) 50 mg tablet Take 1 Tablet by mouth every eight (8) hours as needed for Pain for up to 60 days. Max Daily Amount: 150 mg. 90 Tablet 1    baclofen (LIORESAL) 20 mg tablet Take 1 Tablet by mouth two (2) times a day for 90 days. 60 Tablet 2    diclofenac (VOLTAREN) 1 % gel Apply 2 g to affected area four (4) times daily. 100 g 2    lidocaine (LIDODERM) 5 % 1 Patch by TransDERmal route every twenty-four (24) hours. Apply patch to the affected area for 12 hours a day and remove for 12 hours a day.  30 Each 1    gabapentin (NEURONTIN) 300 mg capsule take 1 capsule by mouth three times a day **DAILY MAX: 3 CAPSULES** 90 Capsule 3    atorvastatin (LIPITOR) 10 mg tablet Take 1 Tablet by mouth nightly. 90 Tablet 1    cetirizine (ZYRTEC) 10 mg tablet Take 1 Tablet by mouth daily. 90 Tablet 1    fluticasone propionate (FLONASE) 50 mcg/actuation nasal spray 2 Sprays by Both Nostrils route daily. 1 Each 2    albuterol (PROVENTIL HFA, VENTOLIN HFA, PROAIR HFA) 90 mcg/actuation inhaler Take 2 Puffs by inhalation every four (4) hours as needed for Wheezing. 18 g 3    montelukast (SINGULAIR) 10 mg tablet Take 1 Tablet by mouth daily. 90 Tablet 1     Allergies   Allergen Reactions    Fish Containing Products Nausea and Vomiting     Patient stated baked fish only patient stated can eat grilled fish         Objective:  Visit Vitals  /88 (BP 1 Location: Left upper arm, BP Patient Position: Sitting, BP Cuff Size: Adult)   Pulse 73   Temp 97.3 °F (36.3 °C) (Temporal)   Resp 20   Ht 5' 10\" (1.778 m)   Wt 245 lb (111.1 kg)   SpO2 97%   BMI 35.15 kg/m²     Physical Exam:   Physical Exam  Constitutional:       General: He is not in acute distress. Appearance: Normal appearance. He is obese. HENT:      Head: Normocephalic and atraumatic. Mouth/Throat:      Mouth: Mucous membranes are moist.   Eyes:      Extraocular Movements: Extraocular movements intact. Conjunctiva/sclera: Conjunctivae normal.      Pupils: Pupils are equal, round, and reactive to light. Cardiovascular:      Rate and Rhythm: Normal rate and regular rhythm. Pulses: Normal pulses. Heart sounds: Normal heart sounds. Pulmonary:      Effort: Pulmonary effort is normal.      Breath sounds: Normal breath sounds. Abdominal:      General: Abdomen is flat. Bowel sounds are normal. There is no distension. Palpations: Abdomen is soft. There is no mass. Tenderness: There is no abdominal tenderness. Musculoskeletal:         General: No swelling or deformity. Right shoulder: Tenderness and crepitus present. Decreased range of motion. Decreased strength.       Cervical back: Normal range of motion and neck supple. Right lower leg: No edema. Left lower leg: No edema. Lymphadenopathy:      Cervical: No cervical adenopathy. Skin:     General: Skin is warm and dry. Capillary Refill: Capillary refill takes less than 2 seconds. Coloration: Skin is not jaundiced or pale. Findings: No erythema or rash. Neurological:      General: No focal deficit present. Mental Status: He is alert and oriented to person, place, and time. Psychiatric:         Mood and Affect: Mood normal.         Behavior: Behavior normal.         Thought Content: Thought content normal.         Judgment: Judgment normal.        Results for orders placed or performed in visit on 10/28/22   COLOGUARD TEST, EXTERNAL   Result Value Ref Range    Cologuard Test, External Negative        Assessment/Plan:    He has severe damage of his right shoulder joint and rotator cuff which explains the marked functional limitation with his right upper extremity. At this moment is hard for him to perform his ADLs, including getting dressed, he says he has to lean towards the wall and help himself with body movements to be able to get dressed and undressed. he can continue taking tramadol for pain control, no NSAIDs since he has CKD stage 2. Encouraged to call now and make appointment with orthopedics Dr. Arjun Escobar. All other plan below       ICD-10-CM ICD-9-CM    1. Tear of right supraspinatus tendon  M75.101 840.6       2. Partial tear of right subscapularis tendon, subsequent encounter  S46.811D V58.89      840.5       3. Biceps tendinosis of right shoulder  M67.813 726.10       4. Villonodular synovitis involving glenohumeral joint, right  M12.211 719.21       5. Other secondary osteoarthritis of right shoulder  M19.211 715.21       6. Vitamin D deficiency  E55.9 268.9 cholecalciferol (VITAMIN D3) (5000 Units /125 mcg) capsule      7. Medication monitoring encounter  Z51.81 V58.83 TOXASSURE SELECT 13 (MW)      8. Chronic kidney disease (CKD), active medical management without dialysis, stage 2 (mild)  N18.2 585.2 MICROALBUMIN, UR, RAND W/ MICROALB/CREAT RATIO      METABOLIC PANEL, COMPREHENSIVE        Orders Placed This Encounter    MICROALBUMIN, UR, RAND W/ MICROALB/CREAT RATIO    METABOLIC PANEL, COMPREHENSIVE    TOXASSURE SELECT 13 (MW)     Order Specific Question:   Patient Rx Info S-Z (Choose Trade or Generic, not both)     Answer:   Tramadol    cholecalciferol (VITAMIN D3) (5000 Units /125 mcg) capsule     Sig: Take 1 Capsule by mouth daily for 180 days. Dispense:  90 Capsule     Refill:  1     lose weight, follow low fat diet, follow low salt diet, routine labs ordered, call if any problems  There are no Patient Instructions on file for this visit. Follow-up and Dispositions    Return in about 3 months (around 3/20/2023).

## 2022-12-20 NOTE — PROGRESS NOTES
Chief Complaint   Patient presents with    Follow-up     Discuss MRI results-check right shoulder    Visit Vitals  BP (!) 142/87 (BP 1 Location: Left upper arm, BP Patient Position: Sitting, BP Cuff Size: Large adult) Comment: bp recheck   Pulse 73   Temp 97.3 °F (36.3 °C) (Temporal)   Resp 20   Ht 5' 10\" (1.778 m)   Wt 245 lb (111.1 kg)   SpO2 97%   BMI 35.15 kg/m²    1. Have you been to the ER, urgent care clinic since your last visit? Hospitalized since your last visit? No    2. Have you seen or consulted any other health care providers outside of the [address] [place] [address] since your last visit? Include any pap smears or colon screening.  No

## 2023-01-19 ENCOUNTER — HOSPITAL ENCOUNTER (OUTPATIENT)
Dept: PHYSICAL THERAPY | Age: 63
Discharge: HOME | End: 2023-01-19
Payer: COMMERCIAL

## 2023-01-19 PROCEDURE — 97162 PT EVAL MOD COMPLEX 30 MIN: CPT

## 2023-01-19 NOTE — THERAPY EVALUATION
[address] Suite [facility], [address] [place] [address]  Ph: [phone]    Fax: [phone]    Initial Evaluation/Plan of Care/Statement of Necessity for Physical Therapy Services     Patient name: Altaf Euceda    Date/Start of Care:2023  : 1960  [x]  Patient  Verified  Provider#: [id number]          Referral source: Zohreh Nice MD    Return visit to MD: 23 shoulder orthopedic/ Dr. Amarjit Soriano for his lower back 3/2022      Medical/Treatment Diagnosis: Pain in right shoulder [M25.511]  Complete rotator cuff tear or rupture of right shoulder, not specified as traumatic [M75.121]  Radiculopathy, lumbar region [M54.16]  Other specified postprocedural states [Z98.890]    Payor: [address] [place] [address] Road / Plan: [address] / Product Type: Managed Care Medicaid /       Prior Hospitalization: see medical history     Comorbidities: see chart   Prior Level of Function: independent no device   Medications: Verified on Patient Summary List          Patient / Family readiness to learn indicated by: asking questions, trying to perform skills, and interest  Persons(s) to be included in education: patient (P)  Barriers to Learning/Limitations: no   Patient Self Reported Health Status: fair  Rehabilitation Potential: fair  Previous Treatment/Compliance: for back   PMHx/Surgical Hx: back surgery   Work Hx: not working at this time  Living Situation: Patient lives family in a house with no stairs to enter and everything in on the main level.    Barriers to progress: chronicity and acuity of pain in both shoulder and back  Motivation: good  Substance use: N/A  Cognition: A & O x 4   Onset Date: shoulder ~3  years ago, back surgery 2021    SUBJECTIVE  Patient was referred to PT due to R shoulder pain atraumatic PHILLIP and L side back pain that started right after he had surgery on 2021, pt stated is left lower back hurts and his R leg feels weak. He did not have therapy after his back surgery. Pt stated he had MRI done for his R shoulder which confirmed Full thickness, full width supraspinatus tendon tear extending to the infraspinatus, with retraction. Additional low-grade tears of supraspinatus and infraspinatus as above. Moderate grade interstitial tear of the superior subscapularis tendon. Fraying of the posterior and inferior glenoid labrum. Marked long head biceps tendinosis, which subluxes into the subscapularis tendon tear and  severe acromioclavicular and mild glenohumeral osteoarthritis. He also had imaging to his lower back 11/2022 which showed Interval hemilaminectomy on the left at L4-L5. Other canal stenosis and mild left foraminal stenosis at L3-L4 not significantly changed. Severe right and moderate left foraminal stenosis at L4-5 and canal stenosis at L4-5 is improved. Pt stated the R shoulder is hurting more then his back. The shoulder started hurting about 3 years ago, but he was not paying much attention to it and he had a back laminectomy on 12/2021 where they fixed the R side of his lower back. In the shoulder patient reports pain running from the front of shoulder joint to the back of it, and in the lumbar the pain in on the right and side of his lower back and been running across to his left side. He reports no radicular symptoms . In the R shoulder the pain is sharp and constant and in the lumbar its dull and he reports no numbness/tingling sensation. Pt stated he has pain sleeping on the R side and on his back and mostly sleeps on his left side. Activities that produce pain in the shoulder are: raising the arm, laying on R side, driving,lifting and getting dress. He had an injection 2 weeks ago and pain meds help some but not much. In the lumbar the activities that produce the pain are sitting too long> 10  min, standing from a chair, prolong standing and getting dress.  He also uses icy hot for both shoulder/back and they help slight. Patient reports functional limitation with getting dress, doing his ADL's (he uses his left hand mostly) , driving, lifting, picking up things from the ground, bending, sleeping and prolong sitting. Mechanism of Injury/History of Complaint: shoulder -unknown PHILLIP may need surgery -In the back-  pain started post surgery    Area of pain: shoulder and back    Pain Descriptors:  sharp, achyness and stiffness. Pain Level (0-10 scale)  At rest: shoulder 8/10  back 5/10  With activity: shoulder 10/10 and back 8/10    Worst: shoulder 10+ /10 and back 9/10     Least: 8/10  back 5/10  Goal: \" To strengthen the muscle in shoulder and back and be able to have more flexibility\". Objective/Functional Measures including ROM/MMT:   Physical Findings   Ortho:   Posture: flexed forwards head, rounded shoulder   Palpation: TTP on supraspinatus, infraspinatus fossa, R UT, TTP around scalens   Gross findings:  R handed   Specific joints: *normal values in ()    SHOULDER                                         AROM   PROM            MMT   R L R L R L   Flexion (180) 35 p! 111 35p! Guarded  120 2+p! 4   Extension (60)         Abduction (180) 40p! 98 40p! guarded 110  2+p! 4   Adduction (0)         IR (70) Shoulder at 40 abd 20p! Guarding     3-p! 4   ER (90) Shoulder at 40 abd   10p! Guarding     3-p! 4   Horizontal Abduction (130)         Horizontal Adduction (45)         Additional comments: PROM on Left shoulder resistance and tightness   Unable to assess PROM on R LE due to empty end feel    ELBOW                             AROM                             PROM                             MMT   R L R L R L   Flexion (150) 130p!  At end range     4-p! 4   Extension (0)     4-p! 4   Additional comments:         SPINE:   CERVICAL                                                ROM                                  Strength   Flexion  60    Extension 20    Protraction No limiations     Retraction Limited and c/o pain R L R L   Lateral Flexion (45) 25 15p! Rotation (90) 45 45p! Additional comments:     Strength: NT  Special Tests:    Cervical   Neurological: Reflexes / Sensations: intact to light touch on BUE  Shoulder  Joint Mobility Assessment: Glenohumeral: TTP limited GHJ retraction       Acromioclavicular:  slight tender to palpations with A/P glides      Sternoclavicular: WNL    Special Tests: unable to assess further speical test on R shoulder due to pain with A/PROM   (+) shoulder ER test    Physical Findings   Ortho:   Posture: Narrow ZEYAD, increased lumbar lordosis   Gait and Functional Mobility:  slow gait   Palpation: TTP R PSIS, weak hamstrings, tight calves   Gross findings:  weakness throughout   Swelling: none noted   Spine:   TRUNK ROM:     Flexion:                         [] N/A  []WNL  []Minimal  [x]Moderate  [] Major 54p! Center of lower back and L/R side of back , gowars   Extension:                     [] N/A  []WNL  []Minimal  []Moderate  [x] Major  hypomobility pain from center to L side of lower back   Right Lateral Flexion:    [] N/A  []WNL  []Minimal  [x]Moderate  [] Major  60 cm from 3rd finger to floor  pain in L lower back   Left Lateral Flexion:   [] N/A  []WNL  []Minimal  [x]Moderate  [] Major 60 cm from 3rd finger to floor pain in left lower back  Rotation to the Right:  [] N/A  []WNL  [x]Minimal  []Moderate  [] Major   Rotation to the Left:   [] N/A  []WNL  [x]Minimal  []Moderate  [] Major     Additional comments: limited and pain with all movements   Specific joints: *normal values in ()    Hip      AROM       PROM             MMT   R L R L R L   Flexion (120) 90 90    4- 4p!    Extension (15)         Abduction (40)         Adduction (30)         IR (40)         ER (40)         Additional comments: reports back pain with MMT  C/o pain with laying flat on his back   PROM BLE limited tightenss, stiffnes   Hamstring 90-90 flexibility test R/L 50 deg     Knee          AROM PROM                       MMT   R L R L R L   Extension (0)      4- 4p! Flexion (145)     4- 4-   Additional comments: reports  back pain with MMT    Ankle                                 AROM                       PROM                             MMT   R L R L R L   Dorsiflexion (15)     4 4+   Plantarflexion (50)         Additional comments: reports feeling weak on R side - post surgery he did not have any therapy. Mobility Assessment: sitting, standing, banding     Gait and Functional Mobility:  Transfers:   Bed mobility: independnet with increased time and effort and pain in back and R shoulder, unable to lay on R side or prone   Sit to/from Supine: independent with increased time and effort  Sit to/from stands: independent with use of L UE on occations  Gait: slow decreased heel strike, push off BLEs and R arm swing  Assistive device:  none      Walk on heel - reports more balance issues  Walk on toes c/o pain across his back   Squat -1/2 squat , narrow ZEYAD c/o B knee pain       Sitting Balance: (unsupported)  Static:      [] Normal [x] Good [] Fair [] Poor  Dynamic: [] Normal [x] Good [] Fair [] Poor  Standing Balance:   Static:     [] Normal [] Good [x] Fair [] Poor  Dynamic: [] Normal [] Good [x] Fair [] Poor    Modified Clinical Test of Sensory Interaction (CTSIB-M):    Eyes open/firm surface: 2 min    Eyes closed/firm surface: NT     Tandem Stand: (eyes open/eyes closed)   R foot forward: 18 sec    L foot forward: 15 sec   C/o weakness in the legs and feeling off balance.     Single limb stand:   R: unable   L: unable       Patient reports functional limitations with:      Neurological: Reflexes / Sensations: intact to light touch   Special Tests:     Hip  Special Tests: Trendelenberg: (+) B     Lumbar  Special Tests:      FABERS: (+)R back pain         H.S. SLR: (-) but tight hamstrings   Five times sit to stand: unable to stand without UE support c/o excessive back pain required to use his L arm and was only able to do 2 reps. Normative averages:  Clients younger than 61years old  £  10 seconds = Normal   Clients older than 61years old £ 14.2 seconds = Normal   Change of ³ 2.3 seconds shows a significant clinical improvement    Nadira TORER. Reference values for the five repetition sit to stand test: a descriptive metaanalysis of data from elders. Percept Mot Skills 2006; 103(1):215-222. [facility] Score:     UE: 41.89%  LE: 43.72%   ASSESSMENT/Changes in Function:   Patient was referred to PT with dx of R shoulder RCT and lower back pain with radiculopathy, MRI in the shoulder reveals a  supraspinatus/infraspinatus and subscapularis tendon involvement /tears and lumbar stenosis. Pt currently repots more R shoulder pain vs back, stated he is following up with his orthopedic next week to discusses surgery. Pt presents with shoulder and lumbar decreased A/PROM, pain with all joint movements, decreased UE/LE strength, weak core, decreased flexibility, limited with bed mobility, limited with tranfers, limited ability to do ADLs, decreased balance and gait impairments. Problem List/Impairments: pain affecting function, decrease ROM, decrease strength, edema affecting function, impaired gait/ balance, decrease ADL/ functional abilitiies, decrease activity tolerance, decrease flexibility/ joint mobility, and decrease transfer abilities  Treatment Plan may include any combination of the following: Therapeutic exercise, Neuromuscular reeducation, Manual therapy, Therapeutic activity, Electric stim unattended , Gait training, Ultrasound, and Electric stim attended  Patient/ Caregiver education and instruction: exercises  Frequency / Duration: Patient to be seen 2 times per week for 24-30 treatments. Certification Period: 1/19/23-4/19/23  Patient Goal (s): \" To strengthen the muscle in shoulder and back and be able to have more flexibility\". [] Met [] Not met [] Partially met   Short Term Goals:  To be accomplished in 8-10  treatments. Shoulder  1) Pt will be independent with HEP. [] Met [] Not met [] Partially met   2) Pt will use ice/heat/massage to assist with inflammation and pain management as instructed. [] Met [] Not met [] Partially met   3) Pt will use proper positioning for sleeping to help with pain/inflammation. [] Met [] Not met [] Partially met   4) Pt will be more aware of postural positioning in sitting and standing to protect the shoulder joint complex. [] Met [] Not met [] Partially met   5) Pt will gain 10-15 deg of active shoulder flexion/abduction to improve joint mobility      [] Met [] Not met [] Partially met   6) pt shoulder PROM flexion/abduction >120 deg and ER >45 deg to prevent frozen shoulder [] Met [] Not met [] Partially met   7) Pt pain level will decrease to <=5/10 with activity in both shoulder and back. [] Met [] Not met [] Partially met     Back   7) Pt will gain 5-8 deg of hamstring 90-90 flexibility test to improve mobility. [] Met [] Not met [] Partially met   8) Pt will gain 1/2 to 1 grade of strength in BLE to improve strength and be able to stand from chair with more ease. [] Met [] Not met [] Partially met     Long Term Goals: To be accomplished in 24-30 treatments. Shoulder     1) Patient will be able to raise his R UE to above 90 deg as tolerated. [] Met [] Not met [] Partially met     2) Pt will have improved AMPAC score by 5%. [] Met [] Not met [] Partially met     3) Pt will be able to sleep comfortably through the night without waking due to pain. [] Met [] Not met [] Partially met   4) Pt will be able to reach above shoulder level without increase of pain. [] Met [] Not met [] Partially met   5) Pt will be able to tuck in a shirt or reach behind back for showering without increase of pain. [] Met [] Not met [] Partially met     6) Pt will be able to drive and fasten seat belt without difficulty.       [] Met [] Not met [] Partially met   7) Pt will be able to tighten a jar lid/use a knife without difficulty. [] Met [] Not met [] Partially met          Back   1) Pt will have improved AMPAC score by 5%. [] Met [] Not met [] Partially met   2) Pt will have ROM/MMT WFL to improve joint function and core stability without pain. [] Met [] Not met [] Partially met   3) Pt will be able to sit greater than 45-60  minutes without pain. [] Met [] Not met [] Partially met   4) Pt will be able to stand greater than 30-45 minutes without increase of pain. [] Met [] Not met [] Partially met   5) Pt will be able to ambulate community distances without increase of pain or difficulty. [] Met [] Not met [] Partially met   6) Pt will be able to go up and down steps without difficulty or increased pain. [] Met [] Not met [] Partially met     7) Pt will be able to bend down to put on socks/shoes without difficulty. [] Met [] Not met [] Partially met     8) Pt will be able to retrieve items from the ground without pain. [] Met [] Not met [] Partially met       TODAY'S TREATMENT: EVALUATION completed                  EVALUATION COMPLEXITY (Low, Moderate, High): mod  Visit #: 1  In time:3:15   Out time:4:15   Total Treatment Time (min): 60   Total Timed Codes (min): 0 1:1 Treatment Time (MC only): 0   Pain Level (0-10 scale) pre treatment: shoulder 8/10  back 5/10  Pain Level (0-10 scale) post treatment: shoulder 9/10  back 6/10 post assessment    Decline modalities ice/heat stated he has another medical appointment . With   [] TE   [] TA   [] Neuro   [] SC   [] other: Patient Education: [] Review HEP    [] Progressed/Changed HEP based on:   [] positioning   [] body mechanics   [] transfers   [x] heat/ice application    [] other:        [x]  Plan of care has been reviewed with PTA. The Plan of Care is based on information from the initial evaluation.   Mildred Cole, PT, DPT    1/19/2023 ________________________________________________________________________    I certify that the above Therapy Services are being furnished while the patient is under my care. I agree with the treatment plan and certify that this therapy is necessary.     Physician's Signature:_________________________________________________  Date:____________Time: ____________     Abbi Colon MD

## 2023-03-31 ENCOUNTER — TELEPHONE (OUTPATIENT)
Dept: INTERNAL MEDICINE CLINIC | Age: 63
End: 2023-03-31

## 2023-03-31 NOTE — TELEPHONE ENCOUNTER
Patient called requesting a call from Dr. Pascale Moreno. Would not let me know any details regarding this matter. Would like to speak with you before his appt on 04/12/23.

## 2023-04-04 NOTE — TELEPHONE ENCOUNTER
I spoke to him, he just wanted to make me aware he dropped off papers for disability today for me to fill up, he has hearing for this at the end of this month.  He's applying due to difficulty with mobility due to lumbar spinal stenosis/radiculopathy and complete tear of right rotator cuff muscle · POA blood pressure of 145/64    Plan:  · Continue metoprolol tartrate 75 mg q 12 hrs  · Continue to monitor routine vital signs

## 2023-04-17 ENCOUNTER — TELEPHONE (OUTPATIENT)
Dept: INTERNAL MEDICINE CLINIC | Age: 63
End: 2023-04-17

## 2023-04-17 NOTE — TELEPHONE ENCOUNTER
Called pt regarding papers he had left to be filled out. Dr. Marcia Taobr had spoken to pt about these papers and she cannot fill them out. Pt is aware the orthopedic dr. Yesica Solares to complete this paper work. Pt has ask me to mail the papers to him.  Thank you

## 2023-04-20 LAB
ALBUMIN SERPL-MCNC: 4 G/DL (ref 3.8–4.8)
ALBUMIN/CREAT UR: 4 MG/G CREAT (ref 0–29)
ALBUMIN/GLOB SERPL: 1.3 {RATIO} (ref 1.2–2.2)
ALP SERPL-CCNC: 100 IU/L (ref 44–121)
ALT SERPL-CCNC: 14 IU/L (ref 0–44)
AST SERPL-CCNC: 16 IU/L (ref 0–40)
BILIRUB SERPL-MCNC: 0.3 MG/DL (ref 0–1.2)
BUN SERPL-MCNC: 12 MG/DL (ref 8–27)
BUN/CREAT SERPL: 9 (ref 10–24)
CALCIUM SERPL-MCNC: 9.1 MG/DL (ref 8.6–10.2)
CHLORIDE SERPL-SCNC: 112 MMOL/L (ref 96–106)
CO2 SERPL-SCNC: 22 MMOL/L (ref 20–29)
CREAT SERPL-MCNC: 1.3 MG/DL (ref 0.76–1.27)
CREAT UR-MCNC: 91.6 MG/DL
DRUGS UR: NORMAL
EGFRCR SERPLBLD CKD-EPI 2021: 63 ML/MIN/1.73
GLOBULIN SER CALC-MCNC: 3 G/DL (ref 1.5–4.5)
GLUCOSE SERPL-MCNC: 101 MG/DL (ref 70–99)
MICROALBUMIN UR-MCNC: 3.5 UG/ML
POTASSIUM SERPL-SCNC: 4.4 MMOL/L (ref 3.5–5.2)
PROT SERPL-MCNC: 7 G/DL (ref 6–8.5)
SODIUM SERPL-SCNC: 147 MMOL/L (ref 134–144)

## 2023-05-02 RX ORDER — MONTELUKAST SODIUM 10 MG/1
TABLET ORAL
Qty: 90 TABLET | Refills: 3 | Status: SHIPPED | OUTPATIENT
Start: 2023-05-02

## 2023-05-02 RX ORDER — LORATADINE 10 MG/1
TABLET ORAL
Qty: 90 TABLET | Refills: 3 | Status: SHIPPED | OUTPATIENT
Start: 2023-05-02

## 2023-05-21 RX ORDER — ONDANSETRON 4 MG/1
TABLET, FILM COATED ORAL
COMMUNITY
Start: 2023-03-09

## 2023-05-21 RX ORDER — LORATADINE 10 MG/1
1 TABLET ORAL DAILY
COMMUNITY
Start: 2023-05-02

## 2023-05-21 RX ORDER — GABAPENTIN 300 MG/1
CAPSULE ORAL
COMMUNITY
Start: 2022-10-01 | End: 2023-06-07

## 2023-05-21 RX ORDER — MONTELUKAST SODIUM 10 MG/1
1 TABLET ORAL DAILY
COMMUNITY
Start: 2023-05-02

## 2023-05-21 RX ORDER — ALBUTEROL SULFATE 90 UG/1
2 AEROSOL, METERED RESPIRATORY (INHALATION) EVERY 4 HOURS PRN
COMMUNITY
Start: 2022-04-06

## 2023-05-21 RX ORDER — ESCITALOPRAM OXALATE 10 MG/1
TABLET ORAL
COMMUNITY
Start: 2023-04-12 | End: 2023-07-25

## 2023-05-21 RX ORDER — OXYCODONE HYDROCHLORIDE 5 MG/1
TABLET ORAL
COMMUNITY
Start: 2023-04-01

## 2023-05-21 RX ORDER — LIDOCAINE 50 MG/G
1 PATCH TOPICAL EVERY 24 HOURS
COMMUNITY
Start: 2022-10-12

## 2023-05-21 RX ORDER — ATORVASTATIN CALCIUM 10 MG/1
1 TABLET, FILM COATED ORAL NIGHTLY
COMMUNITY
Start: 2023-04-12

## 2023-05-21 RX ORDER — PSEUDOEPHEDRINE HCL 30 MG
TABLET ORAL
COMMUNITY
Start: 2023-03-09

## 2023-05-21 RX ORDER — FLUTICASONE PROPIONATE 50 MCG
2 SPRAY, SUSPENSION (ML) NASAL DAILY
COMMUNITY
Start: 2022-04-06

## 2023-05-31 ENCOUNTER — HOSPITAL ENCOUNTER (OUTPATIENT)
Facility: HOSPITAL | Age: 63
Discharge: HOME | End: 2023-06-03
Payer: COMMERCIAL

## 2023-05-31 DIAGNOSIS — M25.812 IMPINGEMENT OF LEFT SHOULDER: ICD-10-CM

## 2023-05-31 DIAGNOSIS — M48.07 SPINAL STENOSIS, LUMBOSACRAL REGION: Primary | ICD-10-CM

## 2023-05-31 PROCEDURE — 73221 MRI JOINT UPR EXTREM W/O DYE: CPT

## 2023-06-07 RX ORDER — GABAPENTIN 300 MG/1
CAPSULE ORAL
Qty: 90 CAPSULE | Refills: 2 | Status: SHIPPED | OUTPATIENT
Start: 2023-06-07 | End: 2023-09-05

## 2023-07-25 ENCOUNTER — OFFICE VISIT (OUTPATIENT)
Facility: CLINIC | Age: 63
End: 2023-07-25
Payer: COMMERCIAL

## 2023-07-25 VITALS
SYSTOLIC BLOOD PRESSURE: 138 MMHG | HEIGHT: 72 IN | HEART RATE: 78 BPM | OXYGEN SATURATION: 98 % | WEIGHT: 244 LBS | BODY MASS INDEX: 33.05 KG/M2 | DIASTOLIC BLOOD PRESSURE: 82 MMHG | TEMPERATURE: 97.9 F | RESPIRATION RATE: 16 BRPM

## 2023-07-25 DIAGNOSIS — E78.2 MIXED HYPERLIPIDEMIA: ICD-10-CM

## 2023-07-25 DIAGNOSIS — E55.9 VITAMIN D DEFICIENCY, UNSPECIFIED: ICD-10-CM

## 2023-07-25 DIAGNOSIS — F32.A ANXIETY AND DEPRESSION: Primary | ICD-10-CM

## 2023-07-25 DIAGNOSIS — M48.07 SPINAL STENOSIS, LUMBOSACRAL REGION: ICD-10-CM

## 2023-07-25 DIAGNOSIS — G47.19 EXCESSIVE DAYTIME SLEEPINESS: ICD-10-CM

## 2023-07-25 DIAGNOSIS — F41.9 ANXIETY AND DEPRESSION: Primary | ICD-10-CM

## 2023-07-25 DIAGNOSIS — N18.2 CHRONIC KIDNEY DISEASE, STAGE 2 (MILD): ICD-10-CM

## 2023-07-25 DIAGNOSIS — R06.83 SNORING: ICD-10-CM

## 2023-07-25 PROCEDURE — 99214 OFFICE O/P EST MOD 30 MIN: CPT | Performed by: INTERNAL MEDICINE

## 2023-07-25 RX ORDER — ESCITALOPRAM OXALATE 20 MG/1
20 TABLET ORAL DAILY
Qty: 90 TABLET | Refills: 2 | Status: SHIPPED | OUTPATIENT
Start: 2023-07-25

## 2023-07-25 SDOH — ECONOMIC STABILITY: FOOD INSECURITY: WITHIN THE PAST 12 MONTHS, THE FOOD YOU BOUGHT JUST DIDN'T LAST AND YOU DIDN'T HAVE MONEY TO GET MORE.: NEVER TRUE

## 2023-07-25 SDOH — ECONOMIC STABILITY: INCOME INSECURITY: HOW HARD IS IT FOR YOU TO PAY FOR THE VERY BASICS LIKE FOOD, HOUSING, MEDICAL CARE, AND HEATING?: NOT HARD AT ALL

## 2023-07-25 SDOH — ECONOMIC STABILITY: FOOD INSECURITY: WITHIN THE PAST 12 MONTHS, YOU WORRIED THAT YOUR FOOD WOULD RUN OUT BEFORE YOU GOT MONEY TO BUY MORE.: NEVER TRUE

## 2023-07-25 SDOH — ECONOMIC STABILITY: HOUSING INSECURITY
IN THE LAST 12 MONTHS, WAS THERE A TIME WHEN YOU DID NOT HAVE A STEADY PLACE TO SLEEP OR SLEPT IN A SHELTER (INCLUDING NOW)?: NO

## 2023-07-25 ASSESSMENT — ENCOUNTER SYMPTOMS
RESPIRATORY NEGATIVE: 1
GASTROINTESTINAL NEGATIVE: 1

## 2023-07-25 NOTE — PROGRESS NOTES
Cody Belle is a 65 y.o. male and presents with Follow-up Chronic Condition    Tabatha Craig is under the care of spinal orthopedics for lumbar spinal stenosis with claudication, he wasin plans for surgery with  for decompression and fusion, but he says insuralayla denied it and asked for more   He is taking gabapentin 300 mg TID, he says he feels it does help relieve the pain. He says it keeps the pain at around 5 or 6 and he can function   He also has pain in his left shoulder due to rotator cuff syndrome, he recovered well from his surgery of the right surgery. Lisa Joseph his wife tells me that he snores a lot very loud, witnessed apneas, he dozes offf very easily multiple times a day, he does not feel asleep driving. He does not feel rested in the morning when he wakes up. No headaches. He says he feels restless all the time, worried, sometimes hopeless and very tired all the time, he has been taking lexapro 10 mg since April, did not make appointment with counseling, he says he does not have the referral.     Review of Systems  Review of Systems   Constitutional: Negative. HENT: Negative. Respiratory: Negative. Cardiovascular: Negative. Gastrointestinal: Negative. Genitourinary: Negative. Musculoskeletal: Negative. Skin: Negative. Neurological: Negative. Psychiatric/Behavioral: Negative.           Past Medical History:   Diagnosis Date    Arthritis     Chronic pain     back    Gout     Hyperlipidemia     Patient stated he was on medication     Lumbar radiculopathy     Psychiatric disorder     depression     Spinal stenosis      Past Surgical History:   Procedure Laterality Date    COLONOSCOPY      HERNIA REPAIR      twice    ORTHOPEDIC SURGERY      right hand trigger finger release      Social History     Socioeconomic History    Marital status:      Spouse name: None    Number of children: None    Years of education: None    Highest education level: None   Tobacco Use

## 2023-07-26 LAB
25(OH)D3+25(OH)D2 SERPL-MCNC: 19.8 NG/ML (ref 30–100)
ALBUMIN SERPL-MCNC: 4.2 G/DL (ref 3.9–4.9)
ALBUMIN/GLOB SERPL: 1.4 {RATIO} (ref 1.2–2.2)
ALP SERPL-CCNC: 80 IU/L (ref 44–121)
ALT SERPL-CCNC: 21 IU/L (ref 0–44)
AST SERPL-CCNC: 17 IU/L (ref 0–40)
BASOPHILS # BLD AUTO: 0.1 X10E3/UL (ref 0–0.2)
BASOPHILS NFR BLD AUTO: 1 %
BILIRUB SERPL-MCNC: 0.4 MG/DL (ref 0–1.2)
BUN SERPL-MCNC: 10 MG/DL (ref 8–27)
BUN/CREAT SERPL: 10 (ref 10–24)
CALCIUM SERPL-MCNC: 9.2 MG/DL (ref 8.6–10.2)
CHLORIDE SERPL-SCNC: 107 MMOL/L (ref 96–106)
CHOLEST SERPL-MCNC: 257 MG/DL (ref 100–199)
CO2 SERPL-SCNC: 23 MMOL/L (ref 20–29)
CREAT SERPL-MCNC: 1 MG/DL (ref 0.76–1.27)
EGFRCR SERPLBLD CKD-EPI 2021: 86 ML/MIN/1.73
EOSINOPHIL # BLD AUTO: 0.2 X10E3/UL (ref 0–0.4)
EOSINOPHIL NFR BLD AUTO: 2 %
ERYTHROCYTE [DISTWIDTH] IN BLOOD BY AUTOMATED COUNT: 13.1 % (ref 11.6–15.4)
GLOBULIN SER CALC-MCNC: 3 G/DL (ref 1.5–4.5)
GLUCOSE SERPL-MCNC: 91 MG/DL (ref 70–99)
HCT VFR BLD AUTO: 42.7 % (ref 37.5–51)
HDLC SERPL-MCNC: 54 MG/DL
HGB BLD-MCNC: 14 G/DL (ref 13–17.7)
IMM GRANULOCYTES # BLD AUTO: 0 X10E3/UL (ref 0–0.1)
IMM GRANULOCYTES NFR BLD AUTO: 0 %
LDLC SERPL CALC-MCNC: 177 MG/DL (ref 0–99)
LYMPHOCYTES # BLD AUTO: 2.7 X10E3/UL (ref 0.7–3.1)
LYMPHOCYTES NFR BLD AUTO: 39 %
MCH RBC QN AUTO: 29.6 PG (ref 26.6–33)
MCHC RBC AUTO-ENTMCNC: 32.8 G/DL (ref 31.5–35.7)
MCV RBC AUTO: 90 FL (ref 79–97)
MONOCYTES # BLD AUTO: 0.7 X10E3/UL (ref 0.1–0.9)
MONOCYTES NFR BLD AUTO: 10 %
NEUTROPHILS # BLD AUTO: 3.3 X10E3/UL (ref 1.4–7)
NEUTROPHILS NFR BLD AUTO: 48 %
PLATELET # BLD AUTO: 189 X10E3/UL (ref 150–450)
POTASSIUM SERPL-SCNC: 4.3 MMOL/L (ref 3.5–5.2)
PROT SERPL-MCNC: 7.2 G/DL (ref 6–8.5)
RBC # BLD AUTO: 4.73 X10E6/UL (ref 4.14–5.8)
SODIUM SERPL-SCNC: 142 MMOL/L (ref 134–144)
TRIGL SERPL-MCNC: 144 MG/DL (ref 0–149)
TSH SERPL DL<=0.005 MIU/L-ACNC: 1.98 UIU/ML (ref 0.45–4.5)
VLDLC SERPL CALC-MCNC: 26 MG/DL (ref 5–40)
WBC # BLD AUTO: 6.9 X10E3/UL (ref 3.4–10.8)

## 2023-07-29 RX ORDER — ESCITALOPRAM OXALATE 10 MG/1
TABLET ORAL
Qty: 90 TABLET | OUTPATIENT
Start: 2023-07-29

## 2023-08-01 PROBLEM — R06.83 SNORING: Status: ACTIVE | Noted: 2023-08-01

## 2023-08-01 PROBLEM — E78.2 MIXED HYPERLIPIDEMIA: Status: ACTIVE | Noted: 2023-08-01

## 2023-08-01 PROBLEM — G47.19 EXCESSIVE DAYTIME SLEEPINESS: Status: ACTIVE | Noted: 2023-08-01

## 2023-08-01 PROBLEM — N18.2 CHRONIC KIDNEY DISEASE, STAGE 2 (MILD): Status: ACTIVE | Noted: 2022-10-19

## 2023-08-01 PROBLEM — E55.9 VITAMIN D DEFICIENCY, UNSPECIFIED: Status: ACTIVE | Noted: 2022-04-06

## 2023-08-01 PROBLEM — F32.A ANXIETY AND DEPRESSION: Status: ACTIVE | Noted: 2023-08-01

## 2023-08-01 PROBLEM — M48.07 SPINAL STENOSIS, LUMBOSACRAL REGION: Status: ACTIVE | Noted: 2023-08-01

## 2023-08-01 PROBLEM — F41.9 ANXIETY AND DEPRESSION: Status: ACTIVE | Noted: 2023-08-01

## 2023-08-16 ENCOUNTER — HOSPITAL ENCOUNTER (OUTPATIENT)
Facility: HOSPITAL | Age: 63
Setting detail: RECURRING SERIES
Discharge: HOME | End: 2023-08-19
Payer: COMMERCIAL

## 2023-08-16 PROCEDURE — 97162 PT EVAL MOD COMPLEX 30 MIN: CPT

## 2023-08-16 RX ORDER — ATORVASTATIN CALCIUM 20 MG/1
20 TABLET, FILM COATED ORAL DAILY
Qty: 90 TABLET | Refills: 2 | Status: SHIPPED | OUTPATIENT
Start: 2023-08-16

## 2023-08-16 NOTE — THERAPY EVALUATION
[address] [place] [address], [facility], [address] [facility]  Ph: [phone]     Fax: [phone]            PHYSICAL THERAPY - EVALUATION/PLAN OF CARE NOTE (updated 3/23)        Date: 2023            Patient Name:  Edmar Francois :  1960   Medical   Diagnosis:  Other low back pain [M54.59]  Spinal stenosis, lumbar region with neurogenic claudication [M48.062] Treatment Diagnosis:  M54.42  LUMBAGO WITH SCIATICA, LEFT SIDE and M54.59  OTHER LOWER BACK PAIN    Referral Source:  Adrianna Solorzano MD Provider #:  [id number]                [facility] / Plan: [facility] / Product Type: *No Product type* /            Patient  verified yes     Visit #   Current  / Total 1 12-16    Time   In / Out 1:00 2:00   Total Treatment Time 60   Total Timed Codes 60            SUBJECTIVE  Pain Level (0-10 scale): 6  [x]constant []intermittent []improving []worsening []no change since onset     Any medication changes, allergies to medications, adverse drug reactions, diagnosis change, or new procedure performed?: [x] No    [] Yes (see summary sheet for update)  Medications: Verified on Patient Summary List     Subjective functional status/changes:     Patient was referred to PT due to chronic back pain (), pt stated he had surgery on his back 2021 laminotomy and bilateral L4 roots with foraminotomy using a minimally invasive tubular approach. He did not have therapy after surgery he reports not much change he still has back pain. Pt stated he has good days and bad days, he has difficulty laying flat or on his R side, he can only lay on his L side. He also reports difficulty walking, getting up from a chair, bending forward and lifting. Pt knows he needs another surgery but his insurance wants him to do therapy again before the next surgery.  Pt stated his pain goes across his lower back and if he ambulates > 10-15 min the

## 2023-08-16 NOTE — RESULT ENCOUNTER NOTE
Vitamin D is low, cholesterol is higher compared to before, everything else is normal, sending vitamin D prescription for replacement, increase atorvastatin to 20 mg.  Thanks

## 2023-08-16 NOTE — PROGRESS NOTES
clinic with decreased lumbar/hip AROM, decreased BLE strength, decreased sensation on numbness on L LE, decreased core activation, decreased balance, (+) SLR/slump test on L LE,  flexibility, limited ambulation and functional mobility. Pt will benefit from skilled services to improve performance with core stabilization and posture, spine flexibility and strength, increased awareness of neuromuscular control of abdominal muscle activation and pelvic positioning, and to address impairments in order to meet functional goals. Evaluation Complexity:  History:  HIGH Complexity :3+ comorbidities / personal factors will impact the outcome/ POC ; Examination:  MEDIUM Complexity : 3 Standardized tests and measures addressin body structure, function, activity limitation and / or participation in recreation  ;Presentation:  MEDIUM Complexity : Evolving with changing characteristics  ; Clinical Decision Making:  MEDIUM Complexity : FOTO score of 26-74 Overall Complexity Rating: MEDIUM  Problem List: pain affecting function, decrease ROM, decrease strength, impaired gait/balance, decrease ADL/functional abilities, decrease activity tolerance, decrease flexibility/joint mobility, and decrease transfer abilities    Treatment Plan may include any combination of the followin Therapeutic Exercise, 17285 Neuromuscular Re-Education, 42445 Manual Therapy, 84380 Electrical Stim unattended, 28488 Gait Training, 52567 Ultrasound, and 33628 Needle Insertion w/o Injection (1 or 2 muscles)  Patient / Family readiness to learn indicated by: asking questions, trying to perform skills, interest, return verbalization , and return demonstration   Persons(s) to be included in education: patient (P)  Barriers to Learning/Limitations: none  Measures taken if barriers to learning present: N/A  Patient Self Reported Health Status: fair  Rehabilitation Potential: fair    Objective/Functional Outcome Measure: basic mobility  AM-PAC:

## 2023-09-06 ENCOUNTER — HOSPITAL ENCOUNTER (OUTPATIENT)
Facility: HOSPITAL | Age: 63
Setting detail: RECURRING SERIES
End: 2023-09-06
Payer: COMMERCIAL

## 2023-09-13 ENCOUNTER — HOSPITAL ENCOUNTER (OUTPATIENT)
Facility: HOSPITAL | Age: 63
Setting detail: RECURRING SERIES
Discharge: HOME | End: 2023-09-16
Payer: COMMERCIAL

## 2023-09-13 PROCEDURE — 97110 THERAPEUTIC EXERCISES: CPT | Performed by: PHYSICAL THERAPIST

## 2023-09-13 PROCEDURE — G0283 ELEC STIM OTHER THAN WOUND: HCPCS | Performed by: PHYSICAL THERAPIST

## 2023-09-13 NOTE — PROGRESS NOTES
AM-PAC: 54.90%  AM-PAC score = an established functional score where 0% = no disability      Pt Goals: \"To get better and reduce my back pain\"     Short Term Goals: To be accomplished in 4-6  treatments     Pt will be Independent with HEP to assist with progression of therapy and decrease soreness. [] Met [] Not met [] Partially met  Date:     Pt will use heat/ice/muscle rubs/massage as instructed to decrease pain and inflammation. No pain > 5/10 on VAS. [] Met [] Not met [] Partially met  Date:     Pt will use proper sleeping techniques for pain prevention and ideal posture to prevent muscle shortening contributing to pain issues. [] Met [] Not met [] Partially met  Date:     Long Term Goals: To be accomplished in 12-18  treatments  Pt will have improved AMPAC score by 5%. [] Met [] Not met [] Partially met Date:      Pt will be able to stand greater than 10  minutes without pain > 4/10 so they can do ADL's like wash dishes. [] Met [] Not met [] Partially met Date:      Pt will be able to ambulate community distances and walk > 20 min with less pain or difficulty to get groceries, medicine, etc.      [] Met [] Not met [] Partially met Date:      Pt will be able to bend down to put on socks/shoes with less difficulty due to improved ROM. [] Met [] Not met [] Partially met Date:      Pt LE strength will increase by 1/2 to 1 grade to improve lumbar stability. [] Met [] Not met [] Partially met Date:      Pt will report centralization of symptoms/less radicular pain by 50%. [] Met [] Not met [] Partially met Date:                  Pt TUG will decrease to <=15 sec to improve his balance and reduce his fall risk.        [] Met [] Not met [] Partially met Date:                  Frequency / Duration: Patient to be seen 1-2  times per week for 37-54  treatments  Certification Period:  8/16/23 to 10/13/23  (60 DAYS)        PLAN  Yes  Continue plan of care  Re-Cert Due:

## 2023-09-20 ENCOUNTER — HOSPITAL ENCOUNTER (OUTPATIENT)
Facility: HOSPITAL | Age: 63
Setting detail: RECURRING SERIES
Discharge: HOME | End: 2023-09-23
Payer: COMMERCIAL

## 2023-09-20 PROCEDURE — G0283 ELEC STIM OTHER THAN WOUND: HCPCS

## 2023-09-20 PROCEDURE — 97110 THERAPEUTIC EXERCISES: CPT

## 2023-09-20 NOTE — PROGRESS NOTES
[] N/A  []WNL  []Minimal  []Moderate  [x] Major p! Limited with mobility on L>R   Additional comments: limited with extension>flexion and SB/rot towards L>R- same as eval       Specific joints: *normal values in ()  Hip                                AROM                            PROM                              MMT    R L R L R L   Flexion (120) 90p! 90p!     4- 4- P! Extension (15)         2- standing  2- standing    Abduction (40)         2+ 2+   Adduction (30)         2+ 2+   IR (40)               ER (40)               Additional comments:            90-90 flexibility test   R 65 deg / L 45deg   Able to complete supine bridge but with increased LBP      Pt stated he can't lay prone or on R side  due to increased back pain      Knee                                 AROM                          PROM                           MMT    R L R L R L   Extension (0)          4 4 p! Flexion (145)         4 4   Additional comments: Ankle                                 AROM                       PROM                             MMT    R L R L R L   Dorsiflexion (15)         4 4-   Plantarflexion (50)         2- 2-    Additional comments:   Unable to complete standing DHR        Objective/Functional Outcome Measure: basic mobility  AM-PAC: Initial: 54.90%           9/20/23: 53.20%      Progress toward goals / Updated goals:  [x]  See Progress Note/Recertification  Objective/Functional Outcome Measure: basic mobility  AM-PAC: 54.90%  AM-PAC score = an established functional score where 0% = no disability      Pt Goals: \"To get better and reduce my back pain\"   [] Met [x] Not met [] Partially met  Date: 9/20  Short Term Goals: To be accomplished in 4-6  treatments     Pt will be Independent with HEP to assist with progression of therapy and decrease soreness.       [x] Met [] Not met [] Partially met  Date: 9/20 states he does them as much as possible      Pt will use heat/ice/muscle rubs/massage as instructed to
4 4-   Plantarflexion (50)         2- 2-    Additional comments:   Unable to complete standing DHR        Objective/Functional Outcome Measure: basic mobility  AM-PAC: Initial: 54.90%         9/20/23: 53.20%    Pain Level at end of session (0-10 scale): 1/10      Assessment   Patient tolerated session well. Pt has attended 3 visits of PT since evaluation on 8/16/23. MMT and overall functional mobility grossly the same since evaluation, no goals have been met. Pt is expecting to have surgery on the L/S. Pt reporting no change in symptoms since [facility]. Encouraged HEP. Plan to continue to progress mobility and strength per POC to improve overall functional mobility. Patient will continue to benefit from skilled PT / OT services to modify and progress therapeutic interventions, analyze and address functional mobility deficits, analyze and address ROM deficits, analyze and address strength deficits, analyze and address soft tissue restrictions, and analyze and modify for postural abnormalities to address functional deficits and attain remaining goals. Progress toward goals / Updated goals:  []  See Progress Note/Recertification  Objective/Functional Outcome Measure: basic mobility  AM-PAC: 54.90%  AM-PAC score = an established functional score where 0% = no disability      Pt Goals: \"To get better and reduce my back pain\"   [] Met [x] Not met [] Partially met  Date: 9/20  Short Term Goals: To be accomplished in 4-6  treatments     Pt will be Independent with HEP to assist with progression of therapy and decrease soreness. [x] Met [] Not met [] Partially met  Date: 9/20 states he does them as much as possible      Pt will use heat/ice/muscle rubs/massage as instructed to decrease pain and inflammation. No pain > 5/10 on VAS.       [] Met [x] Not met [] Partially met  Date: 9/20 going to get heat     Pt will use proper sleeping techniques for pain prevention and ideal posture to prevent muscle shortening

## 2023-09-26 ENCOUNTER — HOSPITAL ENCOUNTER (OUTPATIENT)
Facility: HOSPITAL | Age: 63
Setting detail: RECURRING SERIES
Discharge: HOME | End: 2023-09-29
Payer: COMMERCIAL

## 2023-09-26 PROCEDURE — 97110 THERAPEUTIC EXERCISES: CPT

## 2023-09-26 NOTE — PROGRESS NOTES
PHYSICAL THERAPY - DAILY TREATMENT NOTE (updated 3/23)      Date: 2023          Patient Name:  Saturnino Rivera :  1960   Medical   Diagnosis:  Other low back pain [M54.59]  Spinal stenosis, lumbar region with neurogenic claudication [M48.062] Treatment Diagnosis:  M54.42  LUMBAGO WITH SCIATICA, LEFT SIDE and M54.59  OTHER LOWER BACK PAIN    Referral Source:  Melodie Carter MD Insurance:   Payor: [facility] / Plan: [facility] / Product Type: *No Product type* /                     Patient  verified yes     Visit #   Current  / Total 4 12-16   Time   In / Out 10:03am 1029am   Total Treatment Time 26   Total Timed Codes 26         SUBJECTIVE    Pain Level (0-10 scale): 7/10 B LB into LLE     Any medication changes, allergies to medications, adverse drug reactions, diagnosis change, or new procedure performed?: [x] No    [] Yes (see summary sheet for update)  Medications: Verified on Patient Summary List    Subjective functional status/changes:     Pt states he needs to leave early due to an appt for MRI in [place] and may be getting an injection as well. He will be seeing Dr Hernandez Nurse. OBJECTIVE      Therapeutic Procedures: Tx Min Billable or 1:1 Min (if diff from Tx Min) Procedure, Rationale, Specifics   26  02943 Therapeutic Exercise (timed):  increase ROM, strength, coordination, balance, and proprioception to improve patient's ability to progress to PLOF and address remaining functional goals. (see flow sheet as applicable)     Details if applicable:          26     Total Total       Modalities Rationale:     decrease pain to improve patient's ability to progress to PLOF and address remaining functional goals.        min [x] Estim Unattended,             type/location:IFC to B LB       []  w/ice    [x]  w/heat Supine with LE's on bolster     Skin assessment post-treatment (if applicable):    []  intact    []  redness- no adverse reaction                 []redness

## 2023-10-03 ENCOUNTER — HOSPITAL ENCOUNTER (OUTPATIENT)
Facility: HOSPITAL | Age: 63
Setting detail: RECURRING SERIES
Discharge: HOME | End: 2023-10-06
Payer: COMMERCIAL

## 2023-10-03 PROCEDURE — 97110 THERAPEUTIC EXERCISES: CPT

## 2023-10-03 NOTE — PROGRESS NOTES
PHYSICAL THERAPY - DAILY TREATMENT NOTE (updated 3/23)      Date: 10/3/2023          Patient Name:  Keith Alcaraz :  1960   Medical   Diagnosis:  Other low back pain [M54.59]  Spinal stenosis, lumbar region with neurogenic claudication [M48.062] Treatment Diagnosis:  M54.42  LUMBAGO WITH SCIATICA, LEFT SIDE and M54.59  OTHER LOWER BACK PAIN    Referral Source:  Angela Thompson MD Insurance:   Payor: [facility] / Plan: [facility] / Product Type: *No Product type* /                     Patient  verified yes     Visit #   Current  / Total 5 12-16   Time   In / Out 1031am 1100am   Total Treatment Time 29   Total Timed Codes 29         SUBJECTIVE    Pain Level (0-10 scale): 6.5/10 B LB into LLE     Any medication changes, allergies to medications, adverse drug reactions, diagnosis change, or new procedure performed?: [x] No    [] Yes (see summary sheet for update)  Medications: Verified on Patient Summary List    Subjective functional status/changes:     Pt still having a lot of radicular pain into his hips. Pt requested 30 min session due to a PCP appt. Pt states he had an MRI at his appt last week and did see Dr Dawn Weber, who was ready to do the surgery and pt did have an injection. Pt states it may be in November or December. OBJECTIVE      Therapeutic Procedures: Tx Min Billable or 1:1 Min (if diff from Tx Min) Procedure, Rationale, Specifics   29  71566 Therapeutic Exercise (timed):  increase ROM, strength, coordination, balance, and proprioception to improve patient's ability to progress to PLOF and address remaining functional goals. (see flow sheet as applicable)     Details if applicable:          29     Total Total       Modalities Rationale:     decrease pain to improve patient's ability to progress to PLOF and address remaining functional goals.        min [x] Estim Unattended,             type/location:IFC to B LB       []  w/ice    [x]  w/heat Supine with LE's

## 2023-10-10 ENCOUNTER — APPOINTMENT (OUTPATIENT)
Facility: HOSPITAL | Age: 63
End: 2023-10-10
Payer: COMMERCIAL

## 2023-10-11 ENCOUNTER — APPOINTMENT (OUTPATIENT)
Facility: HOSPITAL | Age: 63
End: 2023-10-11
Payer: COMMERCIAL

## 2023-10-17 ENCOUNTER — HOSPITAL ENCOUNTER (OUTPATIENT)
Facility: HOSPITAL | Age: 63
Setting detail: RECURRING SERIES
Discharge: HOME | End: 2023-10-20
Payer: COMMERCIAL

## 2023-10-17 PROCEDURE — 97110 THERAPEUTIC EXERCISES: CPT

## 2023-10-17 NOTE — THERAPY RECERTIFICATION
get groceries, medicine, etc.      [] Met [x] Not met [] Partially met Date:      Pt will be able to bend down to put on socks/shoes with less difficulty due to improved ROM. [] Met [x] Not met [] Partially met Date:      Pt LE strength will increase by 1/2 to 1 grade to improve lumbar stability. [] Met [x] Not met [] Partially met Date:      Pt will report centralization of symptoms/less radicular pain by 50%. [] Met [x] Not met [] Partially met Date:                  Pt TUG will decrease to <=15 sec to improve his balance and reduce his fall risk. [] Met [] Not met [] Partially met Date:                    Key Functional Changes/Progress: minimal to no improvements  Problem List: pain affecting function, decrease ROM, decrease strength, impaired gait/balance, decrease ADL/functional abilities, decrease activity tolerance, decrease flexibility/joint mobility, and decrease transfer abilities    Treatment Plan may include any combination of the followin Therapeutic Exercise, 57246 Neuromuscular Re-Education, 13139 Manual Therapy, 35429 Therapeutic Activity, 99834 Self Care/Home Management, 14323 Electrical Stim unattended, and 10179 Ultrasound  Patient Goal(s) has been updated and includes: \"relieve pain\"    Goals for this certification period include and are to be achieved in   6-10  treatments:    Continue unmet goals. Frequency / Duration:   Patient to be seen   1-2   times per week for   6-10    treatments:      Assessments/Recommendations for Continuation of Care: Pt has been seen for 6 visits with minimal changes noted. Pt reports continued pain at 6.5/10 and is anxious to get his surgery scheduled. He has missed 4 scheduled appointments and had shortened visit at last 3 sessions. Full reassessment not completed today due to time constraints as pt was leaving for a family . Pt reports relief with modalities and heat but minimal benefit from exercise.

## 2023-10-17 NOTE — PROGRESS NOTES
PHYSICAL THERAPY - DAILY TREATMENT NOTE (updated 3/23)      Date: 10/17/2023          Patient Name:  Derrell Huitron :  1960   Medical   Diagnosis:  Other low back pain [M54.59]  Spinal stenosis, lumbar region with neurogenic claudication [M48.062] Treatment Diagnosis:  M54.42  LUMBAGO WITH SCIATICA, LEFT SIDE and M54.59  OTHER LOWER BACK PAIN    Referral Source:  Richard Rocha MD Insurance:   Payor: [facility] / Plan: [facility] / Product Type: *No Product type* /                     Patient  verified yes     Visit #   Current  / Total 6 12-16   Time   In / Out 1035am 1103am   Total Treatment Time 28   Total Timed Codes 28         SUBJECTIVE    Pain Level (0-10 scale): 6.5/10 B LB into LLE     Any medication changes, allergies to medications, adverse drug reactions, diagnosis change, or new procedure performed?: [x] No    [] Yes (see summary sheet for update)  Medications: Verified on Patient Summary List    Subjective functional status/changes:     Pt requesting to leave early because he is leaving town today for a  in [place]. No change in back symptoms. States he wants to try and see if his insurance will let him schedule his back surgery. OBJECTIVE      Therapeutic Procedures: Tx Min Billable or 1:1 Min (if diff from Tx Min) Procedure, Rationale, Specifics   28  99960 Therapeutic Exercise (timed):  increase ROM, strength, coordination, balance, and proprioception to improve patient's ability to progress to PLOF and address remaining functional goals. (see flow sheet as applicable)     Details if applicable:          28     Total Total       Modalities Rationale:     decrease pain to improve patient's ability to progress to PLOF and address remaining functional goals.        min [x] Estim Unattended,             type/location:IFC to B LB       []  w/ice    [x]  w/heat Supine with LE's on bolster     Modalities Rationale:     decrease edema and increase

## 2023-10-18 ENCOUNTER — APPOINTMENT (OUTPATIENT)
Facility: HOSPITAL | Age: 63
End: 2023-10-18
Payer: COMMERCIAL

## 2023-10-24 ENCOUNTER — HOSPITAL ENCOUNTER (OUTPATIENT)
Facility: HOSPITAL | Age: 63
Setting detail: RECURRING SERIES
Discharge: HOME | End: 2023-10-27
Payer: COMMERCIAL

## 2023-10-24 PROCEDURE — 97110 THERAPEUTIC EXERCISES: CPT | Performed by: PHYSICAL THERAPIST

## 2023-10-31 ENCOUNTER — APPOINTMENT (OUTPATIENT)
Facility: HOSPITAL | Age: 63
End: 2023-10-31
Payer: COMMERCIAL

## 2024-01-30 ENCOUNTER — OFFICE VISIT (OUTPATIENT)
Facility: CLINIC | Age: 64
End: 2024-01-30
Payer: COMMERCIAL

## 2024-01-30 VITALS
RESPIRATION RATE: 18 BRPM | WEIGHT: 249.4 LBS | SYSTOLIC BLOOD PRESSURE: 130 MMHG | TEMPERATURE: 98.2 F | BODY MASS INDEX: 34.92 KG/M2 | OXYGEN SATURATION: 91 % | HEIGHT: 71 IN | HEART RATE: 77 BPM | DIASTOLIC BLOOD PRESSURE: 76 MMHG

## 2024-01-30 DIAGNOSIS — M48.07 SPINAL STENOSIS, LUMBOSACRAL REGION: ICD-10-CM

## 2024-01-30 DIAGNOSIS — F41.9 ANXIETY AND DEPRESSION: ICD-10-CM

## 2024-01-30 DIAGNOSIS — R06.00 DYSPNEA, UNSPECIFIED TYPE: ICD-10-CM

## 2024-01-30 DIAGNOSIS — E78.2 MIXED HYPERLIPIDEMIA: ICD-10-CM

## 2024-01-30 DIAGNOSIS — J44.9 CHRONIC OBSTRUCTIVE PULMONARY DISEASE, UNSPECIFIED COPD TYPE (HCC): ICD-10-CM

## 2024-01-30 DIAGNOSIS — E55.9 VITAMIN D DEFICIENCY, UNSPECIFIED: ICD-10-CM

## 2024-01-30 DIAGNOSIS — F41.9 ANXIETY AND DEPRESSION: Primary | ICD-10-CM

## 2024-01-30 DIAGNOSIS — F32.A ANXIETY AND DEPRESSION: ICD-10-CM

## 2024-01-30 DIAGNOSIS — F32.A ANXIETY AND DEPRESSION: Primary | ICD-10-CM

## 2024-01-30 PROCEDURE — 99214 OFFICE O/P EST MOD 30 MIN: CPT | Performed by: STUDENT IN AN ORGANIZED HEALTH CARE EDUCATION/TRAINING PROGRAM

## 2024-01-30 RX ORDER — ALBUTEROL SULFATE 90 UG/1
2 AEROSOL, METERED RESPIRATORY (INHALATION) EVERY 4 HOURS PRN
Qty: 18 G | Refills: 2 | Status: SHIPPED | OUTPATIENT
Start: 2024-01-30

## 2024-01-30 RX ORDER — FLUTICASONE FUROATE, UMECLIDINIUM BROMIDE AND VILANTEROL TRIFENATATE 100; 62.5; 25 UG/1; UG/1; UG/1
1 POWDER RESPIRATORY (INHALATION) DAILY
Qty: 28 EACH | Refills: 2 | Status: SHIPPED | OUTPATIENT
Start: 2024-01-30

## 2024-01-30 RX ORDER — ATORVASTATIN CALCIUM 20 MG/1
20 TABLET, FILM COATED ORAL DAILY
Qty: 90 TABLET | Refills: 2 | Status: SHIPPED | OUTPATIENT
Start: 2024-01-30

## 2024-01-30 RX ORDER — ESCITALOPRAM OXALATE 20 MG/1
20 TABLET ORAL DAILY
Qty: 90 TABLET | Refills: 2 | Status: SHIPPED | OUTPATIENT
Start: 2024-01-30

## 2024-01-30 RX ORDER — GABAPENTIN 300 MG/1
300 CAPSULE ORAL 2 TIMES DAILY
Qty: 60 CAPSULE | Refills: 0 | Status: SHIPPED | OUTPATIENT
Start: 2024-01-30 | End: 2024-02-29

## 2024-01-30 ASSESSMENT — ENCOUNTER SYMPTOMS
SHORTNESS OF BREATH: 1
SORE THROAT: 0
COUGH: 1
FACIAL SWELLING: 0
DIARRHEA: 0
ABDOMINAL PAIN: 0
CONSTIPATION: 0

## 2024-01-30 ASSESSMENT — PATIENT HEALTH QUESTIONNAIRE - PHQ9
SUM OF ALL RESPONSES TO PHQ QUESTIONS 1-9: 0
SUM OF ALL RESPONSES TO PHQ QUESTIONS 1-9: 0
1. LITTLE INTEREST OR PLEASURE IN DOING THINGS: 0
SUM OF ALL RESPONSES TO PHQ QUESTIONS 1-9: 0
2. FEELING DOWN, DEPRESSED OR HOPELESS: 0
3. TROUBLE FALLING OR STAYING ASLEEP: 0
6. FEELING BAD ABOUT YOURSELF - OR THAT YOU ARE A FAILURE OR HAVE LET YOURSELF OR YOUR FAMILY DOWN: 0
SUM OF ALL RESPONSES TO PHQ9 QUESTIONS 1 & 2: 0
9. THOUGHTS THAT YOU WOULD BE BETTER OFF DEAD, OR OF HURTING YOURSELF: 0
8. MOVING OR SPEAKING SO SLOWLY THAT OTHER PEOPLE COULD HAVE NOTICED. OR THE OPPOSITE, BEING SO FIGETY OR RESTLESS THAT YOU HAVE BEEN MOVING AROUND A LOT MORE THAN USUAL: 0
SUM OF ALL RESPONSES TO PHQ QUESTIONS 1-9: 0
5. POOR APPETITE OR OVEREATING: 0
7. TROUBLE CONCENTRATING ON THINGS, SUCH AS READING THE NEWSPAPER OR WATCHING TELEVISION: 0
4. FEELING TIRED OR HAVING LITTLE ENERGY: 0
10. IF YOU CHECKED OFF ANY PROBLEMS, HOW DIFFICULT HAVE THESE PROBLEMS MADE IT FOR YOU TO DO YOUR WORK, TAKE CARE OF THINGS AT HOME, OR GET ALONG WITH OTHER PEOPLE: 0

## 2024-01-30 NOTE — PROGRESS NOTES
Yann Bob (: 1960) is a 63 y.o. male, Established patient patient, here for evaluation of the following chief complaint(s):  Follow-up Chronic Condition       ASSESSMENT/PLAN:  Below is the assessment and plan developed based on review of pertinent history, physical exam, labs, studies, and medications.    1. Anxiety and depression  -     escitalopram (LEXAPRO) 20 MG tablet; Take 1 tablet by mouth daily, Disp-90 tablet, R-2Normal  -     CBC; Future  -     Comprehensive Metabolic Panel; Future  2. Spinal stenosis, lumbosacral region  -     gabapentin (NEURONTIN) 300 MG capsule; Take 1 capsule by mouth in the morning and at bedtime for 30 days. Max Daily Amount: 600 mg, Disp-60 capsule, R-0Normal  3. Vitamin D deficiency, unspecified  -     Vitamin D 25 Hydroxy; Future  4. Mixed hyperlipidemia  -     atorvastatin (LIPITOR) 20 MG tablet; Take 1 tablet by mouth daily, Disp-90 tablet, R-2Normal  -     Lipid Panel; Future  5. Dyspnea, unspecified type  -     XR CHEST (2 VIEWS); Future  6. Chronic obstructive pulmonary disease, unspecified COPD type (HCC)  -     fluticasone-umeclidin-vilant (TRELEGY ELLIPTA) 100-62.5-25 MCG/ACT AEPB inhaler; Inhale 1 puff into the lungs daily, Disp-28 each, R-2Normal  -     albuterol sulfate HFA (PROVENTIL;VENTOLIN;PROAIR) 108 (90 Base) MCG/ACT inhaler; Inhale 2 puffs into the lungs every 4 hours as needed for Wheezing or Shortness of Breath, Disp-18 g, R-2Normal    Previously under care of Dr. Roldan, first visit with me     States he ran out of all his medications about a month ago.    Hyperlipidemia: Refills of atorvastatin sent in, lipid panel ordered but would likely be high as he is not taking medicine since a month.    Will start him on Trelegy for COPD, does endorse shortness of breath, wheezing, which could be because of this.  Will order chest x-ray.  Denies any complaints of chest pain, dizziness.  He does have back surgery scheduled for next month.   Recommended to

## 2024-01-30 NOTE — PROGRESS NOTES
1. \"Have you been to the ER, urgent care clinic since your last visit?  Hospitalized since your last visit?\" No    2. \"Have you seen or consulted any other health care providers outside of the [facility] since your last visit?\" No     3. For patients aged 45-75: Has the patient had a colonoscopy / FIT/ Cologuard? Yes - Care Gap present. Most recent result on file      If the patient is female:    4. For patients aged 40-74: Has the patient had a mammogram within the past 2 years? NA - based on age or sex      5. For patients aged 21-65: Has the patient had a pap smear? NA - based on age or sex    Chief Complaint   Patient presents with    Follow-up Chronic Condition     /76 (Site: Right Upper Arm, Position: Sitting, Cuff Size: Large Adult)   Pulse 77   Temp 98.2 °F (36.8 °C) (Oral)   Resp 18   Ht 1.803 m (5' 11\")   Wt 113.1 kg (249 lb 6.4 oz)   SpO2 91%   BMI 34.78 kg/m²

## 2024-02-15 LAB
25(OH)D3+25(OH)D2 SERPL-MCNC: 22.8 NG/ML (ref 30–100)
ALBUMIN SERPL-MCNC: 4.2 G/DL (ref 3.9–4.9)
ALBUMIN/GLOB SERPL: 1.7 {RATIO} (ref 1.2–2.2)
ALP SERPL-CCNC: 74 IU/L (ref 44–121)
ALT SERPL-CCNC: 27 IU/L (ref 0–44)
AST SERPL-CCNC: 20 IU/L (ref 0–40)
BILIRUB SERPL-MCNC: 0.5 MG/DL (ref 0–1.2)
BUN SERPL-MCNC: 13 MG/DL (ref 8–27)
BUN/CREAT SERPL: 14 (ref 10–24)
CALCIUM SERPL-MCNC: 8.9 MG/DL (ref 8.6–10.2)
CHLORIDE SERPL-SCNC: 105 MMOL/L (ref 96–106)
CHOLEST SERPL-MCNC: 219 MG/DL (ref 100–199)
CO2 SERPL-SCNC: 23 MMOL/L (ref 20–29)
CREAT SERPL-MCNC: 0.96 MG/DL (ref 0.76–1.27)
EGFRCR SERPLBLD CKD-EPI 2021: 89 ML/MIN/1.73
ERYTHROCYTE [DISTWIDTH] IN BLOOD BY AUTOMATED COUNT: 12.8 % (ref 11.6–15.4)
GLOBULIN SER CALC-MCNC: 2.5 G/DL (ref 1.5–4.5)
GLUCOSE SERPL-MCNC: 101 MG/DL (ref 70–99)
HCT VFR BLD AUTO: 42.2 % (ref 37.5–51)
HDLC SERPL-MCNC: 54 MG/DL
HGB BLD-MCNC: 14 G/DL (ref 13–17.7)
LDLC SERPL CALC-MCNC: 154 MG/DL (ref 0–99)
MCH RBC QN AUTO: 29.7 PG (ref 26.6–33)
MCHC RBC AUTO-ENTMCNC: 33.2 G/DL (ref 31.5–35.7)
MCV RBC AUTO: 90 FL (ref 79–97)
PLATELET # BLD AUTO: 183 X10E3/UL (ref 150–450)
POTASSIUM SERPL-SCNC: 4.2 MMOL/L (ref 3.5–5.2)
PROT SERPL-MCNC: 6.7 G/DL (ref 6–8.5)
RBC # BLD AUTO: 4.71 X10E6/UL (ref 4.14–5.8)
SODIUM SERPL-SCNC: 141 MMOL/L (ref 134–144)
TRIGL SERPL-MCNC: 65 MG/DL (ref 0–149)
VLDLC SERPL CALC-MCNC: 11 MG/DL (ref 5–40)
WBC # BLD AUTO: 6.5 X10E3/UL (ref 3.4–10.8)

## 2024-02-16 DIAGNOSIS — Z01.818 PRE-OPERATIVE CLEARANCE: Primary | ICD-10-CM

## 2024-02-17 ENCOUNTER — APPOINTMENT (OUTPATIENT)
Facility: HOSPITAL | Age: 64
End: 2024-02-17
Payer: COMMERCIAL

## 2024-02-17 ENCOUNTER — HOSPITAL ENCOUNTER (EMERGENCY)
Facility: HOSPITAL | Age: 64
Discharge: HOME | End: 2024-02-17
Attending: EMERGENCY MEDICINE
Payer: COMMERCIAL

## 2024-02-17 VITALS
SYSTOLIC BLOOD PRESSURE: 168 MMHG | BODY MASS INDEX: 33.6 KG/M2 | WEIGHT: 240 LBS | OXYGEN SATURATION: 98 % | HEART RATE: 79 BPM | TEMPERATURE: 98.1 F | RESPIRATION RATE: 17 BRPM | DIASTOLIC BLOOD PRESSURE: 91 MMHG | HEIGHT: 71 IN

## 2024-02-17 DIAGNOSIS — R05.9 COUGH, UNSPECIFIED TYPE: Primary | ICD-10-CM

## 2024-02-17 LAB
FLUAV AG NPH QL IA: NEGATIVE
FLUBV AG NOSE QL IA: NEGATIVE

## 2024-02-17 PROCEDURE — 87804 INFLUENZA ASSAY W/OPTIC: CPT

## 2024-02-17 PROCEDURE — 99283 EMERGENCY DEPT VISIT LOW MDM: CPT

## 2024-02-17 PROCEDURE — 71046 X-RAY EXAM CHEST 2 VIEWS: CPT

## 2024-02-17 ASSESSMENT — PAIN - FUNCTIONAL ASSESSMENT: PAIN_FUNCTIONAL_ASSESSMENT: 0-10

## 2024-02-17 ASSESSMENT — PAIN SCALES - GENERAL: PAINLEVEL_OUTOF10: 3

## 2024-02-17 NOTE — ED TRIAGE NOTES
Pt to er with c/o cough and congestion x 3 days, seen at [facility] 2 days and xray said maybe pneumonia. Pt to er for a second opinion

## 2024-02-17 NOTE — ED PROVIDER NOTES
medications for this encounter.     Current Outpatient Medications   Medication Sig Dispense Refill    escitalopram (LEXAPRO) 20 MG tablet Take 1 tablet by mouth daily 90 tablet 2    atorvastatin (LIPITOR) 20 MG tablet Take 1 tablet by mouth daily 90 tablet 2    fluticasone-umeclidin-vilant (TRELEGY ELLIPTA) 100-62.5-25 MCG/ACT AEPB inhaler Inhale 1 puff into the lungs daily 28 each 2    albuterol sulfate HFA (PROVENTIL;VENTOLIN;PROAIR) 108 (90 Base) MCG/ACT inhaler Inhale 2 puffs into the lungs every 4 hours as needed for Wheezing or Shortness of Breath 18 g 2    gabapentin (NEURONTIN) 300 MG capsule Take 1 capsule by mouth in the morning and at bedtime for 30 days. Max Daily Amount: 600 mg 60 capsule 0    Cholecalciferol (VITAMIN D3) 125 MCG (5000 UT) CAPS Take 1 capsule by mouth daily 90 capsule 1    fluticasone (FLONASE) 50 MCG/ACT nasal spray 2 sprays by Nasal route daily      loratadine (CLARITIN) 10 MG tablet Take 1 tablet by mouth daily         Social Determinants of Health:   Social Determinants of Health     Tobacco Use: High Risk (1/30/2024)    Patient History     Smoking Tobacco Use: Some Days     Smokeless Tobacco Use: Never     Passive Exposure: Current   Alcohol Use: Not on file   Financial Resource Strain: Low Risk  (7/25/2023)    Overall Financial Resource Strain (CARDIA)     Difficulty of Paying Living Expenses: Not hard at all   Food Insecurity: Not on file (7/25/2023)   Transportation Needs: Unknown (7/25/2023)    PRAPARE - Transportation     Lack of Transportation (Medical): Not on file     Lack of Transportation (Non-Medical): No   Physical Activity: Not on file   Stress: Not on file   Social Connections: Not on file   Intimate Partner Violence: Not on file   Depression: Not at risk (1/30/2024)    PHQ-2     PHQ-2 Score: 0   Housing Stability: Unknown (7/25/2023)    Housing Stability Vital Sign     Unable to Pay for Housing in the Last Year: Not on file     Number of Places Lived in the Last  Year: Not on file     Unstable Housing in the Last Year: No   Interpersonal Safety: Not on file   Utilities: Not on file       PHYSICAL EXAM   Physical Exam  Vitals and nursing note reviewed.   Constitutional:       General: He is not in acute distress.     Appearance: Normal appearance.      Comments: afebrile   Cardiovascular:      Rate and Rhythm: Normal rate.   Pulmonary:      Effort: Pulmonary effort is normal.      Breath sounds: Normal breath sounds.      Comments: No focal adventitious findings on pul exam  Skin:     General: Skin is warm.      Capillary Refill: Capillary refill takes less than 2 seconds.      Findings: No bruising or erythema.   Neurological:      Mental Status: He is alert.         SCREENINGS                  LAB, EKG AND DIAGNOSTIC RESULTS   Labs:  Recent Results (from the past 12 hour(s))   Rapid influenza A/B antigens    Collection Time: 02/17/24 10:15 AM    Specimen: Nasal Washing   Result Value Ref Range    Influenza A Ag Negative Negative      Influenza B Ag Negative Negative         EKG: Not Applicable    Radiologic Studies:  Non-plain film images such as CT, Ultrasound and MRI are read by the radiologist. Plain radiographic images are visualized and preliminarily interpreted by the ED Physician with the following findings: Not Applicable.    Interpretation per the Radiologist below, if available at the time of this note:  XR CHEST (2 VW)   Final Result      Normal PA and lateral chest views.              ED COURSE and DIFFERENTIAL DIAGNOSIS/MDM   11:44 AM DDx, ED Course, and Reassessment: Pt overall reports feeling better but wants to be sure he doesn't have PNA or flu given his upcoming procedure.    Records Reviewed (source and summary of external notes): Prior medical records and Nursing notes    Vitals:    Vitals:    02/17/24 0949   BP: (!) 168/91   Pulse: 79   Resp: 17   Temp: 98.1 °F (36.7 °C)   TempSrc: Oral   SpO2: 98%   Weight: 108.9 kg (240 lb)   Height: 1.803 m (5' 11\")

## 2024-02-19 ENCOUNTER — OFFICE VISIT (OUTPATIENT)
Facility: CLINIC | Age: 64
End: 2024-02-19
Payer: COMMERCIAL

## 2024-02-19 ENCOUNTER — TELEPHONE (OUTPATIENT)
Facility: CLINIC | Age: 64
End: 2024-02-19

## 2024-02-19 VITALS
BODY MASS INDEX: 34.47 KG/M2 | DIASTOLIC BLOOD PRESSURE: 84 MMHG | SYSTOLIC BLOOD PRESSURE: 146 MMHG | HEIGHT: 71 IN | TEMPERATURE: 98.6 F | RESPIRATION RATE: 18 BRPM | WEIGHT: 246.2 LBS | HEART RATE: 86 BPM | OXYGEN SATURATION: 92 %

## 2024-02-19 DIAGNOSIS — M48.07 SPINAL STENOSIS, LUMBOSACRAL REGION: ICD-10-CM

## 2024-02-19 DIAGNOSIS — R03.0 ELEVATED BLOOD PRESSURE READING: ICD-10-CM

## 2024-02-19 DIAGNOSIS — E78.2 MIXED HYPERLIPIDEMIA: ICD-10-CM

## 2024-02-19 DIAGNOSIS — E55.9 VITAMIN D DEFICIENCY, UNSPECIFIED: ICD-10-CM

## 2024-02-19 DIAGNOSIS — Z01.818 PRE-OPERATIVE CLEARANCE: Primary | ICD-10-CM

## 2024-02-19 PROCEDURE — 99214 OFFICE O/P EST MOD 30 MIN: CPT | Performed by: STUDENT IN AN ORGANIZED HEALTH CARE EDUCATION/TRAINING PROGRAM

## 2024-02-19 RX ORDER — ATORVASTATIN CALCIUM 40 MG/1
40 TABLET, FILM COATED ORAL DAILY
Qty: 30 TABLET | Refills: 2 | Status: SHIPPED | OUTPATIENT
Start: 2024-02-19

## 2024-02-19 ASSESSMENT — PATIENT HEALTH QUESTIONNAIRE - PHQ9
8. MOVING OR SPEAKING SO SLOWLY THAT OTHER PEOPLE COULD HAVE NOTICED. OR THE OPPOSITE, BEING SO FIGETY OR RESTLESS THAT YOU HAVE BEEN MOVING AROUND A LOT MORE THAN USUAL: 0
9. THOUGHTS THAT YOU WOULD BE BETTER OFF DEAD, OR OF HURTING YOURSELF: 0
3. TROUBLE FALLING OR STAYING ASLEEP: 0
SUM OF ALL RESPONSES TO PHQ9 QUESTIONS 1 & 2: 0
6. FEELING BAD ABOUT YOURSELF - OR THAT YOU ARE A FAILURE OR HAVE LET YOURSELF OR YOUR FAMILY DOWN: 0
SUM OF ALL RESPONSES TO PHQ QUESTIONS 1-9: 0
SUM OF ALL RESPONSES TO PHQ QUESTIONS 1-9: 0
7. TROUBLE CONCENTRATING ON THINGS, SUCH AS READING THE NEWSPAPER OR WATCHING TELEVISION: 0
10. IF YOU CHECKED OFF ANY PROBLEMS, HOW DIFFICULT HAVE THESE PROBLEMS MADE IT FOR YOU TO DO YOUR WORK, TAKE CARE OF THINGS AT HOME, OR GET ALONG WITH OTHER PEOPLE: 0
5. POOR APPETITE OR OVEREATING: 0
1. LITTLE INTEREST OR PLEASURE IN DOING THINGS: 0
4. FEELING TIRED OR HAVING LITTLE ENERGY: 0
SUM OF ALL RESPONSES TO PHQ QUESTIONS 1-9: 0
2. FEELING DOWN, DEPRESSED OR HOPELESS: 0
SUM OF ALL RESPONSES TO PHQ QUESTIONS 1-9: 0

## 2024-02-19 NOTE — PROGRESS NOTES
\"Have you been to the ER, urgent care clinic since your last visit?  Hospitalized since your last visit?\"    YES - When: approximately 02/17/2024 ago.  Where and Why: [facility] for cough .    “Have you seen or consulted any other health care providers outside of [facility] since your last visit?”    YES - When: approximately 5 days ago.  Where and Why: Was seen at [facility] for preadmission testing.    Chief Complaint   Patient presents with    Pre-op Exam     BP (!) 146/84 (Site: Right Upper Arm, Position: Sitting, Cuff Size: Large Adult)   Pulse 86   Temp 98.6 °F (37 °C) (Oral)   Resp 18   Ht 1.803 m (5' 11\")   Wt 111.7 kg (246 lb 3.2 oz)   SpO2 92%   BMI 34.34 kg/m²            
that time and he was scheduled for a preop visit for clearance today.  In the interim he did end up going to the ER because he was concerned about this, and he did report that he was having some cough.  He had a repeat chest x-ray done which was normal.  In the clinic visit on 1/30/2024 he was complaining of some shortness of breath, he had seen pulmonology before and was given Trelegy which she had not been using as he ran out.  There is also discussion of a possible cardiology referral but he wanted to wait on this.  Trelegy was reordered, he says that his symptoms are better.  He denies any current shortness of breath, chest pain.    Allergies   Allergen Reactions    Fish-Derived Products Nausea And Vomiting     Patient stated baked fish only patient stated can eat grilled fish       Current Outpatient Medications   Medication Sig Dispense Refill    mupirocin (BACTROBAN) 2 % ointment APPLY A PEA-SIZE AMOUNT INTO EACH NOSTRIL TWICE DAILY FOR 5 DAYS      atorvastatin (LIPITOR) 40 MG tablet Take 1 tablet by mouth daily 30 tablet 2    escitalopram (LEXAPRO) 20 MG tablet Take 1 tablet by mouth daily 90 tablet 2    fluticasone-umeclidin-vilant (TRELEGY ELLIPTA) 100-62.5-25 MCG/ACT AEPB inhaler Inhale 1 puff into the lungs daily 28 each 2    albuterol sulfate HFA (PROVENTIL;VENTOLIN;PROAIR) 108 (90 Base) MCG/ACT inhaler Inhale 2 puffs into the lungs every 4 hours as needed for Wheezing or Shortness of Breath 18 g 2    gabapentin (NEURONTIN) 300 MG capsule Take 1 capsule by mouth in the morning and at bedtime for 30 days. Max Daily Amount: 600 mg 60 capsule 0    Cholecalciferol (VITAMIN D3) 125 MCG (5000 UT) CAPS Take 1 capsule by mouth daily 90 capsule 1    fluticasone (FLONASE) 50 MCG/ACT nasal spray 2 sprays by Nasal route daily      loratadine (CLARITIN) 10 MG tablet Take 1 tablet by mouth daily       No current facility-administered medications for this visit.      Past Medical History:   Diagnosis Date    Arthritis

## 2024-02-19 NOTE — TELEPHONE ENCOUNTER
Can you fax over my note along with the recent chest x-ray done in the ER to Dr. Sang Abbott's clinic.  Thanks

## 2024-05-18 ENCOUNTER — HOSPITAL ENCOUNTER (EMERGENCY)
Facility: HOSPITAL | Age: 64
Discharge: HOME | End: 2024-05-18
Attending: STUDENT IN AN ORGANIZED HEALTH CARE EDUCATION/TRAINING PROGRAM
Payer: COMMERCIAL

## 2024-05-18 VITALS
BODY MASS INDEX: 33.59 KG/M2 | HEART RATE: 76 BPM | OXYGEN SATURATION: 100 % | DIASTOLIC BLOOD PRESSURE: 96 MMHG | WEIGHT: 248 LBS | HEIGHT: 72 IN | RESPIRATION RATE: 18 BRPM | TEMPERATURE: 98.4 F | SYSTOLIC BLOOD PRESSURE: 151 MMHG

## 2024-05-18 DIAGNOSIS — R11.0 NAUSEA: Primary | ICD-10-CM

## 2024-05-18 DIAGNOSIS — K29.00 ACUTE SUPERFICIAL GASTRITIS WITHOUT HEMORRHAGE: ICD-10-CM

## 2024-05-18 LAB
ALBUMIN SERPL-MCNC: 3.1 G/DL (ref 3.5–5)
ALBUMIN/GLOB SERPL: 0.8 (ref 1.1–2.2)
ALP SERPL-CCNC: 84 U/L (ref 45–117)
ALT SERPL-CCNC: 36 U/L (ref 12–78)
ANION GAP SERPL CALC-SCNC: 12 MMOL/L (ref 5–15)
AST SERPL W P-5'-P-CCNC: 19 U/L (ref 15–37)
BASOPHILS # BLD: 0.1 K/UL (ref 0–0.1)
BASOPHILS NFR BLD: 1 % (ref 0–1)
BILIRUB SERPL-MCNC: 0.7 MG/DL (ref 0.2–1)
BUN SERPL-MCNC: 17 MG/DL (ref 6–20)
BUN/CREAT SERPL: 17 (ref 12–20)
CA-I BLD-MCNC: 8.2 MG/DL (ref 8.5–10.1)
CHLORIDE SERPL-SCNC: 104 MMOL/L (ref 97–108)
CO2 SERPL-SCNC: 24 MMOL/L (ref 21–32)
CREAT SERPL-MCNC: 1.03 MG/DL (ref 0.7–1.3)
DIFFERENTIAL METHOD BLD: ABNORMAL
EOSINOPHIL # BLD: 0.1 K/UL (ref 0–0.4)
EOSINOPHIL NFR BLD: 1 % (ref 0–7)
ERYTHROCYTE [DISTWIDTH] IN BLOOD BY AUTOMATED COUNT: 13.9 % (ref 11.5–14.5)
GLOBULIN SER CALC-MCNC: 4 G/DL (ref 2–4)
GLUCOSE SERPL-MCNC: 143 MG/DL (ref 65–100)
HCT VFR BLD AUTO: 41.2 % (ref 36.6–50.3)
HGB BLD-MCNC: 13.8 G/DL (ref 12.1–17)
IMM GRANULOCYTES # BLD AUTO: 0 K/UL (ref 0–0.04)
IMM GRANULOCYTES NFR BLD AUTO: 1 % (ref 0–0.5)
LIPASE SERPL-CCNC: 24 U/L (ref 13–75)
LYMPHOCYTES # BLD: 2.4 K/UL (ref 0.8–3.5)
LYMPHOCYTES NFR BLD: 31 % (ref 12–49)
MCH RBC QN AUTO: 29.6 PG (ref 26–34)
MCHC RBC AUTO-ENTMCNC: 33.5 G/DL (ref 30–36.5)
MCV RBC AUTO: 88.4 FL (ref 80–99)
MONOCYTES # BLD: 0.5 K/UL (ref 0–1)
MONOCYTES NFR BLD: 7 % (ref 5–13)
NEUTS SEG # BLD: 4.7 K/UL (ref 1.8–8)
NEUTS SEG NFR BLD: 59 % (ref 32–75)
PLATELET # BLD AUTO: 227 K/UL (ref 150–400)
PMV BLD AUTO: 10.5 FL (ref 8.9–12.9)
POTASSIUM SERPL-SCNC: 3.3 MMOL/L (ref 3.5–5.1)
PROT SERPL-MCNC: 7.1 G/DL (ref 6.4–8.2)
RBC # BLD AUTO: 4.66 M/UL (ref 4.1–5.7)
SODIUM SERPL-SCNC: 140 MMOL/L (ref 136–145)
WBC # BLD AUTO: 7.8 K/UL (ref 4.1–11.1)

## 2024-05-18 PROCEDURE — 96375 TX/PRO/DX INJ NEW DRUG ADDON: CPT

## 2024-05-18 PROCEDURE — 99284 EMERGENCY DEPT VISIT MOD MDM: CPT

## 2024-05-18 PROCEDURE — 2500000003 HC RX 250 WO HCPCS: Performed by: STUDENT IN AN ORGANIZED HEALTH CARE EDUCATION/TRAINING PROGRAM

## 2024-05-18 PROCEDURE — 6360000002 HC RX W HCPCS: Performed by: STUDENT IN AN ORGANIZED HEALTH CARE EDUCATION/TRAINING PROGRAM

## 2024-05-18 PROCEDURE — 93005 ELECTROCARDIOGRAM TRACING: CPT | Performed by: STUDENT IN AN ORGANIZED HEALTH CARE EDUCATION/TRAINING PROGRAM

## 2024-05-18 PROCEDURE — 36415 COLL VENOUS BLD VENIPUNCTURE: CPT

## 2024-05-18 PROCEDURE — 96361 HYDRATE IV INFUSION ADD-ON: CPT

## 2024-05-18 PROCEDURE — 85025 COMPLETE CBC W/AUTO DIFF WBC: CPT

## 2024-05-18 PROCEDURE — 80053 COMPREHEN METABOLIC PANEL: CPT

## 2024-05-18 PROCEDURE — 96374 THER/PROPH/DIAG INJ IV PUSH: CPT

## 2024-05-18 PROCEDURE — 2580000003 HC RX 258: Performed by: STUDENT IN AN ORGANIZED HEALTH CARE EDUCATION/TRAINING PROGRAM

## 2024-05-18 PROCEDURE — 83690 ASSAY OF LIPASE: CPT

## 2024-05-18 RX ORDER — FAMOTIDINE 20 MG/1
20 TABLET, FILM COATED ORAL DAILY
Qty: 30 TABLET | Refills: 3 | Status: SHIPPED | OUTPATIENT
Start: 2024-05-18

## 2024-05-18 RX ORDER — ONDANSETRON 4 MG/1
4 TABLET, ORALLY DISINTEGRATING ORAL EVERY 8 HOURS PRN
Qty: 10 TABLET | Refills: 0 | Status: SHIPPED | OUTPATIENT
Start: 2024-05-18

## 2024-05-18 RX ORDER — 0.9 % SODIUM CHLORIDE 0.9 %
1000 INTRAVENOUS SOLUTION INTRAVENOUS ONCE
Status: COMPLETED | OUTPATIENT
Start: 2024-05-18 | End: 2024-05-18

## 2024-05-18 RX ORDER — ONDANSETRON 2 MG/ML
4 INJECTION INTRAMUSCULAR; INTRAVENOUS ONCE
Status: COMPLETED | OUTPATIENT
Start: 2024-05-18 | End: 2024-05-18

## 2024-05-18 RX ADMIN — SODIUM CHLORIDE 1000 ML: 9 INJECTION, SOLUTION INTRAVENOUS at 14:41

## 2024-05-18 RX ADMIN — SODIUM CHLORIDE, PRESERVATIVE FREE 20 MG: 5 INJECTION INTRAVENOUS at 14:41

## 2024-05-18 RX ADMIN — ONDANSETRON 4 MG: 2 INJECTION INTRAMUSCULAR; INTRAVENOUS at 14:41

## 2024-05-18 ASSESSMENT — PAIN SCALES - GENERAL
PAINLEVEL_OUTOF10: 6
PAINLEVEL_OUTOF10: 9

## 2024-05-18 ASSESSMENT — LIFESTYLE VARIABLES
HOW OFTEN DO YOU HAVE A DRINK CONTAINING ALCOHOL: MONTHLY OR LESS
HOW MANY STANDARD DRINKS CONTAINING ALCOHOL DO YOU HAVE ON A TYPICAL DAY: 3 OR 4

## 2024-05-18 ASSESSMENT — PAIN - FUNCTIONAL ASSESSMENT: PAIN_FUNCTIONAL_ASSESSMENT: 0-10

## 2024-05-18 NOTE — ED PROVIDER NOTES
(7/25/2023)    Overall Financial Resource Strain (CARDIA)     Difficulty of Paying Living Expenses: Not hard at all   Food Insecurity: Not on file (7/25/2023)   Transportation Needs: Unknown (7/25/2023)    PRAPARE - Transportation     Lack of Transportation (Medical): Not on file     Lack of Transportation (Non-Medical): No   Physical Activity: Not on file   Stress: Not on file   Social Connections: Not on file   Intimate Partner Violence: Not on file   Depression: Not at risk (2/19/2024)    PHQ-2     PHQ-2 Score: 0   Housing Stability: Unknown (7/25/2023)    Housing Stability Vital Sign     Unable to Pay for Housing in the Last Year: Not on file     Number of Places Lived in the Last Year: Not on file     Unstable Housing in the Last Year: No   Interpersonal Safety: Not on file   Utilities: Not on file       PHYSICAL EXAM   Physical Exam  Constitutional:       Appearance: Normal appearance. He is normal weight.   HENT:      Head: Normocephalic and atraumatic.      Nose: Nose normal. No congestion.      Mouth/Throat:      Mouth: Mucous membranes are moist.      Pharynx: Oropharynx is clear.   Eyes:      Extraocular Movements: Extraocular movements intact.      Conjunctiva/sclera: Conjunctivae normal.   Cardiovascular:      Rate and Rhythm: Normal rate and regular rhythm.      Pulses: Normal pulses.      Heart sounds: Normal heart sounds.   Pulmonary:      Effort: Pulmonary effort is normal. No respiratory distress.      Breath sounds: Normal breath sounds.   Abdominal:      General: Abdomen is flat. Bowel sounds are normal.      Palpations: Abdomen is soft.      Tenderness: There is no abdominal tenderness. There is no guarding or rebound. Negative signs include Brady's sign and Rovsing's sign.   Musculoskeletal:         General: No swelling or tenderness. Normal range of motion.      Cervical back: Normal range of motion and neck supple.   Skin:     General: Skin is warm.   Neurological:      General: No focal  lipase within normal limits, LFTs within normal limits.    I reassessed patient, still complaining of nausea, declines p.o. ginger ale at this time.  Will continue to observe if continues to be nauseous will treat with Reglan. [PZ]   1606 Patient reassessed states that his epigastrium feels improved nausea has somewhat improved however still complaining of some discomfort in lower abdomen, repeated abdominal exam, abdomen remains soft, no tenderness to palpation, no rebound or guarding.  At this time do not suspect acute intra-abdominal process.  As symptoms have somewhat improved will discharge home with prescription for Reglan, Pepcid, instructed to follow-up with PCP in the next week as needed, additionally will provide patient with GI follow-up information if symptoms do persist over the next week. [PZ]      ED Course User Index  [PZ] Jm Hansen MD       SEPSIS Reassessment: Sepsis reassessment not applicable    Clinical Management Tools:  Not Applicable    Patient was given the following medications:  Medications   sodium chloride 0.9 % bolus 1,000 mL (0 mLs IntraVENous Stopped 5/18/24 1519)   ondansetron (ZOFRAN) injection 4 mg (4 mg IntraVENous Given 5/18/24 1441)   famotidine (PEPCID) 20 mg in sodium chloride (PF) 0.9 % 10 mL injection (20 mg IntraVENous Given 5/18/24 1441)       CONSULTS: See ED Course/MDM for further details.  None     Social Determinants affecting Diagnosis/Treatment: None    Smoking Cessation: Not Applicable    PROCEDURES   Unless otherwise noted above, none.  Procedures      CRITICAL CARE TIME   Patient does not meet Critical Care Time, 0 minutes    ED IMPRESSION     1. Nausea    2. Acute superficial gastritis without hemorrhage          DISPOSITION/PLAN   DISPOSITION Decision To Discharge 05/18/2024 04:08:48 PM    Discharge Note: The patient is stable for discharge home. The signs, symptoms, diagnosis, and discharge instructions have been discussed, understanding conveyed, and

## 2024-05-20 LAB
EKG ATRIAL RATE: 73 BPM
EKG DIAGNOSIS: NORMAL
EKG P AXIS: 35 DEGREES
EKG P-R INTERVAL: 168 MS
EKG Q-T INTERVAL: 388 MS
EKG QRS DURATION: 84 MS
EKG QTC CALCULATION (BAZETT): 427 MS
EKG R AXIS: 10 DEGREES
EKG T AXIS: 26 DEGREES
EKG VENTRICULAR RATE: 73 BPM

## 2024-05-28 ENCOUNTER — TELEPHONE (OUTPATIENT)
Facility: CLINIC | Age: 64
End: 2024-05-28

## 2024-05-28 NOTE — TELEPHONE ENCOUNTER
Patients wife called stated allergies are really getting him down. Would like to know if something can be sent in to the [facility] on [address]

## 2024-06-19 DIAGNOSIS — T78.40XS ALLERGY, SEQUELA: Primary | ICD-10-CM

## 2024-06-19 RX ORDER — LORATADINE 10 MG/1
10 TABLET ORAL DAILY
Qty: 90 TABLET | Refills: 3 | Status: CANCELLED | OUTPATIENT
Start: 2024-06-19

## 2024-06-19 RX ORDER — LORATADINE 10 MG/1
10 TABLET ORAL DAILY
Qty: 30 TABLET | Refills: 2 | Status: SHIPPED | OUTPATIENT
Start: 2024-06-19 | End: 2024-09-17

## 2024-06-19 NOTE — TELEPHONE ENCOUNTER
The patient stated that he is having seasonal allergies like itchy watery eyes and sneezing. He stated that he the loratadine does work for him. I did pend the order so that it can be sent to the pharmacy.

## 2024-07-23 ENCOUNTER — TELEPHONE (OUTPATIENT)
Facility: CLINIC | Age: 64
End: 2024-07-23

## 2024-08-12 ENCOUNTER — TELEPHONE (OUTPATIENT)
Facility: CLINIC | Age: 64
End: 2024-08-12

## 2024-08-12 NOTE — TELEPHONE ENCOUNTER
Spoke with Lali Sim 08/09/2024. Orders were entered through [employer] for cane and shower . Was sent to [facility]. Reached out to the company and they said they sent over documents that needed to be signed by provider. Sent through [place]. Advise we cannot pull up order and asked them to fax documents instead. Per rep they could only send through [facility] [facility] [employer]. They cancelled the order because we hadn't responded. New orders sent to [facility]

## 2024-09-01 DIAGNOSIS — M48.07 SPINAL STENOSIS, LUMBOSACRAL REGION: ICD-10-CM

## 2024-09-03 RX ORDER — GABAPENTIN 300 MG/1
CAPSULE ORAL
Qty: 60 CAPSULE | Refills: 0 | Status: SHIPPED | OUTPATIENT
Start: 2024-09-03 | End: 2024-10-03

## 2024-10-08 ENCOUNTER — APPOINTMENT (OUTPATIENT)
Facility: HOSPITAL | Age: 64
End: 2024-10-08
Payer: COMMERCIAL

## 2024-10-08 ENCOUNTER — HOSPITAL ENCOUNTER (EMERGENCY)
Facility: HOSPITAL | Age: 64
Discharge: HOME | End: 2024-10-08
Attending: FAMILY MEDICINE
Payer: COMMERCIAL

## 2024-10-08 VITALS
WEIGHT: 245 LBS | OXYGEN SATURATION: 98 % | DIASTOLIC BLOOD PRESSURE: 88 MMHG | TEMPERATURE: 98.2 F | HEART RATE: 81 BPM | HEIGHT: 72 IN | SYSTOLIC BLOOD PRESSURE: 149 MMHG | RESPIRATION RATE: 16 BRPM | BODY MASS INDEX: 33.18 KG/M2

## 2024-10-08 DIAGNOSIS — M79.10 MYALGIA: ICD-10-CM

## 2024-10-08 DIAGNOSIS — M25.572 ACUTE LEFT ANKLE PAIN: Primary | ICD-10-CM

## 2024-10-08 PROCEDURE — 73610 X-RAY EXAM OF ANKLE: CPT

## 2024-10-08 PROCEDURE — 6370000000 HC RX 637 (ALT 250 FOR IP): Performed by: FAMILY MEDICINE

## 2024-10-08 PROCEDURE — 99283 EMERGENCY DEPT VISIT LOW MDM: CPT

## 2024-10-08 RX ORDER — ONDANSETRON 4 MG/1
4 TABLET, ORALLY DISINTEGRATING ORAL
Status: COMPLETED | OUTPATIENT
Start: 2024-10-08 | End: 2024-10-08

## 2024-10-08 RX ORDER — PREDNISONE 20 MG/1
20 TABLET ORAL DAILY
Qty: 5 TABLET | Refills: 0 | Status: SHIPPED | OUTPATIENT
Start: 2024-10-08 | End: 2024-10-13

## 2024-10-08 RX ORDER — LIDOCAINE 4 G/G
PATCH TOPICAL
Qty: 5 EACH | Refills: 0 | Status: SHIPPED | OUTPATIENT
Start: 2024-10-08

## 2024-10-08 RX ORDER — ACETAMINOPHEN 500 MG
1000 TABLET ORAL
Status: COMPLETED | OUTPATIENT
Start: 2024-10-08 | End: 2024-10-08

## 2024-10-08 RX ORDER — CYCLOBENZAPRINE HCL 10 MG
10 TABLET ORAL 3 TIMES DAILY PRN
Qty: 15 TABLET | Refills: 0 | Status: SHIPPED | OUTPATIENT
Start: 2024-10-08 | End: 2024-10-18

## 2024-10-08 RX ADMIN — ONDANSETRON 4 MG: 4 TABLET, ORALLY DISINTEGRATING ORAL at 13:19

## 2024-10-08 RX ADMIN — ACETAMINOPHEN 1000 MG: 500 TABLET ORAL at 13:20

## 2024-10-08 ASSESSMENT — PAIN - FUNCTIONAL ASSESSMENT: PAIN_FUNCTIONAL_ASSESSMENT: 0-10

## 2024-10-08 ASSESSMENT — PAIN SCALES - GENERAL: PAINLEVEL_OUTOF10: 8

## 2024-10-08 NOTE — DISCHARGE INSTRUCTIONS
Thank you for choosing our Emergency Department for your care.  It is our privilege to care for you in your time of need.  In the next several days, you may receive a survey via email or mailed to your home about your experience with our team.  We would greatly appreciate you taking a few minutes to complete the survey, as we use this information to learn what we have done well and what we could be doing better. Thank you for trusting us with your care!    Below you will find a list of your tests from today's visit.   Labs  No results found for this or any previous visit (from the past 12 hour(s)).    Radiologic Studies  XR ANKLE LEFT (MIN 3 VIEWS)   Final Result   No acute abnormality.      Electronically signed by ALISIA BRIONES        ------------------------------------------------------------------------------------------------------------  The evaluation and treatment you received in the Emergency Department were for an urgent problem. It is important that you follow-up with a doctor, nurse practitioner, or physician assistant to:  (1) confirm your diagnosis,  (2) re-evaluation of changes in your illness and treatment, and (3) for ongoing care. Please take your discharge instructions with you when you go to your follow-up appointment.     If you have any problem arranging a follow-up appointment, contact us!  If your symptoms become worse or you do not improve as expected, please return to us. We are available 24 hours a day.     If a prescription has been provided, please fill it as soon as possible to prevent a delay in treatment. If you have any questions or reservations about taking the medication due to side effects or interactions with other medications, please call your primary care provider or contact us directly.  Again, THANK YOU for choosing us to care for YOU!

## 2024-10-08 NOTE — ED TRIAGE NOTES
Left ankle pain and lower back pain ongoing for awhile, pain is chronic no meds pta except neurontin no recent injury or trauma

## 2024-10-09 NOTE — ED PROVIDER NOTES
EMERGENCY DEPARTMENT HISTORY AND PHYSICAL EXAM      Date: 10/8/2024  Patient Name: Yann Bob    History of Presenting Illness     Chief Complaint   Patient presents with    Ankle Pain    Back Pain       History Provided By:     HPI: Yann Bob, is a very pleasant 63 y.o. male presenting to the ED with a chief complaint of ankle pain, back pain.  Several months of left ankle pain.  Denies any inciting injury.  Worse over lateral aspect of ankle.  Worse with walking, better with rest.  Also having pain on either side of his lower back.  Denies injury.  No midline back pain.  No numbness tingling or weakness in extremities.  No saddle anesthesia.  No fevers.  No changes in urinating or stooling.    Denies any other symptoms at this time.    PCP: Kenay Ashley MD    No current facility-administered medications on file prior to encounter.     Current Outpatient Medications on File Prior to Encounter   Medication Sig Dispense Refill    gabapentin (NEURONTIN) 300 MG capsule TAKE 1 CAPSULE BY MOUTH IN THE MORNING AND 1 AT BEDTIME FOR 30 DAYS MAX  DAILY  AMOUNT  IS  600  MG 60 capsule 0    ondansetron (ZOFRAN-ODT) 4 MG disintegrating tablet Take 1 tablet by mouth every 8 hours as needed for Nausea or Vomiting 10 tablet 0    famotidine (PEPCID) 20 MG tablet Take 1 tablet by mouth daily 30 tablet 3    mupirocin (BACTROBAN) 2 % ointment APPLY A PEA-SIZE AMOUNT INTO EACH NOSTRIL TWICE DAILY FOR 5 DAYS      atorvastatin (LIPITOR) 40 MG tablet Take 1 tablet by mouth daily 30 tablet 2    escitalopram (LEXAPRO) 20 MG tablet Take 1 tablet by mouth daily 90 tablet 2    fluticasone-umeclidin-vilant (TRELEGY ELLIPTA) 100-62.5-25 MCG/ACT AEPB inhaler Inhale 1 puff into the lungs daily 28 each 2    albuterol sulfate HFA (PROVENTIL;VENTOLIN;PROAIR) 108 (90 Base) MCG/ACT inhaler Inhale 2 puffs into the lungs every 4 hours as needed for Wheezing or Shortness of Breath 18 g 2    Cholecalciferol (VITAMIN D3) 125 MCG (5000 UT) CAPS

## 2025-01-15 DIAGNOSIS — J44.9 CHRONIC OBSTRUCTIVE PULMONARY DISEASE, UNSPECIFIED COPD TYPE (HCC): ICD-10-CM

## 2025-01-15 DIAGNOSIS — M48.07 SPINAL STENOSIS, LUMBOSACRAL REGION: ICD-10-CM

## 2025-01-15 DIAGNOSIS — F32.A ANXIETY AND DEPRESSION: ICD-10-CM

## 2025-01-15 DIAGNOSIS — E78.2 MIXED HYPERLIPIDEMIA: ICD-10-CM

## 2025-01-15 DIAGNOSIS — F41.9 ANXIETY AND DEPRESSION: ICD-10-CM

## 2025-01-16 RX ORDER — ATORVASTATIN CALCIUM 40 MG/1
40 TABLET, FILM COATED ORAL DAILY
Qty: 30 TABLET | Refills: 0 | Status: SHIPPED | OUTPATIENT
Start: 2025-01-16

## 2025-01-16 RX ORDER — ALBUTEROL SULFATE 90 UG/1
2 INHALANT RESPIRATORY (INHALATION) EVERY 6 HOURS PRN
Qty: 18 G | Refills: 0 | Status: SHIPPED | OUTPATIENT
Start: 2025-01-16

## 2025-01-16 RX ORDER — ESCITALOPRAM OXALATE 20 MG/1
20 TABLET ORAL DAILY
Qty: 30 TABLET | Refills: 0 | Status: SHIPPED | OUTPATIENT
Start: 2025-01-16

## 2025-01-16 RX ORDER — FLUTICASONE FUROATE, UMECLIDINIUM BROMIDE AND VILANTEROL TRIFENATATE 100; 62.5; 25 UG/1; UG/1; UG/1
1 POWDER RESPIRATORY (INHALATION) DAILY
Qty: 60 EACH | Refills: 0 | Status: SHIPPED | OUTPATIENT
Start: 2025-01-16

## 2025-01-16 RX ORDER — GABAPENTIN 300 MG/1
300 CAPSULE ORAL 2 TIMES DAILY
Qty: 60 CAPSULE | Refills: 0 | Status: SHIPPED | OUTPATIENT
Start: 2025-01-16 | End: 2025-02-15

## 2025-01-24 ENCOUNTER — HOSPITAL ENCOUNTER (EMERGENCY)
Facility: HOSPITAL | Age: 65
Discharge: HOME | End: 2025-01-24
Attending: EMERGENCY MEDICINE
Payer: COMMERCIAL

## 2025-01-24 VITALS
TEMPERATURE: 98.4 F | WEIGHT: 250 LBS | HEIGHT: 72 IN | DIASTOLIC BLOOD PRESSURE: 78 MMHG | BODY MASS INDEX: 33.86 KG/M2 | HEART RATE: 86 BPM | OXYGEN SATURATION: 100 % | SYSTOLIC BLOOD PRESSURE: 138 MMHG | RESPIRATION RATE: 18 BRPM

## 2025-01-24 DIAGNOSIS — U07.1 COVID-19 VIRUS INFECTION: ICD-10-CM

## 2025-01-24 DIAGNOSIS — J06.9 UPPER RESPIRATORY TRACT INFECTION, UNSPECIFIED TYPE: Primary | ICD-10-CM

## 2025-01-24 LAB
FLUAV RNA SPEC QL NAA+PROBE: NOT DETECTED
FLUBV RNA SPEC QL NAA+PROBE: NOT DETECTED
SARS-COV-2 RNA RESP QL NAA+PROBE: DETECTED

## 2025-01-24 PROCEDURE — 87636 SARSCOV2 & INF A&B AMP PRB: CPT

## 2025-01-24 PROCEDURE — 6370000000 HC RX 637 (ALT 250 FOR IP): Performed by: EMERGENCY MEDICINE

## 2025-01-24 PROCEDURE — 99283 EMERGENCY DEPT VISIT LOW MDM: CPT

## 2025-01-24 RX ORDER — PREDNISONE 20 MG/1
TABLET ORAL
Qty: 12 TABLET | Refills: 1 | Status: SHIPPED | OUTPATIENT
Start: 2025-01-24

## 2025-01-24 RX ORDER — PREDNISONE 20 MG/1
60 TABLET ORAL
Status: COMPLETED | OUTPATIENT
Start: 2025-01-24 | End: 2025-01-24

## 2025-01-24 RX ORDER — AZITHROMYCIN 250 MG/1
TABLET, FILM COATED ORAL
Qty: 1 PACKET | Refills: 0 | Status: SHIPPED | OUTPATIENT
Start: 2025-01-24

## 2025-01-24 RX ORDER — IBUPROFEN 200 MG
400 TABLET ORAL EVERY 8 HOURS PRN
Qty: 20 TABLET | Refills: 0 | Status: SHIPPED | OUTPATIENT
Start: 2025-01-24 | End: 2025-01-31

## 2025-01-24 RX ORDER — ACETAMINOPHEN 500 MG
1000 TABLET ORAL
Status: COMPLETED | OUTPATIENT
Start: 2025-01-24 | End: 2025-01-24

## 2025-01-24 RX ORDER — IBUPROFEN 800 MG/1
800 TABLET, FILM COATED ORAL
Status: COMPLETED | OUTPATIENT
Start: 2025-01-24 | End: 2025-01-24

## 2025-01-24 RX ORDER — ACETAMINOPHEN 500 MG
1000 TABLET ORAL EVERY 8 HOURS PRN
Qty: 360 TABLET | Refills: 1 | Status: SHIPPED | OUTPATIENT
Start: 2025-01-24

## 2025-01-24 RX ORDER — GUAIFENESIN 600 MG/1
1200 TABLET, EXTENDED RELEASE ORAL 2 TIMES DAILY
Qty: 30 TABLET | Refills: 0 | Status: SHIPPED | OUTPATIENT
Start: 2025-01-24 | End: 2025-02-08

## 2025-01-24 RX ADMIN — PREDNISONE 60 MG: 20 TABLET ORAL at 02:49

## 2025-01-24 RX ADMIN — ACETAMINOPHEN 1000 MG: 500 TABLET ORAL at 02:50

## 2025-01-24 RX ADMIN — IBUPROFEN 800 MG: 800 TABLET, FILM COATED ORAL at 02:50

## 2025-01-24 ASSESSMENT — PAIN - FUNCTIONAL ASSESSMENT: PAIN_FUNCTIONAL_ASSESSMENT: 0-10

## 2025-01-24 ASSESSMENT — LIFESTYLE VARIABLES
HOW MANY STANDARD DRINKS CONTAINING ALCOHOL DO YOU HAVE ON A TYPICAL DAY: 1 OR 2
HOW OFTEN DO YOU HAVE A DRINK CONTAINING ALCOHOL: MONTHLY OR LESS

## 2025-01-24 ASSESSMENT — PAIN SCALES - GENERAL: PAINLEVEL_OUTOF10: 9

## 2025-01-24 NOTE — ED TRIAGE NOTES
Pt to ed with cough, congestion, sore throat, and generalized body aches that started 4 days ago.

## 2025-01-24 NOTE — ED PROVIDER NOTES
[facility] EMERGENCY DEPT  EMERGENCY DEPARTMENT HISTORY AND PHYSICAL EXAM      Date: 1/24/2025  Patient Name: Yann Bob  MRN: [id number]  Birthdate 1960  Date of evaluation: 1/24/2025  Provider: Janina Saleh MD  Note Started: 2:54 AM EST 1/24/25    HISTORY OF PRESENT ILLNESS     Chief Complaint   Patient presents with    Cough    Nasal Congestion    Pharyngitis    Generalized Body Aches       History Provided By: Patient    HPI: Yann Bob is a 64 y.o. male.  Patient present with a complaint of generalized body ache/nasal congestion/subjective fever with occasional paroxysmal nonproductive cough for last 4 days.  Patient has several episode of diarrhea which resolved.  No vomiting.  No obvious sick contact however wife with a similar symptoms.  No COVID vaccination.  No chest pain or shortness of breath.  No abdominal pain.        PAST MEDICAL HISTORY   Past Medical History:  Past Medical History:   Diagnosis Date    Arthritis     Chronic pain     back    Gout     Hyperlipidemia     Patient stated he was on medication     Lumbar radiculopathy     Psychiatric disorder     depression     Spinal stenosis        Past Surgical History:  Past Surgical History:   Procedure Laterality Date    COLONOSCOPY      HERNIA REPAIR      twice    ORTHOPEDIC SURGERY      right hand trigger finger release        Family History:  Family History   Problem Relation Age of Onset    Diabetes Sister     Cancer Mother     Heart Disease Brother     Alcohol Abuse Father        Social History:  Social History     Tobacco Use    Smoking status: Every Day     Current packs/day: 0.25     Average packs/day: 0.3 packs/day for 46.1 years (11.5 ttl pk-yrs)     Types: Cigarettes     Start date: 1979     Passive exposure: Current    Smokeless tobacco: Never   Vaping Use    Vaping status: Never Used   Substance Use Topics    Alcohol use: Yes     Comment: occasionally    Drug use: Never       Allergies:  Allergies   Allergen Reactions

## 2025-02-02 ENCOUNTER — HOSPITAL ENCOUNTER (EMERGENCY)
Facility: HOSPITAL | Age: 65
Discharge: HOME | End: 2025-02-02
Attending: EMERGENCY MEDICINE
Payer: COMMERCIAL

## 2025-02-02 VITALS
OXYGEN SATURATION: 98 % | BODY MASS INDEX: 32.51 KG/M2 | SYSTOLIC BLOOD PRESSURE: 178 MMHG | HEIGHT: 72 IN | WEIGHT: 240 LBS | DIASTOLIC BLOOD PRESSURE: 100 MMHG | RESPIRATION RATE: 18 BRPM | HEART RATE: 84 BPM | TEMPERATURE: 98 F

## 2025-02-02 DIAGNOSIS — I10 UNCONTROLLED HYPERTENSION: ICD-10-CM

## 2025-02-02 DIAGNOSIS — J06.9 UPPER RESPIRATORY TRACT INFECTION, UNSPECIFIED TYPE: Primary | ICD-10-CM

## 2025-02-02 PROCEDURE — 99283 EMERGENCY DEPT VISIT LOW MDM: CPT

## 2025-02-02 RX ORDER — GUAIFENESIN 600 MG/1
1200 TABLET, EXTENDED RELEASE ORAL 2 TIMES DAILY
Qty: 30 TABLET | Refills: 0 | Status: SHIPPED | OUTPATIENT
Start: 2025-02-02 | End: 2025-02-17

## 2025-02-02 RX ORDER — ALBUTEROL SULFATE 90 UG/1
2 INHALANT RESPIRATORY (INHALATION) 4 TIMES DAILY PRN
Qty: 54 G | Refills: 1 | Status: SHIPPED | OUTPATIENT
Start: 2025-02-02

## 2025-02-02 RX ORDER — PREDNISONE 20 MG/1
TABLET ORAL
Qty: 12 TABLET | Refills: 1 | Status: SHIPPED | OUTPATIENT
Start: 2025-02-02

## 2025-02-02 ASSESSMENT — PAIN SCALES - GENERAL: PAINLEVEL_OUTOF10: 0

## 2025-02-02 ASSESSMENT — PAIN - FUNCTIONAL ASSESSMENT: PAIN_FUNCTIONAL_ASSESSMENT: 0-10

## 2025-02-02 ASSESSMENT — LIFESTYLE VARIABLES
HOW OFTEN DO YOU HAVE A DRINK CONTAINING ALCOHOL: NEVER
HOW MANY STANDARD DRINKS CONTAINING ALCOHOL DO YOU HAVE ON A TYPICAL DAY: PATIENT DOES NOT DRINK

## 2025-02-02 NOTE — ED PROVIDER NOTES
[facility] EMERGENCY DEPT  EMERGENCY DEPARTMENT HISTORY AND PHYSICAL EXAM      Date: 2/2/2025  Patient Name: Yann Bob  MRN: [id number]  Birthdate 1960  Date of evaluation: 2/2/2025  Provider: Janina Saleh MD  Note Started: 5:58 AM EST 2/2/25    HISTORY OF PRESENT ILLNESS     Chief Complaint   Patient presents with    Cough       History Provided By: Patient    HPI: Yann Bob is a 64 y.o. male.  Patient presents with a complaint of persistent proximal nonproductive cough for 1 week.  Patient was seen here for the same and tested positive for COVID-19.  Patient states the coughing improved during course of prednisone and Mucinex however medicine ran out.  No shortness of breath.  No chest pain.  No headache.  No vomiting or diarrhea.         PAST MEDICAL HISTORY   Past Medical History:  Past Medical History:   Diagnosis Date    Arthritis     Chronic pain     back    Gout     Hyperlipidemia     Patient stated he was on medication     Hypertension     Lumbar radiculopathy     Psychiatric disorder     depression     Spinal stenosis        Past Surgical History:  Past Surgical History:   Procedure Laterality Date    COLONOSCOPY      HERNIA REPAIR      twice    ORTHOPEDIC SURGERY      right hand trigger finger release        Family History:  Family History   Problem Relation Age of Onset    Diabetes Sister     Cancer Mother     Heart Disease Brother     Alcohol Abuse Father        Social History:  Social History     Tobacco Use    Smoking status: Every Day     Current packs/day: 0.25     Average packs/day: 0.2 packs/day for 46.1 years (11.5 ttl pk-yrs)     Types: Cigarettes     Start date: 1979     Passive exposure: Current    Smokeless tobacco: Never   Vaping Use    Vaping status: Never Used   Substance Use Topics    Alcohol use: Yes     Comment: occasionally    Drug use: Never       Allergies:  Allergies   Allergen Reactions    Fish-Derived Products Nausea And Vomiting     Patient stated baked fish only

## 2025-02-02 NOTE — ED TRIAGE NOTES
Pt presents to the ED and states was here a week ago and tested positive for covid and wants to be retested to see if he still has it. States feels okay, just wants to be sure it's gone.

## 2025-03-28 ENCOUNTER — OFFICE VISIT (OUTPATIENT)
Facility: CLINIC | Age: 65
End: 2025-03-28
Payer: COMMERCIAL

## 2025-03-28 VITALS
WEIGHT: 251.8 LBS | HEART RATE: 72 BPM | OXYGEN SATURATION: 95 % | BODY MASS INDEX: 34.1 KG/M2 | SYSTOLIC BLOOD PRESSURE: 146 MMHG | RESPIRATION RATE: 18 BRPM | DIASTOLIC BLOOD PRESSURE: 84 MMHG | TEMPERATURE: 98.2 F | HEIGHT: 72 IN

## 2025-03-28 DIAGNOSIS — I10 PRIMARY HYPERTENSION: ICD-10-CM

## 2025-03-28 DIAGNOSIS — F32.A ANXIETY AND DEPRESSION: ICD-10-CM

## 2025-03-28 DIAGNOSIS — Z12.5 SCREENING FOR PROSTATE CANCER: ICD-10-CM

## 2025-03-28 DIAGNOSIS — E78.2 MIXED HYPERLIPIDEMIA: ICD-10-CM

## 2025-03-28 DIAGNOSIS — M54.16 LUMBAR RADICULOPATHY: ICD-10-CM

## 2025-03-28 DIAGNOSIS — M48.07 SPINAL STENOSIS, LUMBOSACRAL REGION: Primary | ICD-10-CM

## 2025-03-28 DIAGNOSIS — Z12.11 SCREENING FOR COLON CANCER: ICD-10-CM

## 2025-03-28 DIAGNOSIS — F41.9 ANXIETY AND DEPRESSION: ICD-10-CM

## 2025-03-28 DIAGNOSIS — J44.9 CHRONIC OBSTRUCTIVE PULMONARY DISEASE, UNSPECIFIED COPD TYPE (HCC): ICD-10-CM

## 2025-03-28 PROCEDURE — 99214 OFFICE O/P EST MOD 30 MIN: CPT | Performed by: STUDENT IN AN ORGANIZED HEALTH CARE EDUCATION/TRAINING PROGRAM

## 2025-03-28 PROCEDURE — 3079F DIAST BP 80-89 MM HG: CPT | Performed by: STUDENT IN AN ORGANIZED HEALTH CARE EDUCATION/TRAINING PROGRAM

## 2025-03-28 PROCEDURE — 3077F SYST BP >= 140 MM HG: CPT | Performed by: STUDENT IN AN ORGANIZED HEALTH CARE EDUCATION/TRAINING PROGRAM

## 2025-03-28 RX ORDER — KETOCONAZOLE 20 MG/G
CREAM TOPICAL
COMMUNITY
Start: 2025-01-16

## 2025-03-28 RX ORDER — ESCITALOPRAM OXALATE 20 MG/1
20 TABLET ORAL DAILY
Qty: 30 TABLET | Refills: 0 | Status: SHIPPED | OUTPATIENT
Start: 2025-03-28

## 2025-03-28 RX ORDER — ALBUTEROL SULFATE 90 UG/1
2 INHALANT RESPIRATORY (INHALATION) 4 TIMES DAILY PRN
Qty: 54 G | Refills: 1 | Status: SHIPPED | OUTPATIENT
Start: 2025-03-28

## 2025-03-28 RX ORDER — FLUTICASONE FUROATE, UMECLIDINIUM BROMIDE AND VILANTEROL TRIFENATATE 100; 62.5; 25 UG/1; UG/1; UG/1
1 POWDER RESPIRATORY (INHALATION) DAILY
Qty: 60 EACH | Refills: 0 | Status: SHIPPED | OUTPATIENT
Start: 2025-03-28

## 2025-03-28 RX ORDER — LORATADINE 10 MG/1
10 TABLET ORAL DAILY
COMMUNITY
Start: 2025-01-16

## 2025-03-28 RX ORDER — AMMONIUM LACTATE 12 G/100G
LOTION TOPICAL
COMMUNITY
Start: 2025-01-16

## 2025-03-28 RX ORDER — GABAPENTIN 300 MG/1
300 CAPSULE ORAL 3 TIMES DAILY
Qty: 90 CAPSULE | Refills: 2 | Status: SHIPPED | OUTPATIENT
Start: 2025-03-28 | End: 2025-06-26

## 2025-03-28 RX ORDER — ATORVASTATIN CALCIUM 40 MG/1
40 TABLET, FILM COATED ORAL DAILY
Qty: 30 TABLET | Refills: 0 | Status: SHIPPED | OUTPATIENT
Start: 2025-03-28

## 2025-03-28 RX ORDER — AMLODIPINE BESYLATE 5 MG/1
5 TABLET ORAL DAILY
Qty: 90 TABLET | Refills: 0 | Status: SHIPPED | OUTPATIENT
Start: 2025-03-28

## 2025-03-28 SDOH — ECONOMIC STABILITY: FOOD INSECURITY: WITHIN THE PAST 12 MONTHS, THE FOOD YOU BOUGHT JUST DIDN'T LAST AND YOU DIDN'T HAVE MONEY TO GET MORE.: NEVER TRUE

## 2025-03-28 SDOH — ECONOMIC STABILITY: FOOD INSECURITY: WITHIN THE PAST 12 MONTHS, YOU WORRIED THAT YOUR FOOD WOULD RUN OUT BEFORE YOU GOT MONEY TO BUY MORE.: NEVER TRUE

## 2025-03-28 ASSESSMENT — PATIENT HEALTH QUESTIONNAIRE - PHQ9
1. LITTLE INTEREST OR PLEASURE IN DOING THINGS: NOT AT ALL
8. MOVING OR SPEAKING SO SLOWLY THAT OTHER PEOPLE COULD HAVE NOTICED. OR THE OPPOSITE, BEING SO FIGETY OR RESTLESS THAT YOU HAVE BEEN MOVING AROUND A LOT MORE THAN USUAL: NOT AT ALL
10. IF YOU CHECKED OFF ANY PROBLEMS, HOW DIFFICULT HAVE THESE PROBLEMS MADE IT FOR YOU TO DO YOUR WORK, TAKE CARE OF THINGS AT HOME, OR GET ALONG WITH OTHER PEOPLE: NOT DIFFICULT AT ALL
9. THOUGHTS THAT YOU WOULD BE BETTER OFF DEAD, OR OF HURTING YOURSELF: NOT AT ALL
SUM OF ALL RESPONSES TO PHQ QUESTIONS 1-9: 0
2. FEELING DOWN, DEPRESSED OR HOPELESS: NOT AT ALL
SUM OF ALL RESPONSES TO PHQ QUESTIONS 1-9: 0
5. POOR APPETITE OR OVEREATING: NOT AT ALL
7. TROUBLE CONCENTRATING ON THINGS, SUCH AS READING THE NEWSPAPER OR WATCHING TELEVISION: NOT AT ALL
SUM OF ALL RESPONSES TO PHQ QUESTIONS 1-9: 0
4. FEELING TIRED OR HAVING LITTLE ENERGY: NOT AT ALL
3. TROUBLE FALLING OR STAYING ASLEEP: NOT AT ALL
6. FEELING BAD ABOUT YOURSELF - OR THAT YOU ARE A FAILURE OR HAVE LET YOURSELF OR YOUR FAMILY DOWN: NOT AT ALL
SUM OF ALL RESPONSES TO PHQ QUESTIONS 1-9: 0

## 2025-03-28 ASSESSMENT — ENCOUNTER SYMPTOMS
COUGH: 0
ABDOMINAL PAIN: 0
SHORTNESS OF BREATH: 0

## 2025-03-28 NOTE — PROGRESS NOTES
Yann Bob (: 1960) is a 64 y.o. male, Established patient patient, here for evaluation of the following chief complaint(s):  Follow-up Chronic Condition       ASSESSMENT/PLAN:  Below is the assessment and plan developed based on review of pertinent history, physical exam, labs, studies, and medications.    1. Spinal stenosis, lumbosacral region  -     gabapentin (NEURONTIN) 300 MG capsule; Take 1 capsule by mouth 3 times daily for 90 days. Max Daily Amount: 900 mg, Disp-90 capsule, R-2Normal  2. Lumbar radiculopathy  -     gabapentin (NEURONTIN) 300 MG capsule; Take 1 capsule by mouth 3 times daily for 90 days. Max Daily Amount: 900 mg, Disp-90 capsule, R-2Normal  3. Anxiety and depression  -     escitalopram (LEXAPRO) 20 MG tablet; Take 1 tablet by mouth daily, Disp-30 tablet, R-0Normal  4. Chronic obstructive pulmonary disease, unspecified COPD type (HCC)  -     albuterol sulfate HFA (VENTOLIN HFA) 108 (90 Base) MCG/ACT inhaler; Inhale 2 puffs into the lungs 4 times daily as needed for Wheezing, Disp-54 g, R-1Normal  -     fluticasone-umeclidin-vilant (TRELEGY ELLIPTA) 100-62.5-25 MCG/ACT AEPB inhaler; Inhale 1 puff into the lungs daily Courtesy refill given on behalf of PCP Dr Ashley, Disp-60 each, R-0Normal  5. Mixed hyperlipidemia  -     atorvastatin (LIPITOR) 40 MG tablet; Take 1 tablet by mouth daily Courtesy refill given on behalf of PCP Dr Ashley, Disp-30 tablet, R-0Normal  -     Lipid Panel; Future  6. Primary hypertension  -     CBC; Future  -     Comprehensive Metabolic Panel; Future  -     Lipid Panel; Future  -     T4, Free; Future  -     TSH; Future  -     amLODIPine (NORVASC) 5 MG tablet; Take 1 tablet by mouth daily, Disp-90 tablet, R-0Normal  7. Screening for colon cancer  -     Cologuard (Fecal DNA Colorectal Cancer Screening); Future  8. Screening for prostate cancer  -     PSA Screening; Future      New onset hypertension, his blood pressure readings have been high during ER

## 2025-03-28 NOTE — PROGRESS NOTES
\"Have you been to the ER, urgent care clinic since your last visit?  Hospitalized since your last visit?\"    YES - When: approximately 02/02/2025 ago.  Where and Why: [facility] for URI.    “Have you seen or consulted any other health care providers outside of [facility] since your last visit?”    NO    Chief Complaint   Patient presents with    Follow-up Chronic Condition     BP (!) 153/86 (BP Site: Left Upper Arm, Patient Position: Sitting, BP Cuff Size: Medium Adult)   Pulse 75   Temp 98.2 °F (36.8 °C) (Temporal)   Resp 18   Ht 1.829 m (6')   Wt 114.2 kg (251 lb 12.8 oz)   SpO2 95%   BMI 34.15 kg/m²             Click Here for Release of Records Request

## 2025-05-09 LAB — NONINV COLON CA DNA+OCC BLD SCRN STL QL: POSITIVE

## 2025-05-12 ENCOUNTER — TELEPHONE (OUTPATIENT)
Facility: CLINIC | Age: 65
End: 2025-05-12

## 2025-05-12 ENCOUNTER — RESULTS FOLLOW-UP (OUTPATIENT)
Facility: CLINIC | Age: 65
End: 2025-05-12

## 2025-05-12 DIAGNOSIS — R19.5 POSITIVE COLORECTAL CANCER SCREENING USING COLOGUARD TEST: Primary | ICD-10-CM

## 2025-05-12 NOTE — TELEPHONE ENCOUNTER
Advised patient of positive cologuard and the need to see Neftali Vick and advised I would send over referral and positive cologuard results and they could call and schedule appointment. DANIEL ATKINSON

## 2025-05-13 ENCOUNTER — HOSPITAL ENCOUNTER (EMERGENCY)
Facility: HOSPITAL | Age: 65
Discharge: HOME | End: 2025-05-13
Payer: COMMERCIAL

## 2025-05-13 VITALS
RESPIRATION RATE: 20 BRPM | WEIGHT: 259 LBS | HEART RATE: 73 BPM | HEIGHT: 72 IN | BODY MASS INDEX: 35.08 KG/M2 | DIASTOLIC BLOOD PRESSURE: 93 MMHG | OXYGEN SATURATION: 98 % | TEMPERATURE: 98.1 F | SYSTOLIC BLOOD PRESSURE: 150 MMHG

## 2025-05-13 DIAGNOSIS — G89.29 ACUTE EXACERBATION OF CHRONIC LOW BACK PAIN: Primary | ICD-10-CM

## 2025-05-13 DIAGNOSIS — M54.50 ACUTE EXACERBATION OF CHRONIC LOW BACK PAIN: Primary | ICD-10-CM

## 2025-05-13 DIAGNOSIS — R11.0 NAUSEA: ICD-10-CM

## 2025-05-13 LAB
APPEARANCE UR: CLEAR
BACTERIA URNS QL MICRO: NEGATIVE /HPF
BILIRUB UR QL: NEGATIVE
COLOR UR: ABNORMAL
EPITH CASTS URNS QL MICRO: ABNORMAL /LPF
GLUCOSE UR STRIP.AUTO-MCNC: NEGATIVE MG/DL
HGB UR QL STRIP: NEGATIVE
KETONES UR QL STRIP.AUTO: NEGATIVE MG/DL
LEUKOCYTE ESTERASE UR QL STRIP.AUTO: NEGATIVE
NITRITE UR QL STRIP.AUTO: NEGATIVE
PH UR STRIP: 5 (ref 5–8)
PROT UR STRIP-MCNC: NEGATIVE MG/DL
RBC #/AREA URNS HPF: ABNORMAL /HPF (ref 0–5)
SP GR UR REFRACTOMETRY: 1.01 (ref 1–1.03)
URINE CULTURE IF INDICATED: ABNORMAL
UROBILINOGEN UR QL STRIP.AUTO: 0.1 EU/DL (ref 0.2–1)
WBC URNS QL MICRO: ABNORMAL /HPF (ref 0–4)

## 2025-05-13 PROCEDURE — 6370000000 HC RX 637 (ALT 250 FOR IP): Performed by: NURSE PRACTITIONER

## 2025-05-13 PROCEDURE — 99283 EMERGENCY DEPT VISIT LOW MDM: CPT

## 2025-05-13 PROCEDURE — 81001 URINALYSIS AUTO W/SCOPE: CPT

## 2025-05-13 RX ORDER — METHOCARBAMOL 500 MG/1
500 TABLET, FILM COATED ORAL ONCE
Status: COMPLETED | OUTPATIENT
Start: 2025-05-13 | End: 2025-05-13

## 2025-05-13 RX ORDER — PREDNISONE 20 MG/1
40 TABLET ORAL
Status: COMPLETED | OUTPATIENT
Start: 2025-05-13 | End: 2025-05-13

## 2025-05-13 RX ORDER — NAPROXEN 500 MG/1
500 TABLET ORAL
Status: COMPLETED | OUTPATIENT
Start: 2025-05-13 | End: 2025-05-13

## 2025-05-13 RX ORDER — METHOCARBAMOL 500 MG/1
500 TABLET, FILM COATED ORAL 4 TIMES DAILY PRN
Qty: 20 TABLET | Refills: 0 | Status: SHIPPED | OUTPATIENT
Start: 2025-05-13 | End: 2025-05-18

## 2025-05-13 RX ORDER — LIDOCAINE 50 MG/G
1 PATCH TOPICAL EVERY 24 HOURS
Qty: 30 PATCH | Refills: 0 | Status: SHIPPED | OUTPATIENT
Start: 2025-05-13 | End: 2025-06-12

## 2025-05-13 RX ORDER — NAPROXEN 500 MG/1
500 TABLET ORAL 2 TIMES DAILY PRN
Qty: 20 TABLET | Refills: 0 | Status: SHIPPED | OUTPATIENT
Start: 2025-05-13 | End: 2025-05-23

## 2025-05-13 RX ORDER — PREDNISONE 50 MG/1
50 TABLET ORAL DAILY
Qty: 5 TABLET | Refills: 0 | Status: SHIPPED | OUTPATIENT
Start: 2025-05-13 | End: 2025-05-18

## 2025-05-13 RX ORDER — ONDANSETRON 4 MG/1
4 TABLET, ORALLY DISINTEGRATING ORAL ONCE
Status: COMPLETED | OUTPATIENT
Start: 2025-05-13 | End: 2025-05-13

## 2025-05-13 RX ORDER — ONDANSETRON 4 MG/1
4 TABLET, FILM COATED ORAL 3 TIMES DAILY PRN
Qty: 30 TABLET | Refills: 0 | Status: SHIPPED | OUTPATIENT
Start: 2025-05-13

## 2025-05-13 RX ADMIN — PREDNISONE 40 MG: 20 TABLET ORAL at 10:02

## 2025-05-13 RX ADMIN — ONDANSETRON 4 MG: 4 TABLET, ORALLY DISINTEGRATING ORAL at 10:02

## 2025-05-13 RX ADMIN — METHOCARBAMOL 500 MG: 500 TABLET ORAL at 10:02

## 2025-05-13 RX ADMIN — NAPROXEN 500 MG: 500 TABLET ORAL at 10:02

## 2025-05-13 ASSESSMENT — PAIN DESCRIPTION - LOCATION: LOCATION: BACK;LEG

## 2025-05-13 ASSESSMENT — PAIN SCALES - GENERAL: PAINLEVEL_OUTOF10: 9

## 2025-05-13 ASSESSMENT — PAIN DESCRIPTION - ORIENTATION: ORIENTATION: RIGHT;LEFT

## 2025-05-13 NOTE — ED PROVIDER NOTES
Sepsis criteria after ED workup    Patient does not meet Critical Care Time, 0 minutes  CLINICAL IMPRESSIONS     1. Acute exacerbation of chronic low back pain    2. Nausea       SDOH/DISPOSITION/PLAN   Social Determinants affecting Treatment Plan: None    DISPOSITION  05/13/2025 10:27:52 AM             Discharge Note: The patient is stable for discharge home. The signs, symptoms, diagnosis, and discharge instructions have been discussed, understanding conveyed, and agreed upon. The patient is to follow up as recommended or return to ER should their symptoms worsen.      PATIENT REFERRED TO:  Kenya Ashley MD  [address]  [address] [facility] [address]  [phone]          [facility]  [address] [address] [phone]    As needed, If symptoms worsen        DISCHARGE MEDICATIONS:     Medication List        START taking these medications      lidocaine 5 %  Commonly known as: LIDODERM  Place 1 patch onto the skin every 24 hours Place 1 patch onto the skin daily 12 hours on, 12 hours off.     methocarbamol 500 MG tablet  Commonly known as: ROBAXIN  Take 1 tablet by mouth 4 times daily as needed (Muscle spasms)     naproxen 500 MG tablet  Commonly known as: NAPROSYN  Take 1 tablet by mouth 2 times daily as needed for Pain (with meals)     ondansetron 4 MG tablet  Commonly known as: ZOFRAN  Take 1 tablet by mouth 3 times daily as needed for Nausea or Vomiting     predniSONE 50 MG tablet  Commonly known as: DELTASONE  Take 1 tablet by mouth daily for 5 days            ASK your doctor about these medications      acetaminophen 500 MG tablet  Commonly known as: TYLENOL  Take 2 tablets by mouth every 8 hours as needed for Fever or Pain     albuterol sulfate  (90 Base) MCG/ACT inhaler  Commonly known as: Ventolin HFA  Inhale 2 puffs into the lungs 4 times daily as needed for Wheezing     amLODIPine 5 MG tablet  Commonly known as: NORVASC  Take 1

## 2025-05-13 NOTE — ED TRIAGE NOTES
Lower back pain, bilateral leg pain for a while but is worse intermittently and blood in semen recently

## 2025-06-12 ENCOUNTER — HOSPITAL ENCOUNTER (EMERGENCY)
Facility: HOSPITAL | Age: 65
Discharge: HOME | End: 2025-06-12
Payer: COMMERCIAL

## 2025-06-12 ENCOUNTER — APPOINTMENT (OUTPATIENT)
Facility: HOSPITAL | Age: 65
End: 2025-06-12
Payer: COMMERCIAL

## 2025-06-12 VITALS
RESPIRATION RATE: 18 BRPM | SYSTOLIC BLOOD PRESSURE: 142 MMHG | WEIGHT: 259 LBS | OXYGEN SATURATION: 96 % | TEMPERATURE: 97.5 F | HEIGHT: 72 IN | BODY MASS INDEX: 35.08 KG/M2 | DIASTOLIC BLOOD PRESSURE: 78 MMHG | HEART RATE: 91 BPM

## 2025-06-12 DIAGNOSIS — M25.521 RIGHT ELBOW PAIN: Primary | ICD-10-CM

## 2025-06-12 DIAGNOSIS — M25.562 ACUTE PAIN OF LEFT KNEE: ICD-10-CM

## 2025-06-12 PROCEDURE — 73080 X-RAY EXAM OF ELBOW: CPT

## 2025-06-12 PROCEDURE — 96372 THER/PROPH/DIAG INJ SC/IM: CPT

## 2025-06-12 PROCEDURE — 6370000000 HC RX 637 (ALT 250 FOR IP): Performed by: PHYSICIAN ASSISTANT

## 2025-06-12 PROCEDURE — 99284 EMERGENCY DEPT VISIT MOD MDM: CPT

## 2025-06-12 PROCEDURE — 73562 X-RAY EXAM OF KNEE 3: CPT

## 2025-06-12 PROCEDURE — 6360000002 HC RX W HCPCS: Performed by: PHYSICIAN ASSISTANT

## 2025-06-12 RX ORDER — MELOXICAM 15 MG/1
15 TABLET ORAL DAILY
Qty: 30 TABLET | Refills: 0 | Status: SHIPPED | OUTPATIENT
Start: 2025-06-12

## 2025-06-12 RX ORDER — KETOROLAC TROMETHAMINE 30 MG/ML
30 INJECTION, SOLUTION INTRAMUSCULAR; INTRAVENOUS
Status: COMPLETED | OUTPATIENT
Start: 2025-06-12 | End: 2025-06-12

## 2025-06-12 RX ORDER — TRAMADOL HYDROCHLORIDE 50 MG/1
50 TABLET ORAL EVERY 6 HOURS PRN
Qty: 12 TABLET | Refills: 0 | Status: SHIPPED | OUTPATIENT
Start: 2025-06-12 | End: 2025-06-15

## 2025-06-12 RX ORDER — HYDROCODONE BITARTRATE AND ACETAMINOPHEN 5; 325 MG/1; MG/1
1 TABLET ORAL
Refills: 0 | Status: COMPLETED | OUTPATIENT
Start: 2025-06-12 | End: 2025-06-12

## 2025-06-12 RX ADMIN — HYDROCODONE BITARTRATE AND ACETAMINOPHEN 1 TABLET: 5; 325 TABLET ORAL at 13:45

## 2025-06-12 RX ADMIN — KETOROLAC TROMETHAMINE 30 MG: 30 INJECTION, SOLUTION INTRAMUSCULAR at 12:58

## 2025-06-12 ASSESSMENT — PAIN SCALES - GENERAL: PAINLEVEL_OUTOF10: 9

## 2025-06-12 NOTE — ED PROVIDER NOTES
[facility] EMERGENCY DEPT  EMERGENCY DEPARTMENT HISTORY AND PHYSICAL EXAM      Date of evaluation: 6/12/2025  Patient Name: Yann Bob  Birthdate 1960  MRN: [id number]  ED Provider: Dev Christiansen PA-C   Note Started: 12:17 PM EDT 6/12/25    HISTORY OF PRESENT ILLNESS     Chief Complaint   Patient presents with    Knee Pain    Elbow Pain       History Provided By: Patient, only     HPI: Yann Bob is a 64 y.o. male with a past medical history as listed below presents to this ED with cc of right elbow and left knee pain.  Patient reports a 2-week history of symptoms.  Denies any precipitating injury or trauma.  Has not tried treating his symptoms with anything today.  No fevers or chills.  No rashes.  Denies any associated complaints or modifying factors of symptoms.  Otherwise well.    PAST MEDICAL HISTORY   Past Medical History:  Past Medical History:   Diagnosis Date    Arthritis     Chronic pain     back    Gout     Hyperlipidemia     Patient stated he was on medication     Hypertension     Lumbar radiculopathy     Psychiatric disorder     depression     Spinal stenosis        Past Surgical History:  Past Surgical History:   Procedure Laterality Date    COLONOSCOPY      HERNIA REPAIR      twice    ORTHOPEDIC SURGERY      right hand trigger finger release        Family History:  Family History   Problem Relation Age of Onset    Diabetes Sister     Cancer Mother     Heart Disease Brother     Alcohol Abuse Father        Social History:  Social History     Tobacco Use    Smoking status: Every Day     Current packs/day: 0.25     Average packs/day: 0.3 packs/day for 46.4 years (11.6 ttl pk-yrs)     Types: Cigarettes     Start date: 1979     Passive exposure: Current    Smokeless tobacco: Never   Vaping Use    Vaping status: Never Used   Substance Use Topics    Alcohol use: Yes     Comment: occasionally    Drug use: Never       Allergies:  Allergies   Allergen Reactions    Fish-Derived Products Nausea And Vomiting     Moved in the Last Year: 0     Homeless in the Last Year: No   Interpersonal Safety: Not At Risk (2/2/2025)    Interpersonal Safety Domain Source: IP Abuse Screening     Physical abuse: Denies     Verbal abuse: Denies     Emotional abuse: Denies     Financial abuse: Denies     Sexual abuse: Denies   Utilities: Not At Risk (3/28/2025)    [facility] Utilities     Threatened with loss of utilities: No     PHYSICAL EXAM   Physical Exam  Vitals and nursing note reviewed.   Constitutional:       General: He is not in acute distress.     Appearance: Normal appearance. He is morbidly obese. He is not toxic-appearing.   HENT:      Head: Normocephalic and atraumatic.      Mouth/Throat:      Pharynx: Oropharynx is clear. Uvula midline. No pharyngeal swelling.      Tonsils: No tonsillar exudate.   Eyes:      Extraocular Movements: Extraocular movements intact.      Conjunctiva/sclera: Conjunctivae normal.   Cardiovascular:      Rate and Rhythm: Normal rate and regular rhythm.      Pulses: Normal pulses.           Radial pulses are 2+ on the right side and 2+ on the left side.      Heart sounds: No murmur heard.     No friction rub. No gallop.   Pulmonary:      Effort: Pulmonary effort is normal.      Breath sounds: Normal breath sounds. No wheezing, rhonchi or rales.   Musculoskeletal:      Right shoulder: Normal.      Right upper arm: Normal.      Right elbow: No swelling, deformity or effusion. Normal range of motion. Tenderness present in medial epicondyle.      Right forearm: Normal.      Right wrist: Normal.      Right hand: Normal.      Cervical back: Normal, normal range of motion and neck supple. No deformity or bony tenderness.      Thoracic back: Normal. No deformity or bony tenderness.      Left hip: Normal.      Left upper leg: Normal.      Left knee: No swelling, deformity, effusion, erythema or bony tenderness. Normal range of motion. Tenderness present. No LCL laxity or MCL laxity.     Instability Tests: Anterior drawer

## 2025-08-10 ENCOUNTER — HOSPITAL ENCOUNTER (EMERGENCY)
Facility: HOSPITAL | Age: 65
Discharge: HOME | End: 2025-08-10
Payer: COMMERCIAL

## 2025-08-10 VITALS
DIASTOLIC BLOOD PRESSURE: 70 MMHG | HEIGHT: 72 IN | SYSTOLIC BLOOD PRESSURE: 119 MMHG | HEART RATE: 80 BPM | BODY MASS INDEX: 35.89 KG/M2 | TEMPERATURE: 97.9 F | RESPIRATION RATE: 16 BRPM | OXYGEN SATURATION: 98 % | WEIGHT: 265 LBS

## 2025-08-10 DIAGNOSIS — M25.521 RIGHT ELBOW PAIN: ICD-10-CM

## 2025-08-10 DIAGNOSIS — S46.002A ROTATOR CUFF INJURY, LEFT, INITIAL ENCOUNTER: Primary | ICD-10-CM

## 2025-08-10 PROCEDURE — 99283 EMERGENCY DEPT VISIT LOW MDM: CPT

## 2025-08-10 RX ORDER — TRAMADOL HYDROCHLORIDE 50 MG/1
50 TABLET ORAL EVERY 8 HOURS PRN
Qty: 9 TABLET | Refills: 0 | Status: SHIPPED | OUTPATIENT
Start: 2025-08-10 | End: 2025-08-13

## 2025-08-10 ASSESSMENT — PAIN DESCRIPTION - DESCRIPTORS: DESCRIPTORS: ACHING

## 2025-08-10 ASSESSMENT — PAIN DESCRIPTION - LOCATION: LOCATION: SHOULDER;ELBOW

## 2025-08-10 ASSESSMENT — PAIN SCALES - GENERAL: PAINLEVEL_OUTOF10: 10

## 2025-08-10 ASSESSMENT — PAIN - FUNCTIONAL ASSESSMENT: PAIN_FUNCTIONAL_ASSESSMENT: 0-10

## 2025-08-22 ENCOUNTER — OFFICE VISIT (OUTPATIENT)
Facility: CLINIC | Age: 65
End: 2025-08-22
Payer: COMMERCIAL

## 2025-08-22 VITALS
TEMPERATURE: 97.4 F | OXYGEN SATURATION: 97 % | HEIGHT: 72 IN | BODY MASS INDEX: 33.86 KG/M2 | HEART RATE: 74 BPM | WEIGHT: 250 LBS | DIASTOLIC BLOOD PRESSURE: 70 MMHG | SYSTOLIC BLOOD PRESSURE: 152 MMHG | RESPIRATION RATE: 18 BRPM

## 2025-08-22 DIAGNOSIS — Z12.5 SCREENING FOR PROSTATE CANCER: ICD-10-CM

## 2025-08-22 DIAGNOSIS — E55.9 VITAMIN D DEFICIENCY: ICD-10-CM

## 2025-08-22 DIAGNOSIS — M48.07 SPINAL STENOSIS, LUMBOSACRAL REGION: ICD-10-CM

## 2025-08-22 DIAGNOSIS — E78.2 MIXED HYPERLIPIDEMIA: ICD-10-CM

## 2025-08-22 DIAGNOSIS — N52.9 ERECTILE DYSFUNCTION, UNSPECIFIED ERECTILE DYSFUNCTION TYPE: ICD-10-CM

## 2025-08-22 DIAGNOSIS — I10 PRIMARY HYPERTENSION: Primary | ICD-10-CM

## 2025-08-22 DIAGNOSIS — F32.A ANXIETY AND DEPRESSION: ICD-10-CM

## 2025-08-22 DIAGNOSIS — M54.16 LUMBAR RADICULOPATHY: ICD-10-CM

## 2025-08-22 DIAGNOSIS — I10 PRIMARY HYPERTENSION: ICD-10-CM

## 2025-08-22 DIAGNOSIS — J44.9 CHRONIC OBSTRUCTIVE PULMONARY DISEASE, UNSPECIFIED COPD TYPE (HCC): ICD-10-CM

## 2025-08-22 DIAGNOSIS — F41.9 ANXIETY AND DEPRESSION: ICD-10-CM

## 2025-08-22 PROCEDURE — 3077F SYST BP >= 140 MM HG: CPT | Performed by: STUDENT IN AN ORGANIZED HEALTH CARE EDUCATION/TRAINING PROGRAM

## 2025-08-22 PROCEDURE — 99214 OFFICE O/P EST MOD 30 MIN: CPT | Performed by: STUDENT IN AN ORGANIZED HEALTH CARE EDUCATION/TRAINING PROGRAM

## 2025-08-22 PROCEDURE — 3078F DIAST BP <80 MM HG: CPT | Performed by: STUDENT IN AN ORGANIZED HEALTH CARE EDUCATION/TRAINING PROGRAM

## 2025-08-22 RX ORDER — SILDENAFIL 50 MG/1
50 TABLET, FILM COATED ORAL DAILY PRN
Qty: 10 TABLET | Refills: 2 | Status: SHIPPED | OUTPATIENT
Start: 2025-08-22

## 2025-08-22 RX ORDER — ATORVASTATIN CALCIUM 40 MG/1
40 TABLET, FILM COATED ORAL DAILY
Qty: 90 TABLET | Refills: 0 | Status: SHIPPED | OUTPATIENT
Start: 2025-08-22

## 2025-08-22 RX ORDER — AMLODIPINE BESYLATE 10 MG/1
10 TABLET ORAL DAILY
Qty: 90 TABLET | Refills: 3 | Status: SHIPPED | OUTPATIENT
Start: 2025-08-22

## 2025-08-22 RX ORDER — ESCITALOPRAM OXALATE 20 MG/1
20 TABLET ORAL DAILY
Qty: 90 TABLET | Refills: 2 | Status: SHIPPED | OUTPATIENT
Start: 2025-08-22

## 2025-08-22 RX ORDER — CYCLOBENZAPRINE HCL 5 MG
5 TABLET ORAL 2 TIMES DAILY PRN
Qty: 60 TABLET | Refills: 0 | Status: SHIPPED | OUTPATIENT
Start: 2025-08-22 | End: 2025-09-21

## 2025-08-22 RX ORDER — FLUTICASONE FUROATE, UMECLIDINIUM BROMIDE AND VILANTEROL TRIFENATATE 100; 62.5; 25 UG/1; UG/1; UG/1
1 POWDER RESPIRATORY (INHALATION) DAILY
Qty: 3 EACH | Refills: 2 | Status: SHIPPED | OUTPATIENT
Start: 2025-08-22

## 2025-08-22 RX ORDER — MELOXICAM 15 MG/1
15 TABLET ORAL DAILY
Qty: 30 TABLET | Refills: 0 | Status: SHIPPED | OUTPATIENT
Start: 2025-08-22

## 2025-08-22 ASSESSMENT — ENCOUNTER SYMPTOMS
BACK PAIN: 1
SHORTNESS OF BREATH: 0
ABDOMINAL PAIN: 0
COUGH: 0

## (undated) DEVICE — CORD BPLR 12FT SGL USE CLR

## (undated) DEVICE — GLOVE ORANGE PI 7 1/2   MSG9075

## (undated) DEVICE — CHS NEURO PLUS 1: Brand: MEDLINE INDUSTRIES, INC.

## (undated) DEVICE — DRAPE EQUIP C ARM 74X42 IN MOB XR W/ TIE RUBBER BND LF

## (undated) DEVICE — GOWN,PREVENTION PLUS,XLN/XL,ST,24/CS: Brand: MEDLINE

## (undated) DEVICE — INSERT CUSH HDRST PRONE AD LG --

## (undated) DEVICE — DRAPE,REIN 53X77,STERILE: Brand: MEDLINE

## (undated) DEVICE — CONTAINER,SPECIMEN,3OZ,OR STRL: Brand: MEDLINE

## (undated) DEVICE — Device

## (undated) DEVICE — MAGNETIC INSTR DRAPE 20X16: Brand: MEDLINE INDUSTRIES, INC.

## (undated) DEVICE — BLADE ELECTRODE: Brand: EDGE

## (undated) DEVICE — COVER LT HNDL BLU PLAS

## (undated) DEVICE — DRAPE SURG TOWEL SM 18X12 IN INVISISHIELD MP W/ ADH PLAS NS

## (undated) DEVICE — GLOVE SURG SZ 8 L12IN FNGR THK79MIL GRN LTX FREE

## (undated) DEVICE — LAMINECTOMY ARM CRADLE FOAM POSITIONER: Brand: CARDINAL HEALTH

## (undated) DEVICE — INSTRUMENT 955-519 MED GUIDEWIRE

## (undated) DEVICE — TIBURON UNIVERSAL SPINE DRAPE: Brand: CONVERTORS

## (undated) DEVICE — GLOVE SURG SZ 75 L12IN FNGR THK79MIL GRN LTX FREE

## (undated) DEVICE — DRAPE MICSCP W52XL154IN XLN EXTRA W 2 BRDG STEREO OBS DISP

## (undated) DEVICE — INTENDED FOR TISSUE SEPARATION, AND OTHER PROCEDURES THAT REQUIRE A SHARP SURGICAL BLADE TO PUNCTURE OR CUT.: Brand: BARD-PARKER ® CARBON RIB-BACK BLADES

## (undated) DEVICE — SOLUTION SURG PREP 26 CC PURPREP

## (undated) DEVICE — BASIC SINGLE BASIN-LF: Brand: MEDLINE INDUSTRIES, INC.

## (undated) DEVICE — SOUTHSIDE TURNOVER: Brand: MEDLINE INDUSTRIES, INC.

## (undated) DEVICE — SOLUTION IRRIG 1000ML 0.9% SOD CHL USP POUR PLAS BTL

## (undated) DEVICE — REM POLYHESIVE ADULT PATIENT RETURN ELECTRODE: Brand: VALLEYLAB

## (undated) DEVICE — 3M™ IOBAN™ 2 ANTIMICROBIAL INCISE DRAPE 6650EZ: Brand: IOBAN™ 2

## (undated) DEVICE — SUTURE ABSORBABLE BRAIDED 2-0 CP2 27 IN UD VICRYL + VCP869H

## (undated) DEVICE — COVER,TABLE,HEAVY DUTY,79"X110",STRL: Brand: MEDLINE

## (undated) DEVICE — GARMENT,MEDLINE,DVT,INT,CALF,MED, GEN2: Brand: MEDLINE

## (undated) DEVICE — SYSTEM SKIN CLSR 22CM DERMBND PRINEO